# Patient Record
Sex: FEMALE | Race: WHITE | Employment: OTHER | URBAN - NONMETROPOLITAN AREA
[De-identification: names, ages, dates, MRNs, and addresses within clinical notes are randomized per-mention and may not be internally consistent; named-entity substitution may affect disease eponyms.]

---

## 2017-01-09 ENCOUNTER — OFFICE VISIT (OUTPATIENT)
Dept: ENDOCRINOLOGY | Age: 62
End: 2017-01-09

## 2017-01-09 VITALS
SYSTOLIC BLOOD PRESSURE: 137 MMHG | HEART RATE: 85 BPM | HEIGHT: 69 IN | DIASTOLIC BLOOD PRESSURE: 67 MMHG | WEIGHT: 240 LBS | BODY MASS INDEX: 35.55 KG/M2 | RESPIRATION RATE: 18 BRPM

## 2017-01-09 DIAGNOSIS — E89.0 POST-SURGICAL HYPOTHYROIDISM: ICD-10-CM

## 2017-01-09 DIAGNOSIS — E10.65 TYPE 1 DIABETES MELLITUS WITH HYPERGLYCEMIA (HCC): Primary | ICD-10-CM

## 2017-01-09 PROCEDURE — 99214 OFFICE O/P EST MOD 30 MIN: CPT | Performed by: CLINICAL NURSE SPECIALIST

## 2017-01-09 RX ORDER — CALCIUM CARBONATE/VITAMIN D3 600 MG-10
1200 TABLET ORAL DAILY
COMMUNITY

## 2017-01-09 ASSESSMENT — ENCOUNTER SYMPTOMS
WHEEZING: 0
SHORTNESS OF BREATH: 0
NAUSEA: 0
CHEST TIGHTNESS: 0
VOICE CHANGE: 0
ABDOMINAL PAIN: 0
CONSTIPATION: 0
COUGH: 0
DIARRHEA: 0
EYE REDNESS: 0

## 2017-02-16 RX ORDER — PERINDOPRIL ERBUMINE 2 MG/1
TABLET ORAL
Qty: 180 TABLET | Refills: 0 | Status: SHIPPED | OUTPATIENT
Start: 2017-02-16 | End: 2017-05-24 | Stop reason: SDUPTHER

## 2017-03-20 ENCOUNTER — OFFICE VISIT (OUTPATIENT)
Dept: ENDOCRINOLOGY | Age: 62
End: 2017-03-20

## 2017-03-20 VITALS
HEIGHT: 69 IN | HEART RATE: 83 BPM | RESPIRATION RATE: 16 BRPM | DIASTOLIC BLOOD PRESSURE: 62 MMHG | WEIGHT: 237 LBS | SYSTOLIC BLOOD PRESSURE: 122 MMHG | BODY MASS INDEX: 35.1 KG/M2

## 2017-03-20 DIAGNOSIS — E78.2 MIXED HYPERLIPIDEMIA: ICD-10-CM

## 2017-03-20 DIAGNOSIS — E10.65 TYPE 1 DIABETES MELLITUS WITH HYPERGLYCEMIA (HCC): Primary | ICD-10-CM

## 2017-03-20 DIAGNOSIS — E03.9 ACQUIRED HYPOTHYROIDISM: ICD-10-CM

## 2017-03-20 PROCEDURE — 99214 OFFICE O/P EST MOD 30 MIN: CPT | Performed by: INTERNAL MEDICINE

## 2017-03-20 RX ORDER — EZETIMIBE 10 MG/1
10 TABLET ORAL DAILY
Qty: 30 TABLET | Refills: 3 | Status: SHIPPED | OUTPATIENT
Start: 2017-03-20 | End: 2017-10-17

## 2017-03-20 ASSESSMENT — ENCOUNTER SYMPTOMS: BLURRED VISION: 0

## 2017-03-29 ENCOUNTER — TELEPHONE (OUTPATIENT)
Dept: OTHER | Age: 62
End: 2017-03-29

## 2017-05-03 ENCOUNTER — TELEPHONE (OUTPATIENT)
Dept: ENDOCRINOLOGY | Age: 62
End: 2017-05-03

## 2017-05-03 DIAGNOSIS — E03.9 ACQUIRED HYPOTHYROIDISM: ICD-10-CM

## 2017-05-03 RX ORDER — LEVOTHYROXINE SODIUM 137 MCG
TABLET ORAL
Qty: 90 TABLET | Refills: 0 | Status: SHIPPED | OUTPATIENT
Start: 2017-05-03 | End: 2017-08-01 | Stop reason: SDUPTHER

## 2017-05-25 ENCOUNTER — TELEPHONE (OUTPATIENT)
Dept: ENDOCRINOLOGY | Age: 62
End: 2017-05-25

## 2017-05-25 DIAGNOSIS — E10.65 TYPE 1 DIABETES MELLITUS WITH HYPERGLYCEMIA (HCC): ICD-10-CM

## 2017-05-25 DIAGNOSIS — E03.9 ACQUIRED HYPOTHYROIDISM: Primary | ICD-10-CM

## 2017-05-25 RX ORDER — PERINDOPRIL ERBUMINE 2 MG/1
TABLET ORAL
Qty: 180 TABLET | Refills: 0 | Status: SHIPPED | OUTPATIENT
Start: 2017-05-25 | End: 2017-06-05 | Stop reason: SDUPTHER

## 2017-06-05 RX ORDER — PERINDOPRIL ERBUMINE 2 MG/1
TABLET ORAL
Qty: 180 TABLET | Refills: 0 | Status: SHIPPED | OUTPATIENT
Start: 2017-06-05 | End: 2017-09-03 | Stop reason: SDUPTHER

## 2017-07-19 ENCOUNTER — OFFICE VISIT (OUTPATIENT)
Dept: ENDOCRINOLOGY | Age: 62
End: 2017-07-19
Payer: COMMERCIAL

## 2017-07-19 VITALS
HEART RATE: 72 BPM | RESPIRATION RATE: 16 BRPM | BODY MASS INDEX: 34.36 KG/M2 | SYSTOLIC BLOOD PRESSURE: 128 MMHG | WEIGHT: 232 LBS | DIASTOLIC BLOOD PRESSURE: 68 MMHG | HEIGHT: 69 IN

## 2017-07-19 DIAGNOSIS — E03.9 HYPOTHYROIDISM (ACQUIRED): ICD-10-CM

## 2017-07-19 DIAGNOSIS — Z91.199 NONCOMPLIANCE WITH THERAPEUTIC PLAN: ICD-10-CM

## 2017-07-19 DIAGNOSIS — E11.65 TYPE 2 DIABETES MELLITUS WITH HYPERGLYCEMIA, WITH LONG-TERM CURRENT USE OF INSULIN (HCC): Primary | ICD-10-CM

## 2017-07-19 DIAGNOSIS — Z79.4 TYPE 2 DIABETES MELLITUS WITH HYPERGLYCEMIA, WITH LONG-TERM CURRENT USE OF INSULIN (HCC): Primary | ICD-10-CM

## 2017-07-19 DIAGNOSIS — E55.9 HYPOVITAMINOSIS D: ICD-10-CM

## 2017-07-19 PROCEDURE — 99214 OFFICE O/P EST MOD 30 MIN: CPT | Performed by: CLINICAL NURSE SPECIALIST

## 2017-07-19 ASSESSMENT — ENCOUNTER SYMPTOMS
NAUSEA: 0
EYE REDNESS: 0
VOICE CHANGE: 0
COUGH: 0
ABDOMINAL PAIN: 0
WHEEZING: 0
CHEST TIGHTNESS: 0
DIARRHEA: 0
SHORTNESS OF BREATH: 0
CONSTIPATION: 0

## 2017-08-01 DIAGNOSIS — E03.9 ACQUIRED HYPOTHYROIDISM: ICD-10-CM

## 2017-08-01 RX ORDER — LEVOTHYROXINE SODIUM 137 MCG
TABLET ORAL
Qty: 90 TABLET | Refills: 1 | Status: SHIPPED | OUTPATIENT
Start: 2017-08-01 | End: 2017-12-29 | Stop reason: SDUPTHER

## 2017-08-08 ENCOUNTER — TELEPHONE (OUTPATIENT)
Dept: ENDOCRINOLOGY | Age: 62
End: 2017-08-08

## 2017-08-29 ENCOUNTER — HOSPITAL ENCOUNTER (OUTPATIENT)
Age: 62
Discharge: HOME | End: 2017-08-29
Payer: COMMERCIAL

## 2017-08-29 DIAGNOSIS — E55.9 HYPOVITAMINOSIS D: ICD-10-CM

## 2017-08-29 DIAGNOSIS — E03.9 HYPOTHYROIDISM (ACQUIRED): ICD-10-CM

## 2017-08-29 LAB
T4 FREE: 1.41 NG/DL (ref 0.93–1.76)
TSH SERPL DL<=0.05 MIU/L-ACNC: 1.09 UIU/ML (ref 0.4–4.2)
VITAMIN D 25-HYDROXY: 33 NG/ML (ref 30–100)

## 2017-08-29 PROCEDURE — 84443 ASSAY THYROID STIM HORMONE: CPT

## 2017-08-29 PROCEDURE — 36415 COLL VENOUS BLD VENIPUNCTURE: CPT

## 2017-08-29 PROCEDURE — 84439 ASSAY OF FREE THYROXINE: CPT

## 2017-08-29 PROCEDURE — 82306 VITAMIN D 25 HYDROXY: CPT

## 2017-09-05 ENCOUNTER — TELEPHONE (OUTPATIENT)
Dept: ENDOCRINOLOGY | Age: 62
End: 2017-09-05

## 2017-09-05 DIAGNOSIS — Z79.4 TYPE 2 DIABETES MELLITUS WITH HYPERGLYCEMIA, WITH LONG-TERM CURRENT USE OF INSULIN (HCC): ICD-10-CM

## 2017-09-05 DIAGNOSIS — E05.90 HYPERTHYROIDISM: Primary | ICD-10-CM

## 2017-09-05 DIAGNOSIS — E11.65 TYPE 2 DIABETES MELLITUS WITH HYPERGLYCEMIA, WITH LONG-TERM CURRENT USE OF INSULIN (HCC): ICD-10-CM

## 2017-09-06 RX ORDER — PERINDOPRIL ERBUMINE 2 MG/1
TABLET ORAL
Qty: 180 TABLET | Refills: 1 | Status: SHIPPED | OUTPATIENT
Start: 2017-09-06 | End: 2018-03-05 | Stop reason: SDUPTHER

## 2017-10-06 DIAGNOSIS — E10.65 TYPE 1 DIABETES MELLITUS WITH HYPERGLYCEMIA (HCC): ICD-10-CM

## 2017-10-09 NOTE — TELEPHONE ENCOUNTER
Patients mail order company contacted our office requesting a 90 day supply of Lantus. She was seen in the office last July 2017 and is scheduled to return 10-17-17. RX is pending, please advise.

## 2017-10-13 ENCOUNTER — HOSPITAL ENCOUNTER (OUTPATIENT)
Age: 62
Discharge: HOME | End: 2017-10-13
Payer: COMMERCIAL

## 2017-10-13 DIAGNOSIS — Z79.4 TYPE 2 DIABETES MELLITUS WITH HYPERGLYCEMIA, WITH LONG-TERM CURRENT USE OF INSULIN (HCC): ICD-10-CM

## 2017-10-13 DIAGNOSIS — E11.65 TYPE 2 DIABETES MELLITUS WITH HYPERGLYCEMIA, WITH LONG-TERM CURRENT USE OF INSULIN (HCC): ICD-10-CM

## 2017-10-13 DIAGNOSIS — E05.90 HYPERTHYROIDISM: ICD-10-CM

## 2017-10-13 LAB
AVERAGE GLUCOSE: 210 MG/DL (ref 70–126)
HBA1C MFR BLD: 9 % (ref 4.4–6.4)
T4 FREE: 1.35 NG/DL (ref 0.93–1.76)
TSH SERPL DL<=0.05 MIU/L-ACNC: 1.66 UIU/ML (ref 0.4–4.2)

## 2017-10-13 PROCEDURE — 84439 ASSAY OF FREE THYROXINE: CPT

## 2017-10-13 PROCEDURE — 83036 HEMOGLOBIN GLYCOSYLATED A1C: CPT

## 2017-10-13 PROCEDURE — 36415 COLL VENOUS BLD VENIPUNCTURE: CPT

## 2017-10-13 PROCEDURE — 84443 ASSAY THYROID STIM HORMONE: CPT

## 2017-10-17 ENCOUNTER — OFFICE VISIT (OUTPATIENT)
Dept: ENDOCRINOLOGY | Age: 62
End: 2017-10-17
Payer: COMMERCIAL

## 2017-10-17 VITALS
HEIGHT: 69 IN | DIASTOLIC BLOOD PRESSURE: 64 MMHG | HEART RATE: 64 BPM | WEIGHT: 236 LBS | RESPIRATION RATE: 16 BRPM | SYSTOLIC BLOOD PRESSURE: 131 MMHG | BODY MASS INDEX: 34.96 KG/M2

## 2017-10-17 DIAGNOSIS — E10.65 TYPE 1 DIABETES MELLITUS WITH HYPERGLYCEMIA (HCC): ICD-10-CM

## 2017-10-17 DIAGNOSIS — E03.9 HYPOTHYROIDISM (ACQUIRED): Primary | ICD-10-CM

## 2017-10-17 DIAGNOSIS — E78.2 MIXED HYPERLIPIDEMIA: ICD-10-CM

## 2017-10-17 PROCEDURE — 99214 OFFICE O/P EST MOD 30 MIN: CPT | Performed by: CLINICAL NURSE SPECIALIST

## 2017-10-17 ASSESSMENT — ENCOUNTER SYMPTOMS
DIARRHEA: 0
VOICE CHANGE: 0
ABDOMINAL PAIN: 0
WHEEZING: 0
COUGH: 0
SHORTNESS OF BREATH: 0
NAUSEA: 0
EYE REDNESS: 0
CHEST TIGHTNESS: 0
CONSTIPATION: 0

## 2017-10-17 NOTE — PROGRESS NOTES
[facility] PROFESSIONAL SERVS  ENDOCRINE DIABETES METABOLISM CHRISTIANNE  [address]  [address] [place] [address]  Dept: [phone]  Dept Fax: [phone]  Loc: [phone]    Theresa Figueredo is a 63 y.o. female who presents today for follow up evaluation for type 1 diabetes diagnosed . Last visit 2017. Patient is maintained on lantus BID, humalog fixed dose TID. She continues to check BS minimally and is not consistent with insulin administration and diet. Checking 1-3 times daily readings 60's to 300's throughout the day. She does recognize symptoms of lows. A1c 9% this month improved from previous 10% in 2017. She reports inconsistency in meal times, diet, activities. She did not re attend education as requested previously. relates insurance will not cover education -   Denies foot problems, no numbness, tingling, rash or lesion. Eye exam within past year history of glaucoma, mild retinopathy bilaterally, cataracts - following with ophthalmology every 6 months. Patient is allso hypothyroid secondary to thyroidectomy. Status post total thyroidectomy 13 by Dr. 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 Pathology benign.  Maintained on synthroid 137 mcg daily  Thyroid function in goal this month TSH 1.66, FT4 1.35.      Chief Complaint   Patient presents with    Diabetes     Type 2,  at 12pm FASTING (her meter)    Hypothyroidism    Other     81mg aspirin    Other     Last eye exam 2017     Other     No feet issues       HPI:     HPI    Past Medical History:   Diagnosis Date    Depression     Fibromyalgia     Glaucoma     Hyperthyroidism     Meningitis 10-    viral     Meningitis spinal 10/04/2013    Viral    Osteoarthritis     Type I (juvenile type) diabetes mellitus without mention of complication, not stated as uncontrolled       Past Surgical History:   Procedure Laterality Date     SECTION      COLONOSCOPY      EYE SURGERY Left 6-18-14    HEMORRHOID SURGERY      INSERTABLE CARDIAC MONITOR Left 07/13/2017    Dr. Ferris Sicard Left 09/04/2013    Meniscus repair    TONSILLECTOMY      TOTAL THYROIDECTOMY  12/27/2013     Family History   Problem Relation Age of Onset    Thyroid Disease Mother     High Cholesterol Mother     Diabetes Father      Social History   Substance Use Topics    Smoking status: Never Smoker    Smokeless tobacco: Never Used    Alcohol use No      Comment: rarely      Current Outpatient Prescriptions   Medication Sig Dispense Refill    insulin glargine (LANTUS SOLOSTAR) 100 UNIT/ML injection pen Inject 15 Units into the skin 2 times daily 10 Pen 1    perindopril (ACEON) 2 MG tablet TAKE 2 TABLETS DAILY 180 tablet 1    insulin lispro (HUMALOG KWIKPEN) 100 UNIT/ML pen Inject 8 units TID before meals plus sliding scale. (Max dose 48U/day) 15 Pen 1    SYNTHROID 137 MCG tablet Take one tablet daily with an extra half tablet one day per week 90 tablet 1    Omega 3 1200 MG CAPS Take 1,200 capsules by mouth daily      Blood Glucose Monitoring Suppl (ADVOCATE BLOOD GLUCOSE MONITOR) JUAN Check blood sugar 4 x daily (Dx:E10.65) 1 Device 0    glucose blood VI test strips (ADVOCATE TEST) strip Check blood sugar 4 x daily (Dx: E10.65) 150 each 5    Advocate Lancets MISC Check blood sugar 4 x daily (Dx: E10.65) 150 each 5    brimonidine-timolol (COMBIGAN) 0.2-0.5 % ophthalmic solution Place 1 drop into both eyes 3 times daily       metoprolol (LOPRESSOR) 25 MG tablet Take 25 mg by mouth 2 times daily      calcium carbonate (TUMS CALCIUM FOR LIFE BONE) 750 MG chewable tablet Take 1 tablet by mouth daily.  aspirin 81 MG tablet Take 81 mg by mouth daily.  PARoxetine (PAXIL) 10 MG tablet Take 20 mg by mouth every morning       therapeutic multivitamin-minerals (THERAGRAN-M) tablet Take 1 tablet by mouth daily.  Ascorbic Acid (VITAMIN C) 500 MG tablet Take 1,000 mg by mouth daily.  vitamin E 1000 UNITS capsule Take 1,000 Units by mouth daily.  Chromium Picolinate 1000 MCG TABS Take 2,000 mcg by mouth daily. No current facility-administered medications for this visit. Allergies   Allergen Reactions    Milk-Related Compounds Other (See Comments)     Severe constipation    Pcn [Penicillins] Rash       Subjective:      Review of Systems   Constitutional: Negative for appetite change, chills, fatigue and unexpected weight change. HENT: Positive for congestion. Negative for hearing loss, tinnitus and voice change. Eyes: Negative for redness and visual disturbance. Respiratory: Negative for cough, chest tightness, shortness of breath and wheezing. Cardiovascular: Negative for chest pain, palpitations and leg swelling. Gastrointestinal: Negative for abdominal pain, constipation, diarrhea and nausea. Endocrine: Negative. Genitourinary: Negative for difficulty urinating, dysuria, frequency and hematuria. Musculoskeletal: Negative for gait problem, myalgias and neck pain. Skin: Negative for rash. Neurological: Negative for dizziness, tremors, facial asymmetry, weakness, numbness and headaches. Hematological: Negative for adenopathy. Psychiatric/Behavioral: Negative for agitation, confusion, sleep disturbance and suicidal ideas. The patient is not nervous/anxious and is not hyperactive. Objective:     Physical Exam   Constitutional: She is oriented to person, place, and time. She appears well-developed and well-nourished. No distress. HENT:   Head: Normocephalic and atraumatic. Right Ear: External ear normal.   Left Ear: External ear normal.   Nose: Nose normal.   Eyes: Conjunctivae and EOM are normal. Pupils are equal, round, and reactive to light. Right eye exhibits no discharge. Left eye exhibits no discharge. No scleral icterus. Neck: Normal range of motion. Neck supple. No thyromegaly present.    Cardiovascular: Normal rate, regular rhythm, intolerance to statin and stopped - 3/2017 chol 149, trig 183, HDL 45, LDL 67, liver panel unremarkable - reports she stopped statin as discussed with previous cardiologist - currently with Dr. Teresita Pappas - she is taking OTC fish oil     Greater than 50% of visit discussing glycemic management, diagnoses as above, reviewing/ordering labs, changing/addressing medication, answering questions      Plan:      Check blood sugar 4 times daily before meals and bedtime  Blood sugar goal 100 to 140  Call in blood sugar readings and dose of insulin taken in 2 weeks; sooner for low/concerns      Lantus 15 units in AM and 15 units in PM  Humalog 8 units three times daily with meals plus sliding scale  Insulin Correction Sliding Scale      Glucose Level:    mg/dl = No extra Insulin   151-200 mg/dl = 1 Units   201-250 mg/dl = 2 Units   251-300 mg/dl = 3 units  301-350 mg/dl = 4 units   351-400 mg/dl = 5 units   Above 400 mg/dl = 6 units and call MD      5 MINUTES BEFORE BREAKFAST, LUNCH, DINNER  Meal time insulin starts to work in about 5 minutes and lasts about 4 to 5 hours in your system. If lows occur during that time, too much insulin was taken for amount of food eaten and/or you were very active      synthroid 137 mcg daily and take extra 1/2 tablet one day per week. Take in AM with water at least one half hour before food or other medication. Continue to exercise, be active as much as possible daily  Continue with portion control, healthy choices, lots of vegetables and whole grains, lean meat, fish, chicken, turkey    Return in about 3 months (around 1/17/2018).        Electronically signed by GONSALO Ellsworth on 10/17/2017 at 4:22 PM

## 2017-12-29 DIAGNOSIS — E03.9 ACQUIRED HYPOTHYROIDISM: ICD-10-CM

## 2018-01-02 RX ORDER — LEVOTHYROXINE SODIUM 137 MCG
TABLET ORAL
Qty: 90 TABLET | Refills: 1 | Status: SHIPPED | OUTPATIENT
Start: 2018-01-02 | End: 2018-06-21 | Stop reason: SDUPTHER

## 2018-02-15 RX ORDER — INSULIN LISPRO 100 [IU]/ML
INJECTION, SOLUTION INTRAVENOUS; SUBCUTANEOUS
Qty: 15 PEN | Refills: 1 | Status: SHIPPED | OUTPATIENT
Start: 2018-02-15 | End: 2018-09-20 | Stop reason: SDUPTHER

## 2018-02-22 ENCOUNTER — HOSPITAL ENCOUNTER (OUTPATIENT)
Age: 63
Discharge: HOME | End: 2018-02-22
Payer: COMMERCIAL

## 2018-02-22 DIAGNOSIS — E78.2 MIXED HYPERLIPIDEMIA: ICD-10-CM

## 2018-02-22 DIAGNOSIS — E03.9 HYPOTHYROIDISM (ACQUIRED): ICD-10-CM

## 2018-02-22 DIAGNOSIS — E10.65 TYPE 1 DIABETES MELLITUS WITH HYPERGLYCEMIA (HCC): ICD-10-CM

## 2018-02-22 LAB
ALBUMIN SERPL-MCNC: 4.4 G/DL (ref 3.5–5.1)
ALP BLD-CCNC: 102 U/L (ref 38–126)
ALT SERPL-CCNC: 24 U/L (ref 11–66)
ANION GAP SERPL CALCULATED.3IONS-SCNC: 13 MEQ/L (ref 8–16)
AST SERPL-CCNC: 24 U/L (ref 5–40)
AVERAGE GLUCOSE: 219 MG/DL (ref 70–126)
BILIRUB SERPL-MCNC: 0.4 MG/DL (ref 0.3–1.2)
BUN BLDV-MCNC: 15 MG/DL (ref 7–22)
CALCIUM SERPL-MCNC: 10.1 MG/DL (ref 8.5–10.5)
CHLORIDE BLD-SCNC: 97 MEQ/L (ref 98–111)
CHOLESTEROL, TOTAL: 254 MG/DL (ref 100–199)
CO2: 29 MEQ/L (ref 23–33)
CREAT SERPL-MCNC: 0.9 MG/DL (ref 0.4–1.2)
GFR SERPL CREATININE-BSD FRML MDRD: 64 ML/MIN/1.73M2
GLUCOSE BLD-MCNC: 240 MG/DL (ref 70–108)
HBA1C MFR BLD: 9.3 % (ref 4.4–6.4)
HDLC SERPL-MCNC: 71 MG/DL
LDL CHOLESTEROL CALCULATED: 148 MG/DL
POTASSIUM SERPL-SCNC: 5 MEQ/L (ref 3.5–5.2)
SODIUM BLD-SCNC: 139 MEQ/L (ref 135–145)
T4 FREE: 1.5 NG/DL (ref 0.93–1.76)
TOTAL PROTEIN: 7.7 G/DL (ref 6.1–8)
TRIGL SERPL-MCNC: 173 MG/DL (ref 0–199)
TSH SERPL DL<=0.05 MIU/L-ACNC: 2.05 UIU/ML (ref 0.4–4.2)

## 2018-02-22 PROCEDURE — 80053 COMPREHEN METABOLIC PANEL: CPT

## 2018-02-22 PROCEDURE — 84443 ASSAY THYROID STIM HORMONE: CPT

## 2018-02-22 PROCEDURE — 36415 COLL VENOUS BLD VENIPUNCTURE: CPT

## 2018-02-22 PROCEDURE — 80061 LIPID PANEL: CPT

## 2018-02-22 PROCEDURE — 83036 HEMOGLOBIN GLYCOSYLATED A1C: CPT

## 2018-02-22 PROCEDURE — 84439 ASSAY OF FREE THYROXINE: CPT

## 2018-02-23 ENCOUNTER — TELEPHONE (OUTPATIENT)
Dept: ENDOCRINOLOGY | Age: 63
End: 2018-02-23

## 2018-02-23 NOTE — TELEPHONE ENCOUNTER
----- Message from GONSALO Hoyos sent at 2/22/2018 10:23 AM EST -----  Elevated A1c at 9.3%, high cholesterol; keep upcoming appointment

## 2018-03-01 ENCOUNTER — OFFICE VISIT (OUTPATIENT)
Dept: ENDOCRINOLOGY | Age: 63
End: 2018-03-01
Payer: COMMERCIAL

## 2018-03-01 VITALS
HEART RATE: 81 BPM | DIASTOLIC BLOOD PRESSURE: 72 MMHG | SYSTOLIC BLOOD PRESSURE: 124 MMHG | BODY MASS INDEX: 34.88 KG/M2 | HEIGHT: 69 IN | RESPIRATION RATE: 16 BRPM | WEIGHT: 235.5 LBS

## 2018-03-01 DIAGNOSIS — E03.9 ACQUIRED HYPOTHYROIDISM: Primary | ICD-10-CM

## 2018-03-01 DIAGNOSIS — E10.9 TYPE 1 DIABETES MELLITUS WITHOUT COMPLICATION (HCC): ICD-10-CM

## 2018-03-01 DIAGNOSIS — I10 ESSENTIAL HYPERTENSION: ICD-10-CM

## 2018-03-01 DIAGNOSIS — E78.2 MIXED HYPERLIPIDEMIA: ICD-10-CM

## 2018-03-01 PROCEDURE — 99214 OFFICE O/P EST MOD 30 MIN: CPT | Performed by: INTERNAL MEDICINE

## 2018-03-01 RX ORDER — MIDODRINE HYDROCHLORIDE 5 MG/1
5 TABLET ORAL 3 TIMES DAILY
COMMUNITY
End: 2020-07-13 | Stop reason: ALTCHOICE

## 2018-03-01 ASSESSMENT — ENCOUNTER SYMPTOMS
CONSTIPATION: 0
NAUSEA: 0
SHORTNESS OF BREATH: 0
ABDOMINAL DISTENTION: 0
DIARRHEA: 0
CHEST TIGHTNESS: 0
WHEEZING: 0

## 2018-03-01 NOTE — PROGRESS NOTES
Value Date     02/22/2018    K 5.0 02/22/2018    CL 97 02/22/2018    CO2 29 02/22/2018    BUN 15 02/22/2018    CREATININE 0.9 02/22/2018    GLUCOSE 240 02/22/2018    GLUCOSE 230 10/26/2015    CALCIUM 10.1 02/22/2018          2)    Patient is allso hypothyroid secondary to thyroidectomy. Status post total thyroidectomy 12/27/13 by Dr. Mansoor Guadarrama for toxic nodular goiter previously treated with tapazole.  Pathology benign.  Maintained on synthroid 137 mcg daily    Lab Results   Component Value Date    TSH 2.050 02/22/2018         Medications:    Current Outpatient Prescriptions:     midodrine (PROAMATINE) 5 MG tablet, Take 5 mg by mouth 2 times daily, Disp: , Rfl:     glucagon 1 MG injection, Inject 1 mg into the skin See Admin Instructions Follow package directions for low blood sugar., Disp: 1 kit, Rfl: 3    acetone, urine, test strip, Please test urine for ketones if you have nausea, vomiting and abdominal pain., Disp: 10 strip, Rfl: 1    HUMALOG KWIKPEN 100 UNIT/ML pen, INJECT 8 UNITS THREE TIMES A DAY BEFORE MEALS PLUS SLIDING SCALE. (MAX DOSE 48 UNITS/DAY), Disp: 15 pen, Rfl: 1    SYNTHROID 137 MCG tablet, TAKE 1 TABLET DAILY WITH AN EXTRA HALF TABLET ONE DAY PER WEEK, Disp: 90 tablet, Rfl: 1    insulin glargine (LANTUS SOLOSTAR) 100 UNIT/ML injection pen, Inject 15 Units into the skin 2 times daily, Disp: 10 Pen, Rfl: 1    perindopril (ACEON) 2 MG tablet, TAKE 2 TABLETS DAILY, Disp: 180 tablet, Rfl: 1    Omega 3 1200 MG CAPS, Take 1,200 capsules by mouth daily, Disp: , Rfl:     Blood Glucose Monitoring Suppl (ADVOCATE BLOOD GLUCOSE MONITOR) JUAN, Check blood sugar 4 x daily (Dx:E10.65), Disp: 1 Device, Rfl: 0    glucose blood VI test strips (ADVOCATE TEST) strip, Check blood sugar 4 x daily (Dx: E10.65), Disp: 150 each, Rfl: 5    Advocate Lancets MISC, Check blood sugar 4 x daily (Dx: E10.65), Disp: 150 each, Rfl: 5    brimonidine-timolol (COMBIGAN) 0.2-0.5 % ophthalmic solution, Place 1 drop into both eyes 3 times daily , Disp: , Rfl:     metoprolol (LOPRESSOR) 25 MG tablet, Take 25 mg by mouth 2 times daily, Disp: , Rfl:     calcium carbonate (TUMS CALCIUM FOR LIFE BONE) 750 MG chewable tablet, Take 1 tablet by mouth daily. , Disp: , Rfl:     aspirin 81 MG tablet, Take 81 mg by mouth daily. , Disp: , Rfl:     PARoxetine (PAXIL) 10 MG tablet, Take 20 mg by mouth every morning , Disp: , Rfl:     therapeutic multivitamin-minerals (THERAGRAN-M) tablet, Take 1 tablet by mouth daily. , Disp: , Rfl:     Ascorbic Acid (VITAMIN C) 500 MG tablet, Take 1,000 mg by mouth daily. , Disp: , Rfl:     vitamin E 1000 UNITS capsule, Take 1,000 Units by mouth daily. , Disp: , Rfl:     Chromium Picolinate 1000 MCG TABS, Take 2,000 mcg by mouth daily. , Disp: , Rfl:     Allergies: The patient is allergic to milk-related compounds and pcn [penicillins]. Past Medical History:  Rashawn Hall  has a past medical history of Depression; Fibromyalgia; Glaucoma; Hyperthyroidism; Meningitis; Meningitis spinal; Osteoarthritis; and Type I (juvenile type) diabetes mellitus without mention of complication, not stated as uncontrolled. Past Surgical History:  The patient  has a past surgical history that includes  section; Tonsillectomy; Hemorrhoid surgery; knee surgery (Left, 2013); Colonoscopy; Total Thyroidectomy (2013); Eye surgery (Left, 14); and Insertable Cardiac Monitor (Left, 2017). Family History: This patient's family history includes Diabetes in her father; High Cholesterol in her mother; Thyroid Disease in her mother. Social History:  Rashawn Hall  reports that she has never smoked. She has never used smokeless tobacco. She reports that she does not drink alcohol or use drugs. Review of Systems:   Review of Systems   Constitutional: Negative for activity change, appetite change, fatigue and fever. Respiratory: Negative for chest tightness, shortness of breath and wheezing.     Cardiovascular:

## 2018-03-01 NOTE — PATIENT INSTRUCTIONS
Lantus 15 units in AM and 12 units in PM  Humalog 8 units with breakfast and lunch and 11 units with dinner  plus sliding scale  Insulin Correction Sliding Scale      Glucose Level:    mg/dl = No extra Insulin   151-200 mg/dl = 1 Units   201-250 mg/dl = 2 Units   251-300 mg/dl = 3 units  301-350 mg/dl = 4 units   351-400 mg/dl = 5 units   Above 400 mg/dl = 6 units and call MD

## 2018-03-05 RX ORDER — PERINDOPRIL ERBUMINE 2 MG/1
TABLET ORAL
Qty: 180 TABLET | Refills: 1 | Status: SHIPPED | OUTPATIENT
Start: 2018-03-05 | End: 2018-09-06 | Stop reason: SDUPTHER

## 2018-03-07 ENCOUNTER — TELEPHONE (OUTPATIENT)
Dept: ENDOCRINOLOGY | Age: 63
End: 2018-03-07

## 2018-03-08 ENCOUNTER — TELEPHONE (OUTPATIENT)
Dept: ENDOCRINOLOGY | Age: 63
End: 2018-03-08

## 2018-03-15 ENCOUNTER — TELEPHONE (OUTPATIENT)
Dept: ENDOCRINOLOGY | Age: 63
End: 2018-03-15

## 2018-05-01 DIAGNOSIS — E10.65 TYPE 1 DIABETES MELLITUS WITH HYPERGLYCEMIA (HCC): ICD-10-CM

## 2018-05-02 RX ORDER — INSULIN GLARGINE 100 [IU]/ML
INJECTION, SOLUTION SUBCUTANEOUS
Qty: 30 ML | Refills: 1 | Status: SHIPPED | OUTPATIENT
Start: 2018-05-02 | End: 2019-01-21 | Stop reason: SDUPTHER

## 2018-06-14 ENCOUNTER — HOSPITAL ENCOUNTER (OUTPATIENT)
Age: 63
Discharge: HOME | End: 2018-06-14
Payer: COMMERCIAL

## 2018-06-14 LAB
ALT SERPL-CCNC: 26 U/L (ref 11–66)
ANION GAP SERPL CALCULATED.3IONS-SCNC: 14 MEQ/L (ref 8–16)
AST SERPL-CCNC: 26 U/L (ref 5–40)
AVERAGE GLUCOSE: 249 MG/DL (ref 70–126)
BUN BLDV-MCNC: 15 MG/DL (ref 7–22)
CALCIUM SERPL-MCNC: 9.7 MG/DL (ref 8.5–10.5)
CHLORIDE BLD-SCNC: 98 MEQ/L (ref 98–111)
CHOLESTEROL, TOTAL: 267 MG/DL (ref 100–199)
CO2: 26 MEQ/L (ref 23–33)
CREAT SERPL-MCNC: 0.8 MG/DL (ref 0.4–1.2)
CREATININE, URINE: 92.2 MG/DL
GFR SERPL CREATININE-BSD FRML MDRD: 73 ML/MIN/1.73M2
GLUCOSE BLD-MCNC: 287 MG/DL (ref 70–108)
HBA1C MFR BLD: 10.3 % (ref 4.4–6.4)
HDLC SERPL-MCNC: 63 MG/DL
LDL CHOLESTEROL CALCULATED: 155 MG/DL
MICROALBUMIN UR-MCNC: < 1.2 MG/DL
MICROALBUMIN/CREAT UR-RTO: 13 MG/G (ref 0–30)
POTASSIUM SERPL-SCNC: 5.2 MEQ/L (ref 3.5–5.2)
SODIUM BLD-SCNC: 138 MEQ/L (ref 135–145)
T4 FREE: 1.32 NG/DL (ref 0.93–1.76)
TRIGL SERPL-MCNC: 244 MG/DL (ref 0–199)
TSH SERPL DL<=0.05 MIU/L-ACNC: 1.65 UIU/ML (ref 0.4–4.2)

## 2018-06-14 PROCEDURE — 84460 ALANINE AMINO (ALT) (SGPT): CPT

## 2018-06-14 PROCEDURE — 80061 LIPID PANEL: CPT

## 2018-06-14 PROCEDURE — 83036 HEMOGLOBIN GLYCOSYLATED A1C: CPT

## 2018-06-14 PROCEDURE — 36415 COLL VENOUS BLD VENIPUNCTURE: CPT

## 2018-06-14 PROCEDURE — 84450 TRANSFERASE (AST) (SGOT): CPT

## 2018-06-14 PROCEDURE — 82043 UR ALBUMIN QUANTITATIVE: CPT

## 2018-06-14 PROCEDURE — 84439 ASSAY OF FREE THYROXINE: CPT

## 2018-06-14 PROCEDURE — 84443 ASSAY THYROID STIM HORMONE: CPT

## 2018-06-14 PROCEDURE — 80048 BASIC METABOLIC PNL TOTAL CA: CPT

## 2018-06-21 DIAGNOSIS — E03.9 ACQUIRED HYPOTHYROIDISM: ICD-10-CM

## 2018-06-25 RX ORDER — LEVOTHYROXINE SODIUM 137 MCG
TABLET ORAL
Qty: 96 TABLET | Refills: 0 | Status: SHIPPED | OUTPATIENT
Start: 2018-06-25 | End: 2018-09-23 | Stop reason: SDUPTHER

## 2018-09-06 ENCOUNTER — OFFICE VISIT (OUTPATIENT)
Dept: INTERNAL MEDICINE CLINIC | Age: 63
End: 2018-09-06
Payer: COMMERCIAL

## 2018-09-06 VITALS
SYSTOLIC BLOOD PRESSURE: 128 MMHG | WEIGHT: 230 LBS | DIASTOLIC BLOOD PRESSURE: 86 MMHG | BODY MASS INDEX: 34.07 KG/M2 | HEART RATE: 78 BPM | RESPIRATION RATE: 14 BRPM

## 2018-09-06 DIAGNOSIS — E78.2 MIXED HYPERLIPIDEMIA: ICD-10-CM

## 2018-09-06 DIAGNOSIS — I10 ESSENTIAL HYPERTENSION: ICD-10-CM

## 2018-09-06 DIAGNOSIS — E03.9 ACQUIRED HYPOTHYROIDISM: ICD-10-CM

## 2018-09-06 DIAGNOSIS — E04.2 NONTOXIC MULTINODULAR GOITER: ICD-10-CM

## 2018-09-06 DIAGNOSIS — E10.9 TYPE 1 DIABETES MELLITUS NOT AT GOAL (HCC): Primary | ICD-10-CM

## 2018-09-06 DIAGNOSIS — E66.01 MORBID OBESITY (HCC): ICD-10-CM

## 2018-09-06 DIAGNOSIS — H35.00 RETINOPATHY: ICD-10-CM

## 2018-09-06 LAB — HBA1C MFR BLD: 9.8 % (ref 4.3–5.7)

## 2018-09-06 PROCEDURE — 36416 COLLJ CAPILLARY BLOOD SPEC: CPT | Performed by: INTERNAL MEDICINE

## 2018-09-06 PROCEDURE — 83036 HEMOGLOBIN GLYCOSYLATED A1C: CPT | Performed by: INTERNAL MEDICINE

## 2018-09-06 PROCEDURE — 99204 OFFICE O/P NEW MOD 45 MIN: CPT | Performed by: INTERNAL MEDICINE

## 2018-09-06 RX ORDER — PERINDOPRIL ERBUMINE 2 MG/1
TABLET ORAL
Qty: 180 TABLET | Refills: 1 | Status: SHIPPED | OUTPATIENT
Start: 2018-09-06 | End: 2019-01-21 | Stop reason: SDUPTHER

## 2018-09-06 ASSESSMENT — PATIENT HEALTH QUESTIONNAIRE - PHQ9
1. LITTLE INTEREST OR PLEASURE IN DOING THINGS: 0
2. FEELING DOWN, DEPRESSED OR HOPELESS: 0
SUM OF ALL RESPONSES TO PHQ9 QUESTIONS 1 & 2: 0
SUM OF ALL RESPONSES TO PHQ QUESTIONS 1-9: 0
SUM OF ALL RESPONSES TO PHQ QUESTIONS 1-9: 0

## 2018-09-17 ENCOUNTER — TELEPHONE (OUTPATIENT)
Dept: INTERNAL MEDICINE CLINIC | Age: 63
End: 2018-09-17

## 2018-09-17 RX ORDER — FLASH GLUCOSE SENSOR
KIT MISCELLANEOUS
Qty: 3 EACH | Refills: 3 | Status: CANCELLED | OUTPATIENT
Start: 2018-09-17 | End: 2018-09-17

## 2018-09-17 RX ORDER — FLASH GLUCOSE SENSOR
KIT MISCELLANEOUS
Qty: 1 DEVICE | Refills: 0 | Status: CANCELLED | OUTPATIENT
Start: 2018-09-17 | End: 2018-09-17

## 2018-09-18 DIAGNOSIS — E10.9 TYPE 1 DIABETES MELLITUS WITHOUT COMPLICATION (HCC): Primary | ICD-10-CM

## 2018-09-22 RX ORDER — FLASH GLUCOSE SENSOR
KIT MISCELLANEOUS
Qty: 3 EACH | Refills: 5 | Status: SHIPPED | OUTPATIENT
Start: 2018-09-22 | End: 2019-04-18

## 2018-09-22 RX ORDER — FLASH GLUCOSE SENSOR
KIT MISCELLANEOUS
Qty: 1 DEVICE | Refills: 0 | Status: SHIPPED | OUTPATIENT
Start: 2018-09-22 | End: 2019-04-18

## 2018-09-23 DIAGNOSIS — E03.9 ACQUIRED HYPOTHYROIDISM: ICD-10-CM

## 2018-09-27 RX ORDER — LEVOTHYROXINE SODIUM 137 MCG
TABLET ORAL
Qty: 96 TABLET | Refills: 1 | Status: SHIPPED | OUTPATIENT
Start: 2018-09-27 | End: 2019-01-21 | Stop reason: SDUPTHER

## 2018-10-10 ENCOUNTER — TELEPHONE (OUTPATIENT)
Dept: INTERNAL MEDICINE CLINIC | Age: 63
End: 2018-10-10

## 2018-10-18 ENCOUNTER — OFFICE VISIT (OUTPATIENT)
Dept: INTERNAL MEDICINE CLINIC | Age: 63
End: 2018-10-18
Payer: COMMERCIAL

## 2018-10-18 VITALS
WEIGHT: 232 LBS | DIASTOLIC BLOOD PRESSURE: 72 MMHG | SYSTOLIC BLOOD PRESSURE: 110 MMHG | HEIGHT: 69 IN | HEART RATE: 68 BPM | BODY MASS INDEX: 34.36 KG/M2

## 2018-10-18 DIAGNOSIS — E03.9 HYPOTHYROIDISM, UNSPECIFIED TYPE: Primary | ICD-10-CM

## 2018-10-18 DIAGNOSIS — E04.2 NONTOXIC MULTINODULAR GOITER: ICD-10-CM

## 2018-10-18 DIAGNOSIS — E04.2 MULTINODULAR GOITER: ICD-10-CM

## 2018-10-18 DIAGNOSIS — I10 ESSENTIAL HYPERTENSION: ICD-10-CM

## 2018-10-18 DIAGNOSIS — E78.5 HYPERLIPIDEMIA, UNSPECIFIED HYPERLIPIDEMIA TYPE: ICD-10-CM

## 2018-10-18 PROBLEM — N28.9 ACUTE RENAL INSUFFICIENCY: Status: ACTIVE | Noted: 2018-10-18

## 2018-10-18 PROCEDURE — 99213 OFFICE O/P EST LOW 20 MIN: CPT | Performed by: INTERNAL MEDICINE

## 2018-10-18 NOTE — PROGRESS NOTES
MONITOR) JUAN Check blood sugar 4 x daily (Dx:E10.65) 1 Device 0    glucose blood VI test strips (ADVOCATE TEST) strip Check blood sugar 4 x daily (Dx: E10.65) 150 each 5    Advocate Lancets MISC Check blood sugar 4 x daily (Dx: E10.65) 150 each 5    brimonidine-timolol (COMBIGAN) 0.2-0.5 % ophthalmic solution Place 1 drop into both eyes 3 times daily       metoprolol (LOPRESSOR) 25 MG tablet Take 25 mg by mouth 2 times daily      calcium carbonate (TUMS CALCIUM FOR LIFE BONE) 750 MG chewable tablet Take 1 tablet by mouth daily.  aspirin 81 MG tablet Take 81 mg by mouth daily.  PARoxetine (PAXIL) 10 MG tablet Take 20 mg by mouth every morning       therapeutic multivitamin-minerals (THERAGRAN-M) tablet Take 1 tablet by mouth daily.  Ascorbic Acid (VITAMIN C) 500 MG tablet Take 1,000 mg by mouth daily.  vitamin E 1000 UNITS capsule Take 1,000 Units by mouth daily.  Chromium Picolinate 1000 MCG TABS Take 2,000 mcg by mouth daily. No current facility-administered medications for this visit. Review of Systems Hair loss, fatigue    -Blood pressure 110/72, pulse 68, height 5' 8.9\" (1.75 m), weight 232 lb (105.2 kg), last menstrual period 01/21/2013. Physical Examination: Not repeated     Diagnosis Orders   1. Hypothyroidism, unspecified type  TSH With Reflex Ft4   2. Hyperlipidemia, unspecified hyperlipidemia type     3. Essential hypertension     4. Nontoxic multinodular goiter     5. Multinodular goiter         Orders Placed This Encounter   Procedures    TSH With Reflex Ft4     Standing Status:   Future     Standing Expiration Date:   10/18/2019        Patient says her thyroid is low based on her symptoms. She says I should really treat her based on what some paper. I told her symptoms are nonspecific. TSH is the most sensitive indicator of hypothyroidism  In June hers was, normal.  I will repeat this  Extensive counseling was done regarding diabetes.  The goals are to

## 2018-10-23 ENCOUNTER — HOSPITAL ENCOUNTER (OUTPATIENT)
Age: 63
Discharge: HOME | End: 2018-10-23
Payer: COMMERCIAL

## 2018-10-23 DIAGNOSIS — E03.9 HYPOTHYROIDISM, UNSPECIFIED TYPE: ICD-10-CM

## 2018-10-23 LAB — TSH SERPL DL<=0.05 MIU/L-ACNC: 1.39 UIU/ML (ref 0.4–4.2)

## 2018-10-23 PROCEDURE — 36415 COLL VENOUS BLD VENIPUNCTURE: CPT

## 2018-10-23 PROCEDURE — 84443 ASSAY THYROID STIM HORMONE: CPT

## 2018-10-25 ENCOUNTER — HOSPITAL ENCOUNTER (OUTPATIENT)
Dept: ULTRASOUND IMAGING | Age: 63
Discharge: HOME | End: 2018-10-25
Payer: COMMERCIAL

## 2018-10-25 DIAGNOSIS — E04.2 MULTINODULAR GOITER: ICD-10-CM

## 2018-10-25 PROCEDURE — 76536 US EXAM OF HEAD AND NECK: CPT

## 2018-12-10 ENCOUNTER — TELEPHONE (OUTPATIENT)
Dept: INTERNAL MEDICINE CLINIC | Age: 63
End: 2018-12-10

## 2019-01-12 ENCOUNTER — HOSPITAL ENCOUNTER (OUTPATIENT)
Age: 64
Discharge: HOME | End: 2019-01-12
Payer: COMMERCIAL

## 2019-01-12 LAB
ALBUMIN SERPL-MCNC: 4.3 G/DL (ref 3.5–5.1)
ALP BLD-CCNC: 110 U/L (ref 38–126)
ALT SERPL-CCNC: 18 U/L (ref 11–66)
ANION GAP SERPL CALCULATED.3IONS-SCNC: 11 MEQ/L (ref 8–16)
AST SERPL-CCNC: 20 U/L (ref 5–40)
BILIRUB SERPL-MCNC: 0.5 MG/DL (ref 0.3–1.2)
BUN BLDV-MCNC: 18 MG/DL (ref 7–22)
CALCIUM SERPL-MCNC: 9.5 MG/DL (ref 8.5–10.5)
CHLORIDE BLD-SCNC: 96 MEQ/L (ref 98–111)
CHOLESTEROL, TOTAL: 236 MG/DL (ref 100–199)
CO2: 25 MEQ/L (ref 23–33)
CREAT SERPL-MCNC: 0.9 MG/DL (ref 0.4–1.2)
GFR SERPL CREATININE-BSD FRML MDRD: 63 ML/MIN/1.73M2
GLUCOSE BLD-MCNC: 319 MG/DL (ref 70–108)
HDLC SERPL-MCNC: 64 MG/DL
LDL CHOLESTEROL CALCULATED: 145 MG/DL
MAGNESIUM: 1.9 MG/DL (ref 1.6–2.4)
POTASSIUM SERPL-SCNC: 5 MEQ/L (ref 3.5–5.2)
SODIUM BLD-SCNC: 132 MEQ/L (ref 135–145)
TOTAL PROTEIN: 7.8 G/DL (ref 6.1–8)
TRIGL SERPL-MCNC: 133 MG/DL (ref 0–199)

## 2019-01-12 PROCEDURE — 80061 LIPID PANEL: CPT

## 2019-01-12 PROCEDURE — 80053 COMPREHEN METABOLIC PANEL: CPT

## 2019-01-12 PROCEDURE — 83735 ASSAY OF MAGNESIUM: CPT

## 2019-01-12 PROCEDURE — 36415 COLL VENOUS BLD VENIPUNCTURE: CPT

## 2019-01-21 ENCOUNTER — OFFICE VISIT (OUTPATIENT)
Dept: INTERNAL MEDICINE CLINIC | Age: 64
End: 2019-01-21
Payer: COMMERCIAL

## 2019-01-21 VITALS
HEART RATE: 72 BPM | DIASTOLIC BLOOD PRESSURE: 74 MMHG | WEIGHT: 232.6 LBS | SYSTOLIC BLOOD PRESSURE: 126 MMHG | BODY MASS INDEX: 34.45 KG/M2 | HEIGHT: 69 IN

## 2019-01-21 DIAGNOSIS — R59.0 LYMPHADENOPATHY, CERVICAL: ICD-10-CM

## 2019-01-21 DIAGNOSIS — E04.2 NONTOXIC MULTINODULAR GOITER: ICD-10-CM

## 2019-01-21 DIAGNOSIS — E03.9 ACQUIRED HYPOTHYROIDISM: ICD-10-CM

## 2019-01-21 DIAGNOSIS — E10.9 TYPE 1 DIABETES MELLITUS WITHOUT COMPLICATION (HCC): Primary | ICD-10-CM

## 2019-01-21 DIAGNOSIS — E10.65 TYPE 1 DIABETES MELLITUS WITH HYPERGLYCEMIA (HCC): ICD-10-CM

## 2019-01-21 PROCEDURE — 99214 OFFICE O/P EST MOD 30 MIN: CPT | Performed by: NURSE PRACTITIONER

## 2019-01-21 RX ORDER — PAROXETINE 10 MG/1
10 TABLET, FILM COATED ORAL EVERY MORNING
COMMUNITY

## 2019-01-21 RX ORDER — PERINDOPRIL ERBUMINE 2 MG/1
TABLET ORAL
Qty: 180 TABLET | Refills: 1 | Status: SHIPPED | OUTPATIENT
Start: 2019-01-21 | End: 2019-07-17 | Stop reason: ALTCHOICE

## 2019-01-21 RX ORDER — VALERIAN ROOT 500 MG
400 CAPSULE ORAL NIGHTLY
COMMUNITY
End: 2020-07-13 | Stop reason: SDUPTHER

## 2019-01-21 RX ORDER — LEVOTHYROXINE SODIUM 137 MCG
TABLET ORAL
Qty: 96 TABLET | Refills: 1 | Status: SHIPPED | OUTPATIENT
Start: 2019-01-21 | End: 2019-03-07 | Stop reason: SDUPTHER

## 2019-01-22 ENCOUNTER — TELEPHONE (OUTPATIENT)
Dept: INTERNAL MEDICINE CLINIC | Age: 64
End: 2019-01-22

## 2019-01-22 ENCOUNTER — HOSPITAL ENCOUNTER (OUTPATIENT)
Dept: ULTRASOUND IMAGING | Age: 64
Discharge: HOME | End: 2019-01-22
Payer: COMMERCIAL

## 2019-01-22 ENCOUNTER — HOSPITAL ENCOUNTER (OUTPATIENT)
Age: 64
Discharge: HOME | End: 2019-01-22
Payer: COMMERCIAL

## 2019-01-22 DIAGNOSIS — E10.9 TYPE 1 DIABETES MELLITUS NOT AT GOAL (HCC): ICD-10-CM

## 2019-01-22 DIAGNOSIS — E04.2 NONTOXIC MULTINODULAR GOITER: ICD-10-CM

## 2019-01-22 DIAGNOSIS — E03.9 ACQUIRED HYPOTHYROIDISM: ICD-10-CM

## 2019-01-22 DIAGNOSIS — R59.0 LYMPHADENOPATHY, CERVICAL: ICD-10-CM

## 2019-01-22 LAB
AVERAGE GLUCOSE: 219 MG/DL (ref 70–126)
HBA1C MFR BLD: 9.3 % (ref 4.4–6.4)
T4 FREE: 1.47 NG/DL (ref 0.93–1.76)
TSH SERPL DL<=0.05 MIU/L-ACNC: 0.75 UIU/ML (ref 0.4–4.2)

## 2019-01-22 PROCEDURE — 83036 HEMOGLOBIN GLYCOSYLATED A1C: CPT

## 2019-01-22 PROCEDURE — 84443 ASSAY THYROID STIM HORMONE: CPT

## 2019-01-22 PROCEDURE — 36415 COLL VENOUS BLD VENIPUNCTURE: CPT

## 2019-01-22 PROCEDURE — 76536 US EXAM OF HEAD AND NECK: CPT

## 2019-01-22 PROCEDURE — 84439 ASSAY OF FREE THYROXINE: CPT

## 2019-01-24 ENCOUNTER — TELEPHONE (OUTPATIENT)
Dept: INTERNAL MEDICINE CLINIC | Age: 64
End: 2019-01-24

## 2019-01-24 DIAGNOSIS — R59.9 ENLARGED LYMPH NODE: ICD-10-CM

## 2019-01-24 DIAGNOSIS — R22.1 MASS OF NECK: Primary | ICD-10-CM

## 2019-01-31 ENCOUNTER — TELEPHONE (OUTPATIENT)
Dept: INTERNAL MEDICINE CLINIC | Age: 64
End: 2019-01-31

## 2019-02-04 ENCOUNTER — TELEPHONE (OUTPATIENT)
Dept: INTERNAL MEDICINE CLINIC | Age: 64
End: 2019-02-04

## 2019-02-11 ENCOUNTER — HOSPITAL ENCOUNTER (OUTPATIENT)
Dept: ULTRASOUND IMAGING | Age: 64
Discharge: HOME | End: 2019-02-11
Payer: COMMERCIAL

## 2019-02-11 DIAGNOSIS — R59.9 ENLARGED LYMPH NODE: ICD-10-CM

## 2019-02-11 PROCEDURE — 88173 CYTOPATH EVAL FNA REPORT: CPT

## 2019-02-11 PROCEDURE — 88185 FLOWCYTOMETRY/TC ADD-ON: CPT

## 2019-02-11 PROCEDURE — 10005 FNA BX W/US GDN 1ST LES: CPT

## 2019-02-11 PROCEDURE — 88172 CYTP DX EVAL FNA 1ST EA SITE: CPT

## 2019-02-11 PROCEDURE — 88184 FLOWCYTOMETRY/ TC 1 MARKER: CPT

## 2019-02-14 LAB — LEUKEMIA/LYMPHOMA PHENOTYPING MISC: NORMAL

## 2019-02-18 ENCOUNTER — TELEPHONE (OUTPATIENT)
Dept: INTERNAL MEDICINE CLINIC | Age: 64
End: 2019-02-18

## 2019-03-07 ENCOUNTER — OFFICE VISIT (OUTPATIENT)
Dept: INTERNAL MEDICINE CLINIC | Age: 64
End: 2019-03-07
Payer: COMMERCIAL

## 2019-03-07 VITALS
RESPIRATION RATE: 16 BRPM | HEIGHT: 69 IN | WEIGHT: 235.5 LBS | HEART RATE: 76 BPM | SYSTOLIC BLOOD PRESSURE: 130 MMHG | BODY MASS INDEX: 34.88 KG/M2 | DIASTOLIC BLOOD PRESSURE: 63 MMHG

## 2019-03-07 DIAGNOSIS — I10 ESSENTIAL HYPERTENSION: ICD-10-CM

## 2019-03-07 DIAGNOSIS — E04.2 NONTOXIC MULTINODULAR GOITER: ICD-10-CM

## 2019-03-07 DIAGNOSIS — E78.5 HYPERLIPIDEMIA, UNSPECIFIED HYPERLIPIDEMIA TYPE: ICD-10-CM

## 2019-03-07 DIAGNOSIS — E10.8 DM (DIABETES MELLITUS), TYPE 1 WITH COMPLICATIONS (HCC): Primary | ICD-10-CM

## 2019-03-07 DIAGNOSIS — R59.9 ENLARGED LYMPH NODE: ICD-10-CM

## 2019-03-07 DIAGNOSIS — H35.00 RETINOPATHY: ICD-10-CM

## 2019-03-07 DIAGNOSIS — E03.9 ACQUIRED HYPOTHYROIDISM: ICD-10-CM

## 2019-03-07 PROCEDURE — 99214 OFFICE O/P EST MOD 30 MIN: CPT | Performed by: NURSE PRACTITIONER

## 2019-03-07 ASSESSMENT — PATIENT HEALTH QUESTIONNAIRE - PHQ9
SUM OF ALL RESPONSES TO PHQ QUESTIONS 1-9: 0
SUM OF ALL RESPONSES TO PHQ9 QUESTIONS 1 & 2: 0
2. FEELING DOWN, DEPRESSED OR HOPELESS: 0
1. LITTLE INTEREST OR PLEASURE IN DOING THINGS: 0
SUM OF ALL RESPONSES TO PHQ QUESTIONS 1-9: 0

## 2019-03-08 RX ORDER — LEVOTHYROXINE SODIUM 137 MCG
TABLET ORAL
Qty: 96 TABLET | Refills: 1 | Status: SHIPPED | OUTPATIENT
Start: 2019-03-08 | End: 2019-12-16 | Stop reason: SDUPTHER

## 2019-04-18 ENCOUNTER — OFFICE VISIT (OUTPATIENT)
Dept: INTERNAL MEDICINE CLINIC | Age: 64
End: 2019-04-18
Payer: COMMERCIAL

## 2019-04-18 VITALS
DIASTOLIC BLOOD PRESSURE: 76 MMHG | HEIGHT: 69 IN | BODY MASS INDEX: 34.75 KG/M2 | HEART RATE: 66 BPM | WEIGHT: 234.6 LBS | SYSTOLIC BLOOD PRESSURE: 138 MMHG

## 2019-04-18 DIAGNOSIS — I10 ESSENTIAL HYPERTENSION: ICD-10-CM

## 2019-04-18 DIAGNOSIS — Z86.39 HISTORY OF HYPERTHYROIDISM: ICD-10-CM

## 2019-04-18 DIAGNOSIS — E10.8 DM (DIABETES MELLITUS), TYPE 1 WITH COMPLICATIONS (HCC): Primary | ICD-10-CM

## 2019-04-18 DIAGNOSIS — E03.9 ACQUIRED HYPOTHYROIDISM: ICD-10-CM

## 2019-04-18 DIAGNOSIS — E89.0 S/P TOTAL THYROIDECTOMY: ICD-10-CM

## 2019-04-18 LAB — HBA1C MFR BLD: 9.7 % (ref 4.3–5.7)

## 2019-04-18 PROCEDURE — 99214 OFFICE O/P EST MOD 30 MIN: CPT | Performed by: NURSE PRACTITIONER

## 2019-04-18 PROCEDURE — 83036 HEMOGLOBIN GLYCOSYLATED A1C: CPT | Performed by: NURSE PRACTITIONER

## 2019-04-18 RX ORDER — VITAMIN E 268 MG
400 CAPSULE ORAL DAILY
COMMUNITY

## 2019-04-18 RX ORDER — EZETIMIBE 10 MG/1
10 TABLET ORAL DAILY
COMMUNITY

## 2019-04-18 NOTE — PATIENT INSTRUCTIONS
More consistent metering. Take sugars fasting in the morning, and before lunch and dinner every day. If not checking sugars, don't take short acting insulin  Reduce Lantus to 12 units pm. Continue 18 units in am for now. Call if any further low blood sugars. Meter download in one week. Patient Education        Diabetes Blood Sugar Emergencies: Your Action Plan  How can you prevent a blood sugar emergency? An important part of living with diabetes is keeping your blood sugar in your target range. You'll need to know what to do if it's too high or too low. Managing your blood sugar levels helps you avoid emergencies. This care sheet will teach you about the signs of high and low blood sugar. It will help you make an action plan with your doctor for when these signs occur. Low blood sugar is more likely to happen if you take certain medicines for diabetes. It can also happen if you skip a meal, drink alcohol, or exercise more than usual.  You may get high blood sugar if you eat differently than you normally do. One example is eating more carbohydrate than usual. Having a cold, the flu, or other sudden illness can also cause high blood sugar levels. Levels can also rise if you miss a dose of medicine. Any change in how you take your medicine may affect your blood sugar level. So it's important to work with your doctor before you make any changes. Check your blood sugar  Work with your doctor to fill in the blank spaces below that apply to you. Track your levels, know your target range, and write down ways you can get your blood sugar back in your target range. A log book can help you track your levels. Take the book to all of your medical appointments. · Check your blood sugar _____ times a day, at these times:________________________________________________.   (For example: Before meals, at bedtime, before exercise, during exercise, other.)  · Your blood sugar target range before a meal is ___________________. Your blood sugar target range after a meal is _______________________. · Do this--___________________________________________________--to get your blood sugar back within your safe range if your blood sugar results are _________________________________________. (For example: Less than 70 or above 250 mg/dL.)  Call your doctor when your blood sugar results are ___________________________________. (For example: Less than 70 or above 250 mg/dL.)  What are the symptoms of low and high blood sugar? Common symptoms of low blood sugar are sweating and feeling shaky, weak, hungry, or confused. Symptoms can start quickly. Common symptoms of high blood sugar are feeling very thirsty or very hungry. You may also pass urine more often than usual. You may have blurry vision and may lose weight without trying. But some people may have high or low blood sugar without having any symptoms. That's a good reason to check your blood sugar on a regular schedule. What should you do if you have symptoms? Work with your doctor to fill in the blank spaces below that apply to you. Low blood sugar  If you have symptoms of low blood sugar, check your blood sugar. If it's below _____ ( for example, below 70), eat or drink a quick-sugar food that has about 15 grams of carbohydrate. Your goal is to get your level back to your safe range. Check your blood sugar again 15 minutes later. If it's still not in your target range, take another 15 grams of carbohydrate and check your blood sugar again in 15 minutes. Repeat this until you reach your target. Then go back to your regular testing schedule. When you have low blood sugar, it's best to stop or reduce any physical activity until your blood sugar is back in your target range and is stable. If you must stay active, eat or drink 30 grams of carbohydrate. Then check your blood sugar again in 15 minutes.  If it's not in your target range, take another 30 grams of carbohydrates. Check your blood sugar again in 15 minutes. Keep doing this until you reach your target. You can then go back to your regular testing schedule. If your symptoms or blood sugar levels are getting worse or have not improved after 15 minutes, seek medical care right away. Here are some examples of quick-sugar foods with 15 grams of carbohydrate:  · 3 or 4 glucose tablets  · 1 tube of glucose gel  · Hard candy (such as 3 Jolly Ranchers or 5 to 7 Life Savers)  · ½ cup to ¾ cup (4 to 6 ounces) of fruit juice or regular (not diet) soda  High blood sugar  If you have symptoms of high blood sugar, check your blood sugar. Your goal is to get your level back to your target range. If it's above ______ ( for example, above 250), follow these steps:  · If you missed a dose of your diabetes medicine, take it now. Take only the amount of medicine that you have been prescribed. Do not take more or less medicine. · Give yourself insulin if your doctor has prescribed it for high blood sugar. · Test for ketones, if the doctor told you to do so. If the results of the ketone test show a moderate-to-large amount of ketones, call the doctor for advice. · Wait 30 minutes after you take the extra insulin or the missed medicine. Check your blood sugar again. If your symptoms or blood sugar levels are getting worse or have not improved after taking these steps, seek medical care right away. Follow-up care is a key part of your treatment and safety. Be sure to make and go to all appointments, and call your doctor if you are having problems. It's also a good idea to know your test results and keep a list of the medicines you take. Where can you learn more? Go to https://Syntasiapeapeweb.[employer]myCasa. org and sign in to your [employer] account. Enter T839 in the Sc[employer] box to learn more about \"Diabetes Blood Sugar Emergencies: Your Action Plan. \"     If you do not have an account, please click on the \"Sign Up squash; ½ of a small baked potato; ½ cup of cooked beans; or ½ cup cooked corn or green peas. · Learn how much carbs to eat each day and at each meal. A dietitian or CDE can teach you how to keep track of the amount of carbs you eat. This is called carbohydrate counting. · If you are not sure how to count carbohydrate grams, use the Plate Method to plan meals. It is a good, quick way to make sure that you have a balanced meal. It also helps you spread carbs throughout the day. ? Divide your plate by types of foods. Put non-starchy vegetables on half the plate, meat or other protein food on one-quarter of the plate, and a grain or starchy vegetable in the final quarter of the plate. To this you can add a small piece of fruit and 1 cup of milk or yogurt, depending on how many carbs you are supposed to eat at a meal.  · Try to eat about the same amount of carbs at each meal. Do not \"save up\" your daily allowance of carbs to eat at one meal.  · Proteins have very little or no carbs per serving. Examples of proteins are beef, chicken, turkey, fish, eggs, tofu, cheese, cottage cheese, and peanut butter. A serving size of meat is 3 ounces, which is about the size of a deck of cards. Examples of meat substitute serving sizes (equal to 1 ounce of meat) are 1/4 cup of cottage cheese, 1 egg, 1 tablespoon of peanut butter, and ½ cup of tofu. How can you eat out and still eat healthy? · Learn to estimate the serving sizes of foods that have carbohydrate. If you measure food at home, it will be easier to estimate the amount in a serving of restaurant food. · If the meal you order has too much carbohydrate (such as potatoes, corn, or baked beans), ask to have a low-carbohydrate food instead. Ask for a salad or green vegetables. · If you use insulin, check your blood sugar before and after eating out to help you plan how much to eat in the future.   · If you eat more carbohydrate at a meal than you had planned, take a walk or do other exercise. This will help lower your blood sugar. What else should you know? · Limit saturated fat, such as the fat from meat and dairy products. This is a healthy choice because people who have diabetes are at higher risk of heart disease. So choose lean cuts of meat and nonfat or low-fat dairy products. Use olive or canola oil instead of butter or shortening when cooking. · Don't skip meals. Your blood sugar may drop too low if you skip meals and take insulin or certain medicines for diabetes. · Check with your doctor before you drink alcohol. Alcohol can cause your blood sugar to drop too low. Alcohol can also cause a bad reaction if you take certain diabetes medicines. Follow-up care is a key part of your treatment and safety. Be sure to make and go to all appointments, and call your doctor if you are having problems. It's also a good idea to know your test results and keep a list of the medicines you take. Where can you learn more? Go to https://CerephexpeeMoov.[employer]. org and sign in to your [employer] [place] account. Enter P222 in the Veebox box to learn more about \"Learning About Diabetes Food Guidelines. \"     If you do not have an account, please click on the \"Sign Up Now\" link. Current as of: July 25, 2018  Content Version: 11.9  © 1195-8062 [employer]. Care instructions adapted under license by [facility] ([place] [facility]). If you have questions about a medical condition or this instruction, always ask your healthcare professional. [place] [address] any warranty or liability for your use of this information.

## 2019-04-18 NOTE — PROGRESS NOTES
[facility] PROFESSIONAL SERVS  PHYSICIANS [facility]. [facility] [address], [address]  Dept: [phone]  Dept Fax: [phone]      Juan C Hernández  60 y.o.  [id number]  4/18/19    Chief Complaint   Patient presents with    Diabetes     6 week follow up    Hypothyroidism    Hypertension       Current concerns - Juan C Hernández presents for follow up of DM, hypothyroidism, HTN. I last saw the patient 3/07/19. This patient is followed regularly by Dr. Lilia Gabriel. DM type 1 -  Last A1C 9.7 today. Lantus 18 am, 16 pm. Humalog 8 units TID with meals plus scale. She is not routinely checking glucose, and missing a lot of her insulin doses. When her sugars get really high she will start taking insulin. States she is busy with her mother in law and  all of the time right now. Appetite is not the best, states at times does not eat breakfast until 10 am, but uses a protein shake instead. Skips meals at times, misses insulin at times. She had two 0400 lows the last two weeks. Will reduce night time Lantus to 10 units for now. Advised pt to be sure to check sugars three times daily and do not take insulin if not checking sugars. Hypothyroidism, toxic MNG, s/p total thyroidectomy by Dr. Vale Dai 12/27/13 due to hyperthyroidism previously treated with Tapezole - States energy is always low. Last TSH 0.754 on 1/22/19. Denies any dysphagia, dysphonia, fullness in her throat, eric eyes, brittle hair/nails. Recheck TSH, FT3, FT4.     /76. On Midodrine 5 mg twice daily, likely postural hypotension from autonomic dysfunction due to poorly controlled DM1. HLD - On Zetia, followed by Lowell Kelly.      Past Medical History:   Diagnosis Date    Depression     Fibromyalgia     Glaucoma     Hyperthyroidism     Meningitis 10-    viral     Meningitis spinal 10/04/2013    Viral    Osteoarthritis     Type I (juvenile type) diabetes mellitus without mention of complication, not stated as uncontrolled 2000      Prior to Admission medications    Medication Sig Start Date End Date Taking?  Authorizing Provider   ezetimibe (ZETIA) 10 MG tablet Take 10 mg by mouth daily   Yes Historical Provider, MD   vitamin E 400 UNIT capsule Take 400 Units by mouth daily   Yes Historical Provider, MD   Multiple Vitamins-Minerals (HAIR SKIN AND NAILS FORMULA PO) Take 1 tablet by mouth daily   Yes Historical Provider, MD   SYNTHROID 137 MCG tablet TAKE ONE TABLET DAILY WITH AN EXTRA ONE-HALF (1/2) TABLET ONE DAY PER WEEK 3/8/19  Yes GONSALO Forrest CNP   rivaroxaban (XARELTO) 15 MG TABS tablet Take 15 mg by mouth daily   Yes Historical Provider, MD   PARoxetine (PAXIL) 10 MG tablet Take 10 mg by mouth every morning   Yes Historical Provider, MD   Valerian 500 MG CAPS Take 2,000 mg by mouth nightly    Yes Historical Provider, MD   Bimatoprost (LUMIGAN OP) Place 1 drop into both eyes daily   Yes Historical Provider, MD   insulin glargine (LANTUS SOLOSTAR) 100 UNIT/ML injection pen INJECT [address] A DAY  Patient taking differently: INJECT 18 units in AM and 16 units in PM 1/21/19  Yes GONSALO Forrest CNP   insulin lispro (HUMALOG KWIKPEN) 100 UNIT/ML pen INJECT 8 UNITS THREE TIMES A DAY BEFORE MEALS PLUS SLIDING SCALE. (MAX DOSE 48 UNITS/DAY) 1/21/19  Yes GONSALO Forrest CNP   perindopril (ACEON) 2 MG tablet TAKE 2 TABLETS DAILY 1/21/19  Yes GONSALO Doherty CNP   midodrine (PROAMATINE) 5 MG tablet Take 5 mg by mouth 2 times daily   Yes Historical Provider, MD   Omega 3 1200 MG CAPS Take 1,200 capsules by mouth daily   Yes Historical Provider, MD   brimonidine-timolol (COMBIGAN) 0.2-0.5 % ophthalmic solution Place 1 drop into both eyes 3 times daily    Yes Historical Provider, MD   metoprolol tartrate (LOPRESSOR) 50 MG tablet Take 25 mg by mouth 2 times daily   Yes Historical Provider, MD   calcium carbonate (TUMS CALCIUM FOR LIFE BONE) 750 MG chewable tablet Take 1 tablet by mouth daily. Yes Historical Provider, MD   aspirin 81 MG tablet Take 81 mg by mouth daily. Yes Historical Provider, MD   therapeutic multivitamin-minerals (THERAGRAN-M) tablet Take 1 tablet by mouth daily. Yes Historical Provider, MD   Ascorbic Acid (VITAMIN C) 500 MG tablet Take 1,000 mg by mouth daily. Yes Historical Provider, MD   Chromium Picolinate 1000 MCG TABS Take 2,000 mcg by mouth daily. Yes Historical Provider, MD   glucagon 1 MG injection Inject 1 mg into the skin See Admin Instructions Follow package directions for low blood sugar. 3/1/18   Daniel Auguste MD   acetone, urine, test strip Please test urine for ketones if you have nausea, vomiting and abdominal pain. 3/1/18   Daniel Auguste MD        Allergies   Allergen Reactions    Milk-Related Compounds Other (See Comments)     Severe constipation    Pcn [Penicillins] Rash       Review of Systems - General ROS: negative for - chills, fatigue, fever or malaise  Psychological ROS: negative for - anxiety or depression  Hematological and Lymphatic ROS: No history of blood clots or bleeding disorder. Respiratory ROS: no cough, shortness of breath, or wheezing  Cardiovascular ROS: no chest pain or dyspnea on exertion  Gastrointestinal ROS: no abdominal pain, change in bowel habits, or black or bloody stools  Genito-Urinary ROS: no dysuria, trouble voiding, or hematuria  Musculoskeletal ROS: negative for - joint pain, joint swelling, muscle pain or muscular weakness  Neurological ROS: negative for - confusion, dizziness, headaches, impaired coordination/balance, numbness/tingling or weakness  Dermatological ROS: negative for - rash or wounds    Blood pressure 138/76, pulse 66, height 5' 8.5\" (1.74 m), weight 234 lb 9.6 oz (106.4 kg), last menstrual period 01/21/2013. Physical Examination:   Constitutional - Alert, cooperative, well groomed, and in no distress.  Vital signs stable   Vitals:    04/18/19 1548   BP: 138/76   Pulse: 66 Mental status - Alert and oriented to person, place, and time. Good insight, appropriate responses, mood appropriate. Head - Atraumatic, normocephalic. Eyes - Pupils equal and round  Ears - External ears are normal, hearing is grossly normal to speech  Mouth - Oropharynx clear, mucous membranes pink and moist, dentition intact. Neck - supple, no significant adenopathy, no JVD. Chest - No distress. No increased work of breathing. Clear to auscultation in all fields, no wheezes, rales or rhonchi, symmetric air entry.    Heart - normal rate, regular rhythm, normal S1, S2, no murmurs, rubs  or gallops  Abdomen -soft, nontender, nondistended  Neurological - alert, oriented, grossly intact  Extremities - peripheral pulses normal, no pedal edema, no clubbing or cyanosis  Skin - warm and dry, no rashes, lesions, or wounds evident on exposed skin    Diagnostic Data:  I have reviewed recent diagnostic testing including labs    Lab Results   Component Value Date     05/07/2019    K 4.9 05/07/2019     05/07/2019    CO2 26 05/07/2019    BUN 9 05/07/2019    CREATININE 0.9 05/07/2019    GLUCOSE 114 05/07/2019    GLUCOSE 230 10/26/2015    CALCIUM 9.5 05/07/2019      Lab Results   Component Value Date    LABA1C 9.7 (H) 04/18/2019     Lab Results   Component Value Date     12/27/2016     Lab Results   Component Value Date    CHOL 189 05/07/2019    CHOL 236 (H) 01/12/2019    CHOL 267 (H) 06/14/2018     Lab Results   Component Value Date    TRIG 157 05/07/2019    TRIG 133 01/12/2019    TRIG 244 (H) 06/14/2018     Lab Results   Component Value Date    HDL 63 05/07/2019    HDL 64 01/12/2019    HDL 63 06/14/2018     Lab Results   Component Value Date    LDLCALC 95 05/07/2019    LDLCALC 145 01/12/2019    LDLCALC 155 06/14/2018     Lab Results   Component Value Date    VLDL 38 12/27/2016    VLDL 43 09/12/2016    VLDL 22 10/26/2015     Lab Results   Component Value Date    CHOLHDLRATIO 3.8 09/12/2016 CHOLHDLRATIO 2.8 05/02/2014     No results found for: WBC, HGB, HCT, MCV, PLT    2/11/19 US FNA    Narrative   PROCEDURE: US FINE NEEDLE ASPIRATION       CLINICAL INFORMATION: Enlarged lymph node.       COMPARISON: Ultrasound of the neck dated January 22, 2019.       CONSENT: The risks, benefits and alternatives of the procedure were discussed with the patient. Verbal and signed informed consent was obtained.        TIMEOUT: A timeout was performed to confirm the correct patient, procedure and site prior to performing this procedure.        LOCALIZATION: With the patient in the supine position, the target lymph node was identified by ultrasound. A skin site was marked and the skin was prepped and draped in the usual sterile fashion. Local anesthesia was obtained with 2% local lidocaine.       LOCATION: A lymph node measuring 2.3 x 0.5 x 0.9 cm within the right side of the neck was targeted for biopsy.       NEEDLE(s): 25-gauge fine-needle       FINDINGS:       3 needle passes were performed of the target lymph node within the right side of the neck under ultrasound guidance. A cytopathologist was present on-site to determine the sample to be adequate.           Impression       Status post ultrasound-guided fine needle aspiration of a lymph node within the right neck. 1/22/19 US head/neck    Narrative   PROCEDURE: US HEAD NECK SOFT TISSUE THYROID       CLINICAL INFORMATION: Nontoxic multinodular goiter, Lymphadenopathy, cervical.       COMPARISON: Ultrasound of the thyroid dated October 25, 2018.       TECHNIQUE: Ultrasound images of the thyroid gland were obtained with a linear transducer.       FINDINGS:       Prominent lymph nodes are again noted within the neck. A representative lymph node within the right side of the neck measures 2.3 x 0.6 x 0.5 cm and demonstrates a reniform shape and fatty hilum.       A lymph node within the medial left neck measures 1.9 x 0.8 x 0.7 cm and demonstrates a reniform shape. It appears slightly more prominent compared to the prior exam. Additional prominent lymph nodes are noted within the left side of the neck.           Impression       There is redemonstration of prominent lymph nodes within the neck.  These do not demonstrate abnormal morphology and appears similar to the prior exam. These are most likely reactive however, clinical correlation with laboratory values to rule out    underlying systemic disease would be beneficial.             2/11/19 Flow cytometry  Leuk/Lymph Phenotype, Source                 Other   Number Of Markers                               18                   marker   Leuk/Lymph Phenotype, % CD2                     98                   %   Leuk/Lymph Phenotype, % CD3                     95                   %   Leuk/Lymph Phenotype, % CD4                     68                   %   Leuk/Lymph Phenotype, % CD5                     96                   %   Leuk/Lymph Phenotype, % CD7                     90                   %   Leuk/Lymph Phenotype, % CD8                     12                   %   Leuk/Lymph Phenotype, % CD16                     1                   %   Leuk/Lymph Phenotype, % CD10                     1                   %   Leuk/Lymph Phenotype, % CD19                     1                   %   Leuk/Lymph Phenotype, % CD20                     2                   %   Leuk/Lymph Phenotype, % Kappa             See Note                   %   Leuk/Lymph Phenotype, % Lambda            See Note                   %   Leuk/Lymph Phenotype, % Cyto Kappa             < 1                   %   Leuk/Lymph Phenotype, % Cyto Lambda            < 1                   %   Leuk/Lymph Phenotype, % CD34                   < 1                   %   Leuk/Lymph Phenotype, % CD13                     1                   %   Leuk/Lymph Phenotype, % CD38                   < 1                   %   Leuk/Lymph Phenotype, % CD45              See Note                   % Leuk/Lymph Phenotype, Impression          See Note       RIGHT NECK LYMPH NODE, FLOW CYTOMETRIC IMPRESSION:   Paucicellular specimen with no flow cytometric evidence of   monoclonality, plasma cell dyscrasia, or non-Hodgkin lymphoma. See   comment. COMMENT:   Please correlate the results from this study morphologically and   clinically for proper interpretation, and keep in mind that flow   cytometric immunophenotyping is an ancillary study that is not   intended to be used to completely exclude the possibility of a   hematopoietic neoplasm. ANALYSIS:   Flow cytometric analysis of the right neck lymph node fluid   specimen indicates that almost all of the CD45 positive leukocytes   are lymphocytes. Although few in number, 95% of the lymphocytes   (percentages shown) are T-cells, 1% are NK cells and 2% are   B-cells. The T-cells are heterogeneous and have a mildly elevated   CD4:CD8 ratio of 6.4. The B-cells are too few in number to assess   clonality by surface light chain staining. Plasma cells (dim to   negative CD45, bright CD38, bright cytoplasmic light chains) are   not identified. Approximately 30% of the light scatter events do   not show specific staining for CD45, or any of the surface markers   shown above, and could represent a mixture of nonviable cells,   cellular debris, and possibly nonhematopoietic cells. The   viability of the specimen is 78%. These results have been reviewed and approved by Efren Fitch MD,   PhD. 02/14/2019 10:26     2/11/19  Clinical Information:  RIGHT NECK LYMPH NODE    SOURCE:  A) FINE NEEDLE ASPIRATE THYROID, RIGHT NECK    Source Description:                 Materials Prepared & Examined:  Volume. ........... Lord Bonilla 1 ml            Smear Slides. .............. 2  Consistency. .. Lord Bonilla Lord Bonilla Lord Bonilla Thin              Monolayers. ............... Lord Bonilla 1        RAPID ONSITE EVALUATION:   FNA 1-lymphoid sample.  SIO      FINAL RESULTS:  Benign lymphoid sample.     Comment: The specimen consist of scant polymorphous lymphocytes.  Flow  cytometric analysis shows no evidence of monoclonality, plasma cell  dyscrasia, or non-Hodgkin lymphoma. Assessment/Plan:   Diagnosis Orders   1. DM (diabetes mellitus), type 1 with complications (HCC)  POCT glycosylated hemoglobin (Hb A1C)   2. Essential hypertension     3. Acquired hypothyroidism     4. History of hyperthyroidism     5. S/P total thyroidectomy         Orders Placed This Encounter   Procedures    POCT glycosylated hemoglobin (Hb A1C)     1) DM 1, poorly controlled with A1C 9.7 today  More consistent metering. Take sugars fasting in the morning, and before lunch and dinner every day. If not checking sugars, don't take short acting insulin  Reduce Lantus to 12 units pm. Continue 18 units in am for now. Call if any further low blood sugars. Education given for dealing with diabetic diet and low blood sugars. Meter download in one week. 2) HTN, with current postural hypotension on Midodrine, BP stable. 3) Hypothryoidism s/p total thyroidectomy due to hyperthyrodism  Recheck TSH, FT3, FT4  See if radiology can comment on the thyroid bed on US from January. Will schedule follow up appointment in 6 weeks. Electronically signed by GONSALO Snider CNP 05/17/19 10:28 AM     [address]  [place], [address]     Phone number: [phone]  Fax number: [phone]

## 2019-04-24 ENCOUNTER — TELEPHONE (OUTPATIENT)
Dept: INTERNAL MEDICINE CLINIC | Age: 64
End: 2019-04-24

## 2019-04-26 NOTE — TELEPHONE ENCOUNTER
Please make sure pt is taking her Lantus 18 units am, 16 units pm, and her Humalog coverage with all meals. Wondering if she missed her Lantus on 4/23 that caused her to be high at 405 on 4/24. Is she having lows with taking her mealtime coverage? Or is she not eating? Joseue the more frequent glucose checks, fasting every am, before each meal and at bedtime. She did a great job with this on 4/23, appeasr her sugars were looking better but then maybe didn't really get any insulin in on 4/22? ? Meter download in two weeks.

## 2019-05-07 ENCOUNTER — HOSPITAL ENCOUNTER (OUTPATIENT)
Age: 64
Discharge: HOME | End: 2019-05-07
Payer: COMMERCIAL

## 2019-05-07 DIAGNOSIS — E10.8 DM (DIABETES MELLITUS), TYPE 1 WITH COMPLICATIONS (HCC): ICD-10-CM

## 2019-05-07 LAB
ALBUMIN SERPL-MCNC: 4.3 G/DL (ref 3.5–5.1)
ALP BLD-CCNC: 116 U/L (ref 38–126)
ALT SERPL-CCNC: 29 U/L (ref 11–66)
ANION GAP SERPL CALCULATED.3IONS-SCNC: 12 MEQ/L (ref 8–16)
AST SERPL-CCNC: 28 U/L (ref 5–40)
BILIRUB SERPL-MCNC: 0.4 MG/DL (ref 0.3–1.2)
BILIRUBIN DIRECT: < 0.2 MG/DL (ref 0–0.3)
BUN BLDV-MCNC: 9 MG/DL (ref 7–22)
CALCIUM SERPL-MCNC: 9.5 MG/DL (ref 8.5–10.5)
CHLORIDE BLD-SCNC: 103 MEQ/L (ref 98–111)
CHOLESTEROL, TOTAL: 189 MG/DL (ref 100–199)
CO2: 26 MEQ/L (ref 23–33)
CREAT SERPL-MCNC: 0.9 MG/DL (ref 0.4–1.2)
GFR SERPL CREATININE-BSD FRML MDRD: 63 ML/MIN/1.73M2
GLUCOSE BLD-MCNC: 114 MG/DL (ref 70–108)
HDLC SERPL-MCNC: 63 MG/DL
LDL CHOLESTEROL CALCULATED: 95 MG/DL
POTASSIUM SERPL-SCNC: 4.9 MEQ/L (ref 3.5–5.2)
SODIUM BLD-SCNC: 141 MEQ/L (ref 135–145)
TOTAL PROTEIN: 8 G/DL (ref 6.1–8)
TRIGL SERPL-MCNC: 157 MG/DL (ref 0–199)

## 2019-05-07 PROCEDURE — 80053 COMPREHEN METABOLIC PANEL: CPT

## 2019-05-07 PROCEDURE — 36415 COLL VENOUS BLD VENIPUNCTURE: CPT

## 2019-05-07 PROCEDURE — 82248 BILIRUBIN DIRECT: CPT

## 2019-05-07 PROCEDURE — 80061 LIPID PANEL: CPT

## 2019-05-17 ENCOUNTER — TELEPHONE (OUTPATIENT)
Dept: INTERNAL MEDICINE CLINIC | Age: 64
End: 2019-05-17

## 2019-05-17 DIAGNOSIS — E03.9 ACQUIRED HYPOTHYROIDISM: Primary | ICD-10-CM

## 2019-05-25 ENCOUNTER — HOSPITAL ENCOUNTER (OUTPATIENT)
Age: 64
Discharge: HOME | End: 2019-05-25
Payer: COMMERCIAL

## 2019-05-25 DIAGNOSIS — E03.9 ACQUIRED HYPOTHYROIDISM: ICD-10-CM

## 2019-05-25 LAB
T3 FREE: 2.49 PG/ML (ref 2.02–4.43)
T4 FREE: 1.41 NG/DL (ref 0.93–1.76)
TSH SERPL DL<=0.05 MIU/L-ACNC: 2.27 UIU/ML (ref 0.4–4.2)

## 2019-05-25 PROCEDURE — 36415 COLL VENOUS BLD VENIPUNCTURE: CPT

## 2019-05-25 PROCEDURE — 84439 ASSAY OF FREE THYROXINE: CPT

## 2019-05-25 PROCEDURE — 84481 FREE ASSAY (FT-3): CPT

## 2019-05-25 PROCEDURE — 84443 ASSAY THYROID STIM HORMONE: CPT

## 2019-05-28 ENCOUNTER — TELEPHONE (OUTPATIENT)
Dept: INTERNAL MEDICINE CLINIC | Age: 64
End: 2019-05-28

## 2019-05-28 NOTE — TELEPHONE ENCOUNTER
Patient notified of Meredith Murphy recommendations and voiced understanding.  See telephone encounter from 5/17/19 regarding thyroid ultrasound

## 2019-05-28 NOTE — TELEPHONE ENCOUNTER
----- Message from GONSALO Weinstein CNP sent at 5/28/2019  8:20 AM EDT -----  Let pt know TFT's look ok. Did we find out if radiology could comment on thyroid bed on US from 1/22/19? ?

## 2019-06-24 ENCOUNTER — HOSPITAL ENCOUNTER (OUTPATIENT)
Dept: CT IMAGING | Age: 64
Discharge: HOME | End: 2019-06-24
Payer: COMMERCIAL

## 2019-06-24 DIAGNOSIS — R10.9 STOMACH ACHE: ICD-10-CM

## 2019-06-24 PROCEDURE — 74176 CT ABD & PELVIS W/O CONTRAST: CPT

## 2019-07-17 ENCOUNTER — OFFICE VISIT (OUTPATIENT)
Dept: INTERNAL MEDICINE CLINIC | Age: 64
End: 2019-07-17
Payer: COMMERCIAL

## 2019-07-17 VITALS
DIASTOLIC BLOOD PRESSURE: 59 MMHG | WEIGHT: 226.5 LBS | SYSTOLIC BLOOD PRESSURE: 117 MMHG | BODY MASS INDEX: 33.93 KG/M2 | HEART RATE: 79 BPM | RESPIRATION RATE: 14 BRPM

## 2019-07-17 DIAGNOSIS — E89.0 S/P TOTAL THYROIDECTOMY: ICD-10-CM

## 2019-07-17 DIAGNOSIS — E04.2 NONTOXIC MULTINODULAR GOITER: ICD-10-CM

## 2019-07-17 DIAGNOSIS — E03.9 HYPOTHYROIDISM, UNSPECIFIED TYPE: ICD-10-CM

## 2019-07-17 DIAGNOSIS — E10.8 DM (DIABETES MELLITUS), TYPE 1 WITH COMPLICATIONS (HCC): Primary | ICD-10-CM

## 2019-07-17 DIAGNOSIS — R59.9 ENLARGED LYMPH NODE: ICD-10-CM

## 2019-07-17 DIAGNOSIS — E78.5 HYPERLIPIDEMIA, UNSPECIFIED HYPERLIPIDEMIA TYPE: ICD-10-CM

## 2019-07-17 LAB — HBA1C MFR BLD: 9.9 % (ref 4.3–5.7)

## 2019-07-17 PROCEDURE — 83036 HEMOGLOBIN GLYCOSYLATED A1C: CPT | Performed by: NURSE PRACTITIONER

## 2019-07-17 PROCEDURE — 99214 OFFICE O/P EST MOD 30 MIN: CPT | Performed by: NURSE PRACTITIONER

## 2019-07-17 NOTE — PATIENT INSTRUCTIONS
Continue Synthroid 137 mcg daily  Continue Zetia 10 mg daily     Increase attention to three times daily Humalog 8 units plus scale at meals, and twice daily Lantus 18 units am, 12 units pm.   3-4 times daily glucose checks. Send in sugars in two weeks. Extensive counseling was done regarding diabetes. The goals are to decrease or prevent the complications of diabetes and improve survival. The following goals were discussed  HgbA1c < 7 (providing no hypoglycemia)    BMI  18-25    BP < 130/80    STATIN(medium or high risk)    DIET <20% protein  < 30% fat, no trans fats, calorie restricted if BMI high    URINE MICROALBUMIN/CREATINIE  < 30    DILATED EYE EXAM EVERY 1-2 YEARS  - Continue with Dr. Johnny Lopez every 3 months. MONITOR FEET FOR SORES, NEUROPATHY, ETC    REGULAR DENTAL CARE        Patient Education        Learning About Meal Planning for Diabetes  Why plan your meals? Meal planning can be a key part of managing diabetes. Planning meals and snacks with the right balance of carbohydrate, protein, and fat can help you keep your blood sugar at the target level you set with your doctor. You don't have to eat special foods. You can eat what your family eats, including sweets once in a while. But you do have to pay attention to how often you eat and how much you eat of certain foods. You may want to work with a dietitian or a certified diabetes educator. He or she can give you tips and meal ideas and can answer your questions about meal planning. This health professional can also help you reach a healthy weight if that is one of your goals. What plan is right for you? Your dietitian or diabetes educator may suggest that you start with the plate format or carbohydrate counting. The plate format  The plate format is a simple way to help you manage how you eat. You plan meals by learning how much space each food should take on a plate.  Using the plate format helps you spread carbohydrate throughout the

## 2019-07-17 NOTE — PROGRESS NOTES
[facility]  INTERNAL MEDICINE  [address] [facility] [address]  Dept: [phone]  Dept Fax: [phone]  Loc: [phone]     Visit Date:  7/17/2019    Patient:  Garret Brizuela  YOB: 1955    HPI:     Chief Complaint   Patient presents with    Diabetes    Hypertension    Hypothyroidism       Missed 5/30/19 appt. Had a bronchitis, saw PCP and given atbx. Had UTI/kidney stone, went to ED and had passed the stone. Only had crackers etc for several days, had nausea. Glucose range . 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 Hypotension - Had tilt table. Suspect diabetic autonomic neuropathy. On Midodrine. /59 today. Diabetes - A1C 9.9 today. Forgets her Lantus 18 u am, 12 u pm, states anytime her glucose is over 300 she has missed her insulins. Also on Humalog 8 units TID before meals plus scale. Eats twice daily, getting a small breakfast in, low appetite generally. Hypothyroidism - On synthroid 137 mcg daily. Last TSH 2.270, nl FT3/FT4    Adrenal insuff - Per pt report. Not on steroids.            Medications    Current Outpatient Medications:     ezetimibe (ZETIA) 10 MG tablet, Take 10 mg by mouth daily, Disp: , Rfl:     vitamin E 400 UNIT capsule, Take 400 Units by mouth daily, Disp: , Rfl:     Multiple Vitamins-Minerals (HAIR SKIN AND NAILS FORMULA PO), Take 1 tablet by mouth daily, Disp: , Rfl:     SYNTHROID 137 MCG tablet, TAKE ONE TABLET DAILY WITH AN EXTRA ONE-HALF (1/2) TABLET ONE DAY PER WEEK, Disp: 96 tablet, Rfl: 1    rivaroxaban (XARELTO) 15 MG TABS tablet, Take 15 mg by mouth daily, Disp: , Rfl:     PARoxetine (PAXIL) 10 MG tablet, Take 10 mg by mouth every morning, Disp: , Rfl:     Valerian 500 MG CAPS, Take 2,000 mg by mouth nightly , Disp: , Rfl:     Bimatoprost surgery; knee surgery (Left, 09/04/2013); Colonoscopy; Total Thyroidectomy (12/27/2013); Eye surgery (Left, 6-18-14); and Insertable Cardiac Monitor (Left, 07/13/2017). Family History  This patient's family history includes Diabetes in her father; High Cholesterol in her mother; Thyroid Disease in her mother. Social History  Edgar Mckeon  reports that she has never smoked. She has never used smokeless tobacco. She reports that she does not drink alcohol or use drugs.     Health Maintenance:    Health Maintenance   Topic Date Due    Hepatitis C screen  1955    Pneumococcal 0-64 years Vaccine (1 of 1 - PPSV23) 02/16/1963    HIV screen  02/16/1972    DTaP/Tdap/Td vaccine (1 - Tdap) 02/16/1976    Cervical cancer screen  02/16/1978    Breast cancer screen  02/16/2007    Shingles Vaccine (1 of 2) 02/16/2007    Colon cancer screen colonoscopy  02/16/2007    Diabetic foot exam  03/01/2019    Diabetic microalbuminuria test  06/14/2019    Flu vaccine (1) 09/01/2019    A1C test (Diabetic or Prediabetic)  10/17/2019    Diabetic retinal exam  11/01/2019    Lipid screen  05/07/2020    Potassium monitoring  05/07/2020    Creatinine monitoring  05/07/2020    TSH testing  05/25/2020       Subjective:      Review of Systems    Objective:     BP (!) 117/59   Pulse 79   Resp 14   Wt 226 lb 8 oz (102.7 kg)   LMP 01/21/2013   BMI 33.93 kg/m²     Physical Exam    Labs Reviewed 7/17/2019:    Lab Results   Component Value Date    CHOL 189 05/07/2019    TRIG 157 05/07/2019    HDL 63 05/07/2019    LDLDIRECT 174 (H) 12/27/2016    ALT 29 05/07/2019    AST 28 05/07/2019     05/07/2019    K 4.9 05/07/2019     05/07/2019    CREATININE 0.9 05/07/2019    BUN 9 05/07/2019    CO2 26 05/07/2019    TSH 2.270 05/25/2019    PSA <0.01 11/19/2014    LABA1C 9.9 (H) 07/17/2019    LABMICR < 1.20 06/14/2018   Thyroid US 10/25/18  Narrative   PROCEDURE: US THYROID       CLINICAL INFORMATION: Total thyroidectomy 2013; history

## 2019-08-10 ENCOUNTER — HOSPITAL ENCOUNTER (OUTPATIENT)
Age: 64
Discharge: HOME | End: 2019-08-10
Payer: COMMERCIAL

## 2019-08-10 DIAGNOSIS — E10.8 DM (DIABETES MELLITUS), TYPE 1 WITH COMPLICATIONS (HCC): ICD-10-CM

## 2019-08-10 LAB
ANION GAP SERPL CALCULATED.3IONS-SCNC: 11 MEQ/L (ref 8–16)
BUN BLDV-MCNC: 12 MG/DL (ref 7–22)
CALCIUM SERPL-MCNC: 9.3 MG/DL (ref 8.5–10.5)
CHLORIDE BLD-SCNC: 105 MEQ/L (ref 98–111)
CO2: 26 MEQ/L (ref 23–33)
CREAT SERPL-MCNC: 0.8 MG/DL (ref 0.4–1.2)
CREATININE, URINE: 136.2 MG/DL
GFR SERPL CREATININE-BSD FRML MDRD: 73 ML/MIN/1.73M2
GLUCOSE BLD-MCNC: 127 MG/DL (ref 70–108)
MICROALBUMIN UR-MCNC: < 1.2 MG/DL
MICROALBUMIN/CREAT UR-RTO: 9 MG/G (ref 0–30)
POTASSIUM SERPL-SCNC: 4.5 MEQ/L (ref 3.5–5.2)
SODIUM BLD-SCNC: 142 MEQ/L (ref 135–145)

## 2019-08-10 PROCEDURE — 82043 UR ALBUMIN QUANTITATIVE: CPT

## 2019-08-10 PROCEDURE — 36415 COLL VENOUS BLD VENIPUNCTURE: CPT

## 2019-08-10 PROCEDURE — 80048 BASIC METABOLIC PNL TOTAL CA: CPT

## 2019-09-10 ENCOUNTER — TELEPHONE (OUTPATIENT)
Dept: INTERNAL MEDICINE CLINIC | Age: 64
End: 2019-09-10

## 2019-10-14 ENCOUNTER — OFFICE VISIT (OUTPATIENT)
Dept: INTERNAL MEDICINE CLINIC | Age: 64
End: 2019-10-14
Payer: COMMERCIAL

## 2019-10-14 ENCOUNTER — HOSPITAL ENCOUNTER (OUTPATIENT)
Age: 64
Discharge: HOME | End: 2019-10-14
Payer: COMMERCIAL

## 2019-10-14 VITALS
DIASTOLIC BLOOD PRESSURE: 67 MMHG | HEIGHT: 69 IN | WEIGHT: 228 LBS | HEART RATE: 88 BPM | BODY MASS INDEX: 33.77 KG/M2 | SYSTOLIC BLOOD PRESSURE: 129 MMHG

## 2019-10-14 DIAGNOSIS — R59.9 ENLARGED LYMPH NODE: ICD-10-CM

## 2019-10-14 DIAGNOSIS — E03.9 HYPOTHYROIDISM, UNSPECIFIED TYPE: ICD-10-CM

## 2019-10-14 DIAGNOSIS — E10.65 TYPE 1 DIABETES MELLITUS WITH HYPERGLYCEMIA (HCC): ICD-10-CM

## 2019-10-14 DIAGNOSIS — E10.8 DM (DIABETES MELLITUS), TYPE 1 WITH COMPLICATIONS (HCC): Primary | ICD-10-CM

## 2019-10-14 DIAGNOSIS — E04.2 NONTOXIC MULTINODULAR GOITER: ICD-10-CM

## 2019-10-14 DIAGNOSIS — E10.8 DM (DIABETES MELLITUS), TYPE 1 WITH COMPLICATIONS (HCC): ICD-10-CM

## 2019-10-14 DIAGNOSIS — E78.5 HYPERLIPIDEMIA, UNSPECIFIED HYPERLIPIDEMIA TYPE: ICD-10-CM

## 2019-10-14 DIAGNOSIS — E89.0 S/P TOTAL THYROIDECTOMY: ICD-10-CM

## 2019-10-14 LAB
ANION GAP SERPL CALCULATED.3IONS-SCNC: 14 MEQ/L (ref 8–16)
BASOPHILS # BLD: 0.8 %
BASOPHILS ABSOLUTE: 0.1 THOU/MM3 (ref 0–0.1)
BUN BLDV-MCNC: 13 MG/DL (ref 7–22)
CALCIUM SERPL-MCNC: 9.4 MG/DL (ref 8.5–10.5)
CHLORIDE BLD-SCNC: 103 MEQ/L (ref 98–111)
CO2: 25 MEQ/L (ref 23–33)
CREAT SERPL-MCNC: 0.8 MG/DL (ref 0.4–1.2)
EOSINOPHIL # BLD: 1.7 %
EOSINOPHILS ABSOLUTE: 0.1 THOU/MM3 (ref 0–0.4)
ERYTHROCYTE [DISTWIDTH] IN BLOOD BY AUTOMATED COUNT: 12.6 % (ref 11.5–14.5)
ERYTHROCYTE [DISTWIDTH] IN BLOOD BY AUTOMATED COUNT: 41 FL (ref 35–45)
GFR SERPL CREATININE-BSD FRML MDRD: 73 ML/MIN/1.73M2
GLUCOSE BLD-MCNC: 284 MG/DL (ref 70–108)
HBA1C MFR BLD: 9.3 % (ref 4.3–5.7)
HCT VFR BLD CALC: 40.8 % (ref 37–47)
HEMOGLOBIN: 12.7 GM/DL (ref 12–16)
IMMATURE GRANS (ABS): 0.02 THOU/MM3 (ref 0–0.07)
IMMATURE GRANULOCYTES: 0.3 %
LYMPHOCYTES # BLD: 35.5 %
LYMPHOCYTES ABSOLUTE: 2.2 THOU/MM3 (ref 1–4.8)
MCH RBC QN AUTO: 27.8 PG (ref 26–33)
MCHC RBC AUTO-ENTMCNC: 31.1 GM/DL (ref 32.2–35.5)
MCV RBC AUTO: 89.3 FL (ref 81–99)
MONOCYTES # BLD: 8.1 %
MONOCYTES ABSOLUTE: 0.5 THOU/MM3 (ref 0.4–1.3)
NUCLEATED RED BLOOD CELLS: 0 /100 WBC
PLATELET # BLD: 246 THOU/MM3 (ref 130–400)
PMV BLD AUTO: 9 FL (ref 9.4–12.4)
POTASSIUM SERPL-SCNC: 4.8 MEQ/L (ref 3.5–5.2)
RBC # BLD: 4.57 MILL/MM3 (ref 4.2–5.4)
SEG NEUTROPHILS: 53.6 %
SEGMENTED NEUTROPHILS ABSOLUTE COUNT: 3.4 THOU/MM3 (ref 1.8–7.7)
SODIUM BLD-SCNC: 142 MEQ/L (ref 135–145)
T4 FREE: 1.47 NG/DL (ref 0.93–1.76)
TSH SERPL DL<=0.05 MIU/L-ACNC: 0.58 UIU/ML (ref 0.4–4.2)
WBC # BLD: 6.3 THOU/MM3 (ref 4.8–10.8)

## 2019-10-14 PROCEDURE — 99214 OFFICE O/P EST MOD 30 MIN: CPT | Performed by: NURSE PRACTITIONER

## 2019-10-14 PROCEDURE — 84443 ASSAY THYROID STIM HORMONE: CPT

## 2019-10-14 PROCEDURE — 36415 COLL VENOUS BLD VENIPUNCTURE: CPT

## 2019-10-14 PROCEDURE — 80048 BASIC METABOLIC PNL TOTAL CA: CPT

## 2019-10-14 PROCEDURE — 85025 COMPLETE CBC W/AUTO DIFF WBC: CPT

## 2019-10-14 PROCEDURE — 84439 ASSAY OF FREE THYROXINE: CPT

## 2019-10-14 ASSESSMENT — ENCOUNTER SYMPTOMS
VOMITING: 0
TROUBLE SWALLOWING: 0
SHORTNESS OF BREATH: 0
SORE THROAT: 0
COUGH: 0
CHOKING: 0
EYE ITCHING: 0
DIARRHEA: 0
CONSTIPATION: 0
NAUSEA: 0
ABDOMINAL PAIN: 0

## 2019-10-17 ENCOUNTER — TELEPHONE (OUTPATIENT)
Dept: INTERNAL MEDICINE CLINIC | Age: 64
End: 2019-10-17

## 2019-10-17 DIAGNOSIS — E03.9 HYPOTHYROIDISM, UNSPECIFIED TYPE: Primary | ICD-10-CM

## 2019-12-16 ENCOUNTER — OFFICE VISIT (OUTPATIENT)
Dept: INTERNAL MEDICINE CLINIC | Age: 64
End: 2019-12-16
Payer: COMMERCIAL

## 2019-12-16 VITALS
DIASTOLIC BLOOD PRESSURE: 62 MMHG | HEART RATE: 72 BPM | BODY MASS INDEX: 33.77 KG/M2 | WEIGHT: 228 LBS | HEIGHT: 69 IN | SYSTOLIC BLOOD PRESSURE: 134 MMHG

## 2019-12-16 DIAGNOSIS — E78.5 HYPERLIPIDEMIA, UNSPECIFIED HYPERLIPIDEMIA TYPE: ICD-10-CM

## 2019-12-16 DIAGNOSIS — H35.00 RETINOPATHY: ICD-10-CM

## 2019-12-16 DIAGNOSIS — Z86.39 HISTORY OF HYPERTHYROIDISM: ICD-10-CM

## 2019-12-16 DIAGNOSIS — E89.0 S/P TOTAL THYROIDECTOMY: ICD-10-CM

## 2019-12-16 DIAGNOSIS — I10 ESSENTIAL HYPERTENSION: ICD-10-CM

## 2019-12-16 DIAGNOSIS — E03.9 ACQUIRED HYPOTHYROIDISM: ICD-10-CM

## 2019-12-16 DIAGNOSIS — E10.8 DM (DIABETES MELLITUS), TYPE 1 WITH COMPLICATIONS (HCC): Primary | ICD-10-CM

## 2019-12-16 PROCEDURE — 99214 OFFICE O/P EST MOD 30 MIN: CPT | Performed by: NURSE PRACTITIONER

## 2019-12-16 RX ORDER — LEVOTHYROXINE SODIUM 137 MCG
TABLET ORAL
Qty: 96 TABLET | Refills: 1 | Status: SHIPPED | OUTPATIENT
Start: 2019-12-16 | End: 2020-06-09

## 2019-12-17 ASSESSMENT — ENCOUNTER SYMPTOMS
ABDOMINAL PAIN: 0
COUGH: 0
CHOKING: 0
NAUSEA: 0
TROUBLE SWALLOWING: 0
VOMITING: 0
EYE ITCHING: 0
SORE THROAT: 0
CONSTIPATION: 0
DIARRHEA: 0
SHORTNESS OF BREATH: 0

## 2020-01-23 ENCOUNTER — HOSPITAL ENCOUNTER (OUTPATIENT)
Age: 65
Discharge: HOME | End: 2020-01-23
Payer: COMMERCIAL

## 2020-01-23 LAB
T4 FREE: 1.32 NG/DL (ref 0.93–1.76)
TSH SERPL DL<=0.05 MIU/L-ACNC: 0.86 UIU/ML (ref 0.4–4.2)

## 2020-01-23 PROCEDURE — 36415 COLL VENOUS BLD VENIPUNCTURE: CPT

## 2020-01-23 PROCEDURE — 84443 ASSAY THYROID STIM HORMONE: CPT

## 2020-01-23 PROCEDURE — 84439 ASSAY OF FREE THYROXINE: CPT

## 2020-02-03 ENCOUNTER — OFFICE VISIT (OUTPATIENT)
Dept: INTERNAL MEDICINE CLINIC | Age: 65
End: 2020-02-03
Payer: COMMERCIAL

## 2020-02-03 VITALS
DIASTOLIC BLOOD PRESSURE: 74 MMHG | HEART RATE: 75 BPM | WEIGHT: 225.2 LBS | BODY MASS INDEX: 33.36 KG/M2 | SYSTOLIC BLOOD PRESSURE: 123 MMHG | HEIGHT: 69 IN

## 2020-02-03 LAB — HBA1C MFR BLD: 10 % (ref 4.3–5.7)

## 2020-02-03 PROCEDURE — 83036 HEMOGLOBIN GLYCOSYLATED A1C: CPT | Performed by: NURSE PRACTITIONER

## 2020-02-03 PROCEDURE — 99214 OFFICE O/P EST MOD 30 MIN: CPT | Performed by: NURSE PRACTITIONER

## 2020-02-03 ASSESSMENT — ENCOUNTER SYMPTOMS
DIARRHEA: 0
BLOOD IN STOOL: 0
VOMITING: 0
ABDOMINAL PAIN: 0
TROUBLE SWALLOWING: 0
PHOTOPHOBIA: 0
EYE ITCHING: 0
SORE THROAT: 0
COUGH: 0
RHINORRHEA: 0
CONSTIPATION: 0
SHORTNESS OF BREATH: 0
NAUSEA: 0

## 2020-02-03 ASSESSMENT — PATIENT HEALTH QUESTIONNAIRE - PHQ9
1. LITTLE INTEREST OR PLEASURE IN DOING THINGS: 0
SUM OF ALL RESPONSES TO PHQ9 QUESTIONS 1 & 2: 0
SUM OF ALL RESPONSES TO PHQ QUESTIONS 1-9: 0
2. FEELING DOWN, DEPRESSED OR HOPELESS: 0
SUM OF ALL RESPONSES TO PHQ QUESTIONS 1-9: 0

## 2020-02-03 NOTE — PROGRESS NOTES
Disp: , Rfl:     PARoxetine (PAXIL) 10 MG tablet, Take 10 mg by mouth every morning, Disp: , Rfl:     Valerian 500 MG CAPS, Take 2,000 mg by mouth nightly , Disp: , Rfl:     Bimatoprost (LUMIGAN OP), Place 1 drop into both eyes daily, Disp: , Rfl:     midodrine (PROAMATINE) 5 MG tablet, Take 5 mg by mouth 3 times daily , Disp: , Rfl:     glucagon 1 MG injection, Inject 1 mg into the skin See Admin Instructions Follow package directions for low blood sugar., Disp: 1 kit, Rfl: 3    Omega 3 1200 MG CAPS, Take 1,200 capsules by mouth daily, Disp: , Rfl:     brimonidine-timolol (COMBIGAN) 0.2-0.5 % ophthalmic solution, Place 1 drop into both eyes 3 times daily , Disp: , Rfl:     calcium carbonate (TUMS CALCIUM FOR LIFE BONE) 750 MG chewable tablet, Take 1 tablet by mouth daily. , Disp: , Rfl:     aspirin 81 MG tablet, Take 81 mg by mouth daily. , Disp: , Rfl:     therapeutic multivitamin-minerals (THERAGRAN-M) tablet, Take 1 tablet by mouth daily. , Disp: , Rfl:     Ascorbic Acid (VITAMIN C) 500 MG tablet, Take 1,000 mg by mouth daily. , Disp: , Rfl:     acetone, urine, test strip, Please test urine for ketones if you have nausea, vomiting and abdominal pain., Disp: 10 strip, Rfl: 1    The patient is allergic to milk-related compounds; statins; and pcn [penicillins]. Past Medical History  Oreillyoseas Walton  has a past medical history of Depression, Fibromyalgia, Glaucoma, Hyperthyroidism, Meningitis, Meningitis spinal, Osteoarthritis, and Type I (juvenile type) diabetes mellitus without mention of complication, not stated as uncontrolled. Past Surgical History  The patient  has a past surgical history that includes  section; Tonsillectomy; Hemorrhoid surgery; knee surgery (Left, 2013); Colonoscopy; Total Thyroidectomy (2013); Eye surgery (Left, 14); and Insertable Cardiac Monitor (Left, 2017).     Family History  This patient's family history includes Diabetes in her father; High flat.         Labs Reviewed 2/3/2020:    Lab Results   Component Value Date    WBC 6.3 10/14/2019    HGB 12.7 10/14/2019    HCT 40.8 10/14/2019     10/14/2019    CHOL 189 05/07/2019    TRIG 157 05/07/2019    HDL 63 05/07/2019    LDLDIRECT 174 (H) 12/27/2016    ALT 29 05/07/2019    AST 28 05/07/2019     10/14/2019    K 4.8 10/14/2019     10/14/2019    CREATININE 0.8 10/14/2019    BUN 13 10/14/2019    CO2 25 10/14/2019    TSH 0.859 01/23/2020    PSA <0.01 11/19/2014    LABA1C 10.0 (H) 02/03/2020    LABMICR < 1.20 08/10/2019       Assessment/Plan      1. DM (diabetes mellitus), type 1 with complications (HCC)  Y7C up to 10.0% today  A1C goal <7.0% provided no hypoglycemia  Will adjust insulin to get rid of lows and avoid overtreatment. Long discussion concerning 15/15 method, she understands and reports she will try to do this. Check glucose fasting in am, before meals and at bedtime to adjust insulins. Lantus 18 units am, 12 units pm.   Humalog 5 units plus group 1 scale with meals. GROUP 1    Blood Sugar Dose fast acting insulin    None   140-199 1 unit   200-249 2 units   250-299 3 units   300-349 4 units   350-399 5 units   400 and above 6 units     Meter download in 2 weeks for adjustment. Any problems with increased highs, or lows download sooner. If unable to achieve better control, would like pt to see endocrinology. Yearly dilated eye exams  Regular foot exams (Foot exam next visit)    - POCT glycosylated hemoglobin (Hb A1C)    2. Hypothyroidism, acquired, s/p total thyroidectomy for non toxic MNG  Continue current dose of Synthroid due to recent TSH 0.859 (1/23)    3. HTN  With hypotension, now on midodrine and controlled    4. HLD   Last lipids 5/7/19  LDL was 95, cardiology follows. Return in about 6 weeks (around 3/16/2020). Patient given educational materials - see patient instructions.   Discussed use, benefit, and side effects of prescribed

## 2020-02-19 ENCOUNTER — TELEPHONE (OUTPATIENT)
Dept: INTERNAL MEDICINE CLINIC | Age: 65
End: 2020-02-19

## 2020-02-19 NOTE — TELEPHONE ENCOUNTER
Reviewed glucose logs, having some lows, and highs, unable to adjust insulins based on this data. Would she like to see an endocrinologist to help manage her type 1?

## 2020-05-14 ENCOUNTER — VIRTUAL VISIT (OUTPATIENT)
Dept: INTERNAL MEDICINE CLINIC | Age: 65
End: 2020-05-14
Payer: COMMERCIAL

## 2020-05-14 PROCEDURE — 99214 OFFICE O/P EST MOD 30 MIN: CPT | Performed by: NURSE PRACTITIONER

## 2020-05-14 ASSESSMENT — ENCOUNTER SYMPTOMS
COUGH: 0
VOMITING: 0
SHORTNESS OF BREATH: 0
NAUSEA: 0
DIARRHEA: 0
ABDOMINAL PAIN: 0

## 2020-05-19 RX ORDER — GLUCOSAMINE HCL/CHONDROITIN SU 500-400 MG
CAPSULE ORAL
Qty: 100 STRIP | Refills: 5 | Status: SHIPPED | OUTPATIENT
Start: 2020-05-19 | End: 2021-08-10

## 2020-07-06 ENCOUNTER — HOSPITAL ENCOUNTER (OUTPATIENT)
Age: 65
Discharge: HOME | End: 2020-07-06
Payer: COMMERCIAL

## 2020-07-06 LAB
ALBUMIN SERPL-MCNC: 4.2 G/DL (ref 3.5–5.1)
ALP BLD-CCNC: 110 U/L (ref 38–126)
ALT SERPL-CCNC: 35 U/L (ref 11–66)
ANION GAP SERPL CALCULATED.3IONS-SCNC: 12 MEQ/L (ref 8–16)
AST SERPL-CCNC: 28 U/L (ref 5–40)
BILIRUB SERPL-MCNC: 0.3 MG/DL (ref 0.3–1.2)
BUN BLDV-MCNC: 14 MG/DL (ref 7–22)
CALCIUM SERPL-MCNC: 9.2 MG/DL (ref 8.5–10.5)
CHLORIDE BLD-SCNC: 101 MEQ/L (ref 98–111)
CHOLESTEROL, FASTING: 183 MG/DL (ref 100–199)
CO2: 27 MEQ/L (ref 23–33)
CREAT SERPL-MCNC: 0.7 MG/DL (ref 0.4–1.2)
CREATININE, URINE: 107.3 MG/DL
GFR SERPL CREATININE-BSD FRML MDRD: 84 ML/MIN/1.73M2
GLUCOSE FASTING: 230 MG/DL (ref 70–108)
HDLC SERPL-MCNC: 54 MG/DL
LDL CHOLESTEROL CALCULATED: 78 MG/DL
MICROALBUMIN UR-MCNC: < 1.2 MG/DL
MICROALBUMIN/CREAT UR-RTO: 11 MG/G (ref 0–30)
POTASSIUM SERPL-SCNC: 4.8 MEQ/L (ref 3.5–5.2)
SODIUM BLD-SCNC: 140 MEQ/L (ref 135–145)
TOTAL PROTEIN: 7.3 G/DL (ref 6.1–8)
TRIGLYCERIDE, FASTING: 256 MG/DL (ref 0–199)
TSH SERPL DL<=0.05 MIU/L-ACNC: 1.86 UIU/ML (ref 0.4–4.2)

## 2020-07-06 PROCEDURE — 80053 COMPREHEN METABOLIC PANEL: CPT

## 2020-07-06 PROCEDURE — 82043 UR ALBUMIN QUANTITATIVE: CPT

## 2020-07-06 PROCEDURE — 84443 ASSAY THYROID STIM HORMONE: CPT

## 2020-07-06 PROCEDURE — 80061 LIPID PANEL: CPT

## 2020-07-06 PROCEDURE — 36415 COLL VENOUS BLD VENIPUNCTURE: CPT

## 2020-07-08 ENCOUNTER — TELEPHONE (OUTPATIENT)
Dept: INTERNAL MEDICINE CLINIC | Age: 65
End: 2020-07-08

## 2020-07-13 ENCOUNTER — OFFICE VISIT (OUTPATIENT)
Dept: INTERNAL MEDICINE CLINIC | Age: 65
End: 2020-07-13
Payer: COMMERCIAL

## 2020-07-13 VITALS
BODY MASS INDEX: 33.85 KG/M2 | TEMPERATURE: 97.1 F | DIASTOLIC BLOOD PRESSURE: 64 MMHG | HEART RATE: 95 BPM | WEIGHT: 228.56 LBS | HEIGHT: 69 IN | SYSTOLIC BLOOD PRESSURE: 131 MMHG

## 2020-07-13 LAB — HBA1C MFR BLD: 9.1 % (ref 4.3–5.7)

## 2020-07-13 PROCEDURE — 83036 HEMOGLOBIN GLYCOSYLATED A1C: CPT | Performed by: NURSE PRACTITIONER

## 2020-07-13 PROCEDURE — 99214 OFFICE O/P EST MOD 30 MIN: CPT | Performed by: NURSE PRACTITIONER

## 2020-07-13 NOTE — PATIENT INSTRUCTIONS
Reduce Lantus 16 am, 6 pm.   Continue Humalog 5 units plus scale. Glucose results in two weeks. Call if continue lows, will adjust further.

## 2020-07-13 NOTE — PROGRESS NOTES
[facility]  [address] [facility] [address]  Dept: [phone]  Dept Fax: [phone]  Loc: [phone]     Visit Date:  7/13/2020    Patient:  Magdiel Morataya  YOB: 1955    HPI:     Chief Complaint   Patient presents with    Diabetes    Hypothyroidism       Here to follow up DM   DM 1 - A1C 9.1%. Down from 10.0% in January. Drinking muscle milk for breakfast, glucerna caused her glucose to spike. Has been having more lows. Has been working on not overtreating lows. Has glucagon at home. Eating better. Watching her carb intake. Fasting - . Noon . Dinner . Had a high of 441, and states she forgot her insulin. Had a 48 7/4 evening, states she was very active and didn't get a chance to eat. Lantus 18 am, 10 pm.   Humalog 5 units plus scale. MACR 11 7/6/2020. Feeling less foggy. She has been sleeping better with a tylenol PM and valerian, or half tylenol PM and sleepytime tea. Also no longer getting up at 0530. Not watching the grandkids anymore due to schedule change, parents still living with them. Hypothyroidism - TSH 1.860. On Synthroid 137 mcg. Hyperlipidemia - On Zetia 10 mg daily. Results for Aissatou Brunner (MRN [id number]) as of 7/13/2020 15:24   Ref.  Range 7/6/2020 09:04   Cholesterol, Fasting Latest Ref Range: 100 - 199 mg/dl 183   HDL Cholesterol Latest Units: mg/dL 54   LDL Calculated Latest Units: mg/dL 78   Triglyceride, Fasting Latest Ref Range: 0 - 199 mg/dl 256 (H)       Medications    Current Outpatient Medications:     Cholecalciferol (VITAMIN D3) 25 MCG (1000 UT) CAPS, Take by mouth daily, Disp: , Rfl:     SYNTHROID 137 MCG tablet, TAKE 1 TABLET DAILY WITH AN EXTRA ONE-HALF (1/2) TABLET ONE DAY PER WEEK, Disp: 96 tablet, Rfl: 3    blood glucose monitor strips, Test blood sugars three times a day, Disp: 100 strip, Rfl: 5    Nutritional Supplements (ESTROVEN PO), Take by mouth daily, Disp: , Rfl:     insulin glargine (LANTUS SOLOSTAR) 100 UNIT/ML injection pen, INJECT 18 units in AM and 10 units in PM, Disp: 30 mL, Rfl: 1    insulin lispro (HUMALOG KWIKPEN) 100 UNIT/ML pen, INJECT 8 UNITS THREE TIMES A DAY BEFORE MEALS, PLUS SLIDING SCALE, MAX DOSE 48 UNITS PER DAY. (Patient taking differently: INJECT 5 UNITS THREE TIMES A DAY BEFORE MEALS, PLUS SLIDING SCALE, MAX DOSE 48 UNITS PER DAY.), Disp: 45 mL, Rfl: 3    ezetimibe (ZETIA) 10 MG tablet, Take 10 mg by mouth daily, Disp: , Rfl:     vitamin E 400 UNIT capsule, Take 400 Units by mouth daily, Disp: , Rfl:     Multiple Vitamins-Minerals (HAIR SKIN AND NAILS FORMULA PO), Take 1 tablet by mouth daily, Disp: , Rfl:     PARoxetine (PAXIL) 10 MG tablet, Take 10 mg by mouth every morning, Disp: , Rfl:     Bimatoprost (LUMIGAN OP), Place 1 drop into both eyes daily, Disp: , Rfl:     glucagon 1 MG injection, Inject 1 mg into the skin See Admin Instructions Follow package directions for low blood sugar., Disp: 1 kit, Rfl: 3    Omega 3 1200 MG CAPS, Take 1,200 capsules by mouth daily, Disp: , Rfl:     brimonidine-timolol (COMBIGAN) 0.2-0.5 % ophthalmic solution, Place 1 drop into both eyes 3 times daily , Disp: , Rfl:     calcium carbonate (TUMS CALCIUM FOR LIFE BONE) 750 MG chewable tablet, Take 1 tablet by mouth daily. , Disp: , Rfl:     aspirin 81 MG tablet, Take 81 mg by mouth daily. , Disp: , Rfl:     therapeutic multivitamin-minerals (THERAGRAN-M) tablet, Take 1 tablet by mouth daily. , Disp: , Rfl:     Ascorbic Acid (VITAMIN C) 500 MG tablet, Take 1,000 mg by mouth daily. , Disp: , Rfl:     acetone, urine, test strip, Please test urine for ketones if you have nausea, vomiting and abdominal pain., Disp: 10 strip, Rfl: 1    The patient is allergic to milk-related compounds; statins; and pcn [penicillins].     Past Medical History  Juanita Aragon  has a past medical history of Depression, Fibromyalgia, Glaucoma, Hyperthyroidism, Meningitis, Meningitis spinal, Osteoarthritis, and Type I (juvenile type) diabetes mellitus without mention of complication, not stated as uncontrolled. Past Surgical History  The patient  has a past surgical history that includes  section; Tonsillectomy; Hemorrhoid surgery; knee surgery (Left, 2013); Colonoscopy; Total Thyroidectomy (2013); Eye surgery (Left, 14); and Insertable Cardiac Monitor (Left, 2017). Family History  This patient's family history includes Diabetes in her father; High Cholesterol in her mother; Thyroid Disease in her mother. Social History  Caleb Mistry  reports that she has never smoked. She has never used smokeless tobacco. She reports that she does not drink alcohol or use drugs. Health Maintenance:    Health Maintenance   Topic Date Due    Hepatitis C screen  1955    Pneumococcal 0-64 years Vaccine (1 of 1 - PPSV23) 1963    HIV screen  1972    DTaP/Tdap/Td vaccine (1 - Tdap) 1976    Cervical cancer screen  1978    Breast cancer screen  2007    Shingles Vaccine (1 of 2) 2007    Colon cancer screen colonoscopy  2007    Flu vaccine (1) 2020    A1C test (Diabetic or Prediabetic)  10/13/2020    Diabetic retinal exam  2020    Diabetic microalbuminuria test  2021    Lipid screen  2021    TSH testing  2021    Potassium monitoring  2021    Creatinine monitoring  2021    Diabetic foot exam  2021    Hepatitis A vaccine  Aged Out    Hib vaccine  Aged Out    Meningococcal (ACWY) vaccine  Aged Out       Subjective:      Review of Systems   Constitutional: Negative for chills, fatigue and fever. HENT: Negative for sore throat and trouble swallowing. Eyes: Negative for itching and visual disturbance. Respiratory: Negative for cough, choking and shortness of breath.     Cardiovascular: Negative for chest pain and leg swelling. Gastrointestinal: Negative for abdominal pain, constipation, diarrhea, nausea and vomiting. Endocrine: Negative for cold intolerance and heat intolerance. Genitourinary: Negative for difficulty urinating and dysuria. Musculoskeletal: Negative for arthralgias and myalgias. Skin: Negative for rash and wound. Neurological: Negative for dizziness, tremors, weakness, light-headedness, numbness and headaches. Psychiatric/Behavioral: Negative for agitation, dysphoric mood, sleep disturbance and suicidal ideas. The patient is not nervous/anxious. Objective:     /64 (Site: Right Upper Arm, Cuff Size: Large Adult)   Pulse 95   Temp 97.1 °F (36.2 °C) (Temporal)   Ht 5' 9\" (1.753 m)   Wt 228 lb 9 oz (103.7 kg)   LMP 01/21/2013   BMI 33.75 kg/m²     Physical Exam  Vitals signs reviewed. Constitutional:       Appearance: She is well-developed. HENT:      Head: Normocephalic and atraumatic. Eyes:      General: No scleral icterus. Right eye: No discharge. Left eye: No discharge. Pupils: Pupils are equal, round, and reactive to light. Neck:      Musculoskeletal: Normal range of motion and neck supple. Thyroid: No thyromegaly. Cardiovascular:      Rate and Rhythm: Normal rate and regular rhythm. Heart sounds: Normal heart sounds. No murmur. No friction rub. No gallop. Pulmonary:      Effort: Pulmonary effort is normal. No respiratory distress. Breath sounds: Normal breath sounds. No wheezing or rales. Abdominal:      General: Bowel sounds are normal. There is no distension. Palpations: Abdomen is soft. Tenderness: There is no abdominal tenderness. Musculoskeletal: Normal range of motion. General: No tenderness or deformity. Lymphadenopathy:      Cervical: No cervical adenopathy. Skin:     General: Skin is warm and dry. Coloration: Skin is not pale. Findings: No erythema.    Neurological:      Mental Status: She is alert and oriented to person, place, and time. Cranial Nerves: No cranial nerve deficit. Labs Reviewed 7/13/2020:    Lab Results   Component Value Date    WBC 6.3 10/14/2019    HGB 12.7 10/14/2019    HCT 40.8 10/14/2019     10/14/2019    CHOL 189 05/07/2019    TRIG 157 05/07/2019    HDL 54 07/06/2020    LDLDIRECT 174 (H) 12/27/2016    ALT 35 07/06/2020    AST 28 07/06/2020     07/06/2020    K 4.8 07/06/2020     07/06/2020    CREATININE 0.7 07/06/2020    BUN 14 07/06/2020    CO2 27 07/06/2020    TSH 1.860 07/06/2020    PSA <0.01 11/19/2014    GLUF 230 (H) 07/06/2020    LABA1C 9.1 (H) 07/13/2020    LABMICR < 1.20 07/06/2020     DIABETIC FOOT EXAM  Visual inspection:  Deformity/amputation: absent  Skin lesions/pre-ulcerative calluses: absent  Edema: right- negative, left- negative    Sensory exam:  Monofilament sensation: normal  (minimum of 5 random plantar locations tested, avoiding callused areas - > 1 area with absence of sensation is + for neuropathy)    Plus at least one of the following:  Pulses: normal,   Pinprick: N/A  Proprioception: N/A  Vibration (128 Hz): N/A    10/25/18 Thyroid US    PROCEDURE: US THYROID         CLINICAL INFORMATION: Total thyroidectomy 2013; history of multinodular goiter; technologist notes state had loss and fatigue.         COMPARISON: No prior study.         TECHNIQUE: Grayscale and color sonographic imaging of the thyroid bed performed in longitudinal and transverse planes.              FINDINGS:    POSTOPERATIVE CHANGES:    1. Total thyroidectomy.         RIGHT THYROID BED:    1. There is no residual thyroid tissue or mass within the right thyroid bed.         LEFT THYROID BED:    1. There is no residual thyroid tissue or mass within the left thyroid bed.         CERVICAL LYMPHADENOPATHY:    1. There is a 1.0 x 1.7 x 0.3 cm right cervical lymph node with a cortical rim measuring 0.2 cm. There is retained hilar fat and hilar vascularity.     2. There is a 1.1 x 1.8 x 0.5 cm left cervical lymph node with a cortical rim measuring 0.4 cm. There is retained hilar fat and hilar vascularity.                   Impression    1. Total thyroidectomy. 2. There is no residual thyroid tissue or mass within either thyroid bed. 3. There is a cervical lymph node bilaterally with retained hilar fat and hilar vascularity. These lymph nodes are nonspecific. A follow-up ultrasound examination of the neck in 3 months is recommended to confirm stability. 1/22/19  PROCEDURE: US HEAD NECK SOFT TISSUE THYROID         CLINICAL INFORMATION: Nontoxic multinodular goiter, Lymphadenopathy, cervical.         COMPARISON: Ultrasound of the thyroid dated October 25, 2018.         TECHNIQUE: Ultrasound images of the thyroid gland were obtained with a linear transducer.         FINDINGS:         Prominent lymph nodes are again noted within the neck. A representative lymph node within the right side of the neck measures 2.3 x 0.6 x 0.5 cm and demonstrates a reniform shape and fatty hilum.         A lymph node within the medial left neck measures 1.9 x 0.8 x 0.7 cm and demonstrates a reniform shape. It appears slightly more prominent compared to the prior exam. Additional prominent lymph nodes are noted within the left side of the neck.              Impression         There is redemonstration of prominent lymph nodes within the neck. These do not demonstrate abnormal morphology and appears similar to the prior exam. These are most likely reactive however, clinical correlation with laboratory values to rule out    underlying systemic disease would be beneficial.      2/11/19 FNA  SOURCE:   A) FINE NEEDLE ASPIRATE THYROID, RIGHT NECK     Source Description:                 Materials Prepared & Examined:   Volume. ........... Ricka Distad 1 ml            Smear Slides. .............. 2   Consistency. .. Ricka Distad Ricka Distad Ricka Distad Thin              Monolayers. ............... Ricka Distad  1         RAPID ONSITE EVALUATION:   FNA 1-lymphoid sample.  SIO       FINAL RESULTS:  Benign lymphoid sample. Assessment/Plan      1. Type 1 diabetes mellitus without complication (HCC)  P9I goal <7.0% provided no hypoglycemia. A1C today improving at 9.0%, however having lows. Has glucagon at home. Discussed adjustments to prevent lows, will then address highs. Continue attention to diet, exercise, increased water intake. Reduce Lantus 16 am, 6 pm.   Continue Humalog 5 units plus scale. Glucose results in two weeks. Call if continue lows, will adjust further.  - POCT glycosylated hemoglobin (Hb A1C)  -  DIABETES FOOT EXAM    2. Acquired hypothyroidism  TSH 1.860, on Synthroid 137 mcg daily, reports proper administration. 3. Nontoxic multinodular goiter    4. S/P total thyroidectomy  Due for US, last thyroid dedicated US without thyroid tissue growth 10/25/18. Did have lymph nodes on US 1/22/19, biopsied and benign    5. Hyperlipidemia, unspecified hyperlipidemia type  Stable, continue Zetia 10 mg daily. LDL 78, HDL 54.  but DM uncontrolled. 6. Essential hypertension  /64, stable. Return in about 6 weeks (around 8/24/2020) for Diabetes. Patient given educational materials - see patient instructions. Discussed use, benefit, and side effects of prescribed medications. All patient questions answered. Pt voiced understanding.        Electronically signed GONSALO Sanchez CNP on 7/13/20 at 3:21 PM EDT

## 2020-07-26 ASSESSMENT — ENCOUNTER SYMPTOMS
CHOKING: 0
DIARRHEA: 0
SHORTNESS OF BREATH: 0
NAUSEA: 0
SORE THROAT: 0
COUGH: 0
ABDOMINAL PAIN: 0
VOMITING: 0
TROUBLE SWALLOWING: 0
CONSTIPATION: 0
EYE ITCHING: 0

## 2020-07-27 RX ORDER — INSULIN GLARGINE 100 [IU]/ML
INJECTION, SOLUTION SUBCUTANEOUS
Qty: 30 ML | Refills: 3 | Status: SHIPPED | OUTPATIENT
Start: 2020-07-27 | End: 2021-07-26 | Stop reason: SDUPTHER

## 2020-07-27 NOTE — TELEPHONE ENCOUNTER
Last visit- 7/13/2020  Next visit- 8/24/2020    Requested Prescriptions     Pending Prescriptions Disp Refills    insulin glargine (LANTUS SOLOSTAR) 100 UNIT/ML injection pen [Pharmacy Med Name: LANTUS SOLOSTAR PEN 100U/ML] 30 mL 3     Sig: INJECT 18 UNITS IN THE MORNING AND 10 UNITS IN THE EVENING

## 2020-08-24 ENCOUNTER — OFFICE VISIT (OUTPATIENT)
Dept: INTERNAL MEDICINE CLINIC | Age: 65
End: 2020-08-24
Payer: COMMERCIAL

## 2020-08-24 VITALS
HEART RATE: 86 BPM | SYSTOLIC BLOOD PRESSURE: 130 MMHG | HEIGHT: 69 IN | WEIGHT: 231 LBS | TEMPERATURE: 97.1 F | DIASTOLIC BLOOD PRESSURE: 67 MMHG | BODY MASS INDEX: 34.21 KG/M2

## 2020-08-24 PROCEDURE — 99214 OFFICE O/P EST MOD 30 MIN: CPT | Performed by: NURSE PRACTITIONER

## 2020-08-24 RX ORDER — LEVOTHYROXINE SODIUM 137 MCG
TABLET ORAL
Qty: 96 TABLET | Refills: 3 | Status: SHIPPED | OUTPATIENT
Start: 2020-08-24 | End: 2020-08-26 | Stop reason: SDUPTHER

## 2020-08-24 NOTE — PROGRESS NOTES
[facility]  [address] [facility] [address]  Dept: [phone]  Dept Fax: [phone] : [phone]     Visit Date:  8/24/2020    Patient:  German Garrett  YOB: 1955    HPI:     Chief Complaint   Patient presents with    Diabetes       Here for diabetes follow up. DM 1 - A1C 9.1% down from 10.0. Feels her sugars have been much improved compared to previous. She can pinpoint where highs are occurring. States she had a head cold and her sugars got out of control. States she didn't change her diet or anything else to affect this. States it has been better this past week. She was taking Nyquil. Fasting glucose , 142 today prior to coming at lunch after muscle milk in the am.   Noon 111-498, 142-210 generally, evening meal 210, 258, 484 only 3 readings. Taking Humalog 5 units plus 1:50 over 150. No longer on Perinodopril d/t low BP. Last MACR 11. /67. Off midodrine and Perinodopril also. HLD - on omega 3/6/9      Medications    Current Outpatient Medications:     insulin glargine (LANTUS SOLOSTAR) 100 UNIT/ML injection pen, INJECT 18 UNITS IN THE MORNING AND 10 UNITS IN THE EVENING (Patient taking differently: INJECT 16 UNITS IN THE MORNING AND 12 UNITS IN THE EVENING), Disp: 30 mL, Rfl: 3    Cholecalciferol (VITAMIN D3) 25 MCG (1000 UT) CAPS, Take by mouth daily, Disp: , Rfl:     blood glucose monitor strips, Test blood sugars three times a day, Disp: 100 strip, Rfl: 5    Nutritional Supplements (ESTROVEN PO), Take by mouth daily, Disp: , Rfl:     insulin lispro (HUMALOG KWIKPEN) 100 UNIT/ML pen, INJECT 8 UNITS THREE TIMES A DAY BEFORE MEALS, PLUS SLIDING SCALE, MAX DOSE 48 UNITS PER DAY.  (Patient taking differently: INJECT 5 UNITS THREE TIMES A DAY BEFORE MEALS, PLUS SLIDING SCALE, MAX DOSE 48 UNITS PER DAY.), Disp: 45 mL, Rfl: 3    ezetimibe (ZETIA) 10 MG tablet, Take 10 mg by mouth daily, Disp: , Rfl:     vitamin E 400 UNIT capsule, Take 400 Units by mouth daily, Disp: , Rfl:     Multiple Vitamins-Minerals (HAIR SKIN AND NAILS FORMULA PO), Take 1 tablet by mouth daily, Disp: , Rfl:     PARoxetine (PAXIL) 10 MG tablet, Take 10 mg by mouth every morning, Disp: , Rfl:     Bimatoprost (LUMIGAN OP), Place 1 drop into both eyes daily, Disp: , Rfl:     glucagon 1 MG injection, Inject 1 mg into the skin See Admin Instructions Follow package directions for low blood sugar., Disp: 1 kit, Rfl: 3    acetone, urine, test strip, Please test urine for ketones if you have nausea, vomiting and abdominal pain., Disp: 10 strip, Rfl: 1    Omega 3 1200 MG CAPS, Take 1,200 capsules by mouth daily, Disp: , Rfl:     brimonidine-timolol (COMBIGAN) 0.2-0.5 % ophthalmic solution, Place 1 drop into both eyes 3 times daily , Disp: , Rfl:     calcium carbonate (TUMS CALCIUM FOR LIFE BONE) 750 MG chewable tablet, Take 1 tablet by mouth daily. , Disp: , Rfl:     aspirin 81 MG tablet, Take 81 mg by mouth daily. , Disp: , Rfl:     therapeutic multivitamin-minerals (THERAGRAN-M) tablet, Take 1 tablet by mouth daily. , Disp: , Rfl:     Ascorbic Acid (VITAMIN C) 500 MG tablet, Take 1,000 mg by mouth daily. , Disp: , Rfl:     HUMALOG KWIKPEN 100 UNIT/ML SOPN, Inject 7 Units into the skin 3 times daily Plus sliding scale. Daily maximum dose 45 units, Disp: 15 pen, Rfl: 3    levothyroxine (SYNTHROID) 137 MCG tablet, Take 1 tablet by mouth daily, Disp: 96 tablet, Rfl: 1    SYNTHROID 137 MCG tablet, TAKE 1 TABLET DAILY WITH AN EXTRA ONE-HALF (1/2) TABLET ONE DAY PER WEEK, Disp: 96 tablet, Rfl: 3    The patient is allergic to milk-related compounds; statins; and pcn [penicillins].     Past Medical History  Tatiana Schmitz  has a past medical history of Depression, Fibromyalgia, Glaucoma, Hyperthyroidism, Meningitis, Meningitis spinal, Osteoarthritis, and Type I (juvenile type) diabetes mellitus without mention of complication, not stated as uncontrolled. Past Surgical History  The patient  has a past surgical history that includes  section; Tonsillectomy; Hemorrhoid surgery; knee surgery (Left, 2013); Colonoscopy; Total Thyroidectomy (2013); Eye surgery (Left, 14); and Insertable Cardiac Monitor (Left, 2017). Family History  This patient's family history includes Diabetes in her father; High Cholesterol in her mother; Thyroid Disease in her mother. Social History  Benjamin Saenz  reports that she has never smoked. She has never used smokeless tobacco. She reports that she does not drink alcohol or use drugs. Health Maintenance:    Health Maintenance   Topic Date Due    Hepatitis C screen  1955    Pneumococcal 0-64 years Vaccine (1 of 1 - PPSV23) 1963    HIV screen  1972    DTaP/Tdap/Td vaccine (1 - Tdap) 1976    Cervical cancer screen  1978    Breast cancer screen  2007    Shingles Vaccine (1 of 2) 2007    Colon cancer screen colonoscopy  2007    Flu vaccine (1) 2020    A1C test (Diabetic or Prediabetic)  10/13/2020    Diabetic retinal exam  2020    Diabetic microalbuminuria test  2021    Lipid screen  2021    TSH testing  2021    Potassium monitoring  2021    Creatinine monitoring  2021    Diabetic foot exam  2021    Hepatitis A vaccine  Aged Out    Hib vaccine  Aged Out    Meningococcal (ACWY) vaccine  Aged Out       Subjective:      Review of Systems   Constitutional: Negative for chills, fatigue and fever. Respiratory: Negative for cough and shortness of breath. Cardiovascular: Negative for chest pain and leg swelling. Gastrointestinal: Negative for abdominal pain, constipation, diarrhea, nausea and vomiting. Endocrine: Negative for polydipsia and polyphagia. Genitourinary: Negative for difficulty urinating and dysuria.    Musculoskeletal: Negative for arthralgias and myalgias. Skin: Negative for rash and wound. Neurological: Negative for numbness. Objective:     /67 (Site: Right Upper Arm, Position: Sitting, Cuff Size: Large Adult)   Pulse 86   Temp 97.1 °F (36.2 °C) (Temporal)   Ht 5' 9\" (1.753 m)   Wt 231 lb (104.8 kg)   LMP 01/21/2013   BMI 34.11 kg/m²     Physical Exam  Vitals signs reviewed. Constitutional:       General: She is not in acute distress. Appearance: She is well-developed. She is not diaphoretic. HENT:      Head: Normocephalic and atraumatic. Neck:      Thyroid: No thyromegaly. Cardiovascular:      Rate and Rhythm: Normal rate and regular rhythm. Heart sounds: Normal heart sounds. No murmur. No friction rub. No gallop. Pulmonary:      Effort: Pulmonary effort is normal. No respiratory distress. Breath sounds: Normal breath sounds. No wheezing or rales. Abdominal:      General: There is no distension. Palpations: Abdomen is soft. Tenderness: There is no abdominal tenderness. Musculoskeletal:         General: No tenderness or deformity. Skin:     General: Skin is warm and dry. Coloration: Skin is not pale. Findings: No erythema. Neurological:      Mental Status: She is alert and oriented to person, place, and time. Cranial Nerves: No cranial nerve deficit. Labs Reviewed 8/24/2020:    Lab Results   Component Value Date    WBC 6.3 10/14/2019    HGB 12.7 10/14/2019    HCT 40.8 10/14/2019     10/14/2019    CHOL 189 05/07/2019    TRIG 157 05/07/2019    HDL 54 07/06/2020    LDLDIRECT 174 (H) 12/27/2016    ALT 35 07/06/2020    AST 28 07/06/2020     07/06/2020    K 4.8 07/06/2020     07/06/2020    CREATININE 0.7 07/06/2020    BUN 14 07/06/2020    CO2 27 07/06/2020    TSH 1.860 07/06/2020    PSA <0.01 11/19/2014    GLUF 230 (H) 07/06/2020    LABA1C 9.1 (H) 07/13/2020    LABMICR < 1.20 07/06/2020       Assessment/Plan      1.  Type 1 diabetes mellitus with hyperglycemia (HCC)  Continue current Lantus 16 units in am, 12 units pm.   Increase Humalog to 7 units plus scale. Call for any significant lows. Otherwise meter download in two weeks. Yearly dilated eye exams  Regular foot exams  Diabetic foot care is important in order to avoid foot wounds:    Never go barefoot even at home. Test your bathwater temperature before you step in. Nails should be trimmed to the shape of the toe. Wash and check your feet for new skin changes every day. Socks should fit snugly, not be tight and be changed every day. Shoes should fit snugly, neither tight nor loose. If deformities or wounds you may need special fitted shoes. 2. Acquired hypothyroidism  TSH 1.860 7/6/2020  Contiue current Synthroid  Denies thyroid s/s including dysphagia, dysphonia, brittle hair/nails, gritty dry eyes, fatigue, hot/cold intolerance.   - SYNTHROID 137 MCG tablet; TAKE 1 TABLET DAILY WITH AN EXTRA ONE-HALF (1/2) TABLET ONE DAY PER WEEK  Dispense: 96 tablet; Refill: 3    3. Hyperlipidemia, unspecified hyperlipidemia type  LDL 78, , LDL 54, . COntinue Zetia 10 mg daily. Return in about 2 months (around 10/24/2020) for Diabetes. Patient given educational materials - see patient instructions. Discussed use, benefit, and side effects of prescribed medications. All patient questions answered. Pt voiced understanding.       Electronically signed GONSALO Pedroza CNP on 8/24/20 at 1:27 PM EDT

## 2020-08-24 NOTE — PATIENT INSTRUCTIONS
Continue current Lantus 16 units in am, 12 units pm.   Increase Humalog to 7 units plus scale. Call for any significant lows. Otherwise meter download in two weeks.

## 2020-08-26 RX ORDER — LEVOTHYROXINE SODIUM 137 MCG
TABLET ORAL
Qty: 96 TABLET | Refills: 3 | Status: SHIPPED | OUTPATIENT
Start: 2020-08-26 | End: 2020-12-10 | Stop reason: SDUPTHER

## 2020-08-27 RX ORDER — INSULIN LISPRO 100 [IU]/ML
7 INJECTION, SOLUTION INTRAVENOUS; SUBCUTANEOUS 3 TIMES DAILY
Qty: 15 PEN | Refills: 3 | Status: SHIPPED | OUTPATIENT
Start: 2020-08-27 | End: 2021-03-24

## 2020-08-27 RX ORDER — LEVOTHYROXINE SODIUM 137 UG/1
137 TABLET ORAL DAILY
Qty: 96 TABLET | Refills: 1 | Status: SHIPPED | OUTPATIENT
Start: 2020-08-27 | End: 2020-12-09 | Stop reason: SDUPTHER

## 2020-08-31 ASSESSMENT — ENCOUNTER SYMPTOMS
VOMITING: 0
NAUSEA: 0
CONSTIPATION: 0
DIARRHEA: 0
COUGH: 0
ABDOMINAL PAIN: 0
SHORTNESS OF BREATH: 0

## 2020-10-26 ENCOUNTER — OFFICE VISIT (OUTPATIENT)
Dept: INTERNAL MEDICINE CLINIC | Age: 65
End: 2020-10-26
Payer: COMMERCIAL

## 2020-10-26 VITALS
HEIGHT: 69 IN | BODY MASS INDEX: 34.8 KG/M2 | SYSTOLIC BLOOD PRESSURE: 135 MMHG | WEIGHT: 235 LBS | HEART RATE: 80 BPM | TEMPERATURE: 98.3 F | DIASTOLIC BLOOD PRESSURE: 71 MMHG

## 2020-10-26 LAB — HBA1C MFR BLD: 8.7 % (ref 4.3–5.7)

## 2020-10-26 PROCEDURE — 3052F HG A1C>EQUAL 8.0%<EQUAL 9.0%: CPT | Performed by: NURSE PRACTITIONER

## 2020-10-26 PROCEDURE — 90472 IMMUNIZATION ADMIN EACH ADD: CPT | Performed by: NURSE PRACTITIONER

## 2020-10-26 PROCEDURE — 90732 PPSV23 VACC 2 YRS+ SUBQ/IM: CPT | Performed by: NURSE PRACTITIONER

## 2020-10-26 PROCEDURE — 99214 OFFICE O/P EST MOD 30 MIN: CPT | Performed by: NURSE PRACTITIONER

## 2020-10-26 PROCEDURE — 83036 HEMOGLOBIN GLYCOSYLATED A1C: CPT | Performed by: NURSE PRACTITIONER

## 2020-10-26 PROCEDURE — 90471 IMMUNIZATION ADMIN: CPT | Performed by: NURSE PRACTITIONER

## 2020-10-26 PROCEDURE — 90715 TDAP VACCINE 7 YRS/> IM: CPT | Performed by: NURSE PRACTITIONER

## 2020-10-26 RX ORDER — MIDODRINE HYDROCHLORIDE 5 MG/1
5 TABLET ORAL 3 TIMES DAILY
COMMUNITY

## 2020-10-26 ASSESSMENT — ENCOUNTER SYMPTOMS
VOMITING: 0
CONSTIPATION: 0
SHORTNESS OF BREATH: 0
NAUSEA: 0
COUGH: 0
ABDOMINAL PAIN: 0
DIARRHEA: 0

## 2020-10-26 NOTE — PROGRESS NOTES
Osteoarthritis, and Type I (juvenile type) diabetes mellitus without mention of complication, not stated as uncontrolled. Past Surgical History  The patient  has a past surgical history that includes  section; Tonsillectomy; Hemorrhoid surgery; knee surgery (Left, 2013); Colonoscopy; Total Thyroidectomy (2013); Eye surgery (Left, 14); and Insertable Cardiac Monitor (Left, 2017). Family History  This patient's family history includes Diabetes in her father; High Cholesterol in her mother; Thyroid Disease in her mother. Social History  Randall Dalton  reports that she has never smoked. She has never used smokeless tobacco. She reports that she does not drink alcohol or use drugs. Health Maintenance:    Health Maintenance   Topic Date Due    Breast cancer screen  2007    Colon cancer screen colonoscopy  2007    Cervical cancer screen  10/26/2021 (Originally 1978)    Flu vaccine (1) 10/26/2021 (Originally 2020)    Shingles Vaccine (1 of 2) 10/26/2021 (Originally 2007)    Diabetic retinal exam  2020    Diabetic microalbuminuria test  2021    Lipid screen  2021    TSH testing  2021    Potassium monitoring  2021    Creatinine monitoring  2021    Diabetic foot exam  2021    A1C test (Diabetic or Prediabetic)  10/26/2021    DTaP/Tdap/Td vaccine (2 - Td) 10/26/2030    Pneumococcal 0-64 years Vaccine  Completed    Hepatitis A vaccine  Aged Out    Hib vaccine  Aged Out    Meningococcal (ACWY) vaccine  Aged Out    Hepatitis C screen  Discontinued    HIV screen  Discontinued       Subjective:      Review of Systems   Constitutional: Negative for chills, fatigue and fever. Respiratory: Negative for cough and shortness of breath. Cardiovascular: Negative for chest pain and leg swelling. Gastrointestinal: Negative for abdominal pain, constipation, diarrhea, nausea and vomiting.    Endocrine: Negative for polydipsia and polyphagia. Genitourinary: Negative for difficulty urinating and dysuria. Musculoskeletal: Negative for arthralgias and myalgias. Skin: Negative for rash and wound. Neurological: Negative for numbness. Objective:     /71 (Site: Right Upper Arm, Position: Sitting, Cuff Size: Large Adult)   Pulse 80   Temp 98.3 °F (36.8 °C) (Temporal)   Ht 5' 9\" (1.753 m)   Wt 235 lb (106.6 kg)   LMP 01/21/2013   BMI 34.70 kg/m²     Physical Exam  Vitals signs reviewed. Constitutional:       General: She is not in acute distress. Appearance: She is well-developed. She is not diaphoretic. HENT:      Head: Normocephalic and atraumatic. Neck:      Thyroid: No thyromegaly. Cardiovascular:      Rate and Rhythm: Normal rate and regular rhythm. Heart sounds: Normal heart sounds. No murmur. No friction rub. No gallop. Pulmonary:      Effort: Pulmonary effort is normal. No respiratory distress. Breath sounds: Normal breath sounds. No wheezing or rales. Abdominal:      General: There is no distension. Palpations: Abdomen is soft. Tenderness: There is no abdominal tenderness. Musculoskeletal:         General: No tenderness or deformity. Skin:     General: Skin is warm and dry. Coloration: Skin is not pale. Findings: No erythema. Neurological:      Mental Status: She is alert and oriented to person, place, and time. Cranial Nerves: No cranial nerve deficit.          Labs Reviewed 10/26/2020:    Lab Results   Component Value Date    WBC 6.3 10/14/2019    HGB 12.7 10/14/2019    HCT 40.8 10/14/2019     10/14/2019    CHOL 189 05/07/2019    TRIG 157 05/07/2019    HDL 54 07/06/2020    LDLDIRECT 174 (H) 12/27/2016    ALT 35 07/06/2020    AST 28 07/06/2020     07/06/2020    K 4.8 07/06/2020     07/06/2020    CREATININE 0.7 07/06/2020    BUN 14 07/06/2020    CO2 27 07/06/2020    TSH 1.860 07/06/2020    PSA <0.01 11/19/2014    GLUF 230 (H) 07/06/2020    LABA1C 8.7 (H) 10/26/2020    LABMICR < 1.20 07/06/2020       Assessment/Plan      1. Type 1 diabetes mellitus with hyperglycemia ([facility] [address]  Recheck labs. More attention to diet, exercise, and water intake. Lantus 18 units in am, 14 units pm.   Continue Humalog dosing. More frequent glucose checks to include more scale insulin. Meter download in two weeks. If increased lows, or uncontrolled highs, call sooner.   - POCT glycosylated hemoglobin (Hb A1C)  - 40540 - Collection Capillary Blood Specimen  - Comprehensive Metabolic Panel, Fasting; Future  - Microalbumin / Creatinine Urine Ratio; Future    2. Mixed hyperlipidemia  Due for recheck labs. On Zetia 10 mg daily.      - LDL Cholesterol, Direct; Future    3. Urine test positive for microalbuminuria      4. Postoperative hypothyroidism  Due for recheck TFT's. On Synthroid 137 mcg daily, continue, stable thyroid S/S.   - TSH With Reflex Ft4; Future    5. Nontoxic multinodular goiter  - TSH With Reflex Ft4; Future    6. Status post total thyroidectomy  - TSH With Reflex Ft4; Future    7. Colon cancer screening  - Fecal Blood Immunochemical Test; Future    8. Need for pneumococcal vaccination  - [place] ADMIN PNEUMOCOCCAL VACCINE  - PNEUMOVAX 23 subcutaneous/IM (Pneumococcal polysaccharide vaccine 23-valent >= 1yo)    9. Need for Tdap vaccination  - Tdap (age 6y and older) IM ([address])      Return in about 3 months (around 1/26/2021). Patient given educational materials - see patient instructions. Discussed use, benefit, and side effects of prescribed medications. All patient questions answered.   Pt voiced understanding     Electronically signed GONSALO Escoto - CNP on 10/26/20 at 1:25 PM EDT

## 2020-10-26 NOTE — PROGRESS NOTES
After obtaining consent, and per orders of Shana Camp CNP, injection of TDAP given in Left deltoid and Pneumococcal 23 given in Right deltoid by [place]. Patient instructed to remain in clinic for 20 minutes afterwards, and to report any adverse reaction to me immediately.

## 2020-10-26 NOTE — PATIENT INSTRUCTIONS
Recheck labs. More attention to diet, exercise, and water intake. Lantus 18 units in am, 14 units pm.   Continue Humalog dosing. More frequent glucose checks to include more scale insulin. Meter download in two weeks. If increased lows, or uncontrolled highs, call sooner. Patient Education        Fecal Immunochemical Test (FIT): About This Test  What is it? A fecal immunochemical test, or FIT, checks for hidden blood in the stool. Your doctor gives you a kit that contains everything you need. At home, you follow simple steps to collect a small amount of stool. You return the kit to the doctor or to a lab. Why is this test done? This test is done to check for colorectal cancer. How is the test done? There are different types of home tests available. It's important to follow the instructions provided with any test.  Don't do the test during your menstrual period or if you are having bleeding from hemorrhoids. Here are some general instructions:  · Check the expiration date on the package. Don't use a test kit after its expiration date. · Follow the instructions exactly. Do all the steps in order, without skipping any of them. · After you finish your test, follow the instructions that you were given for returning the test.  For a FIT test, you put a small sample of stool in a tube or on a card that comes with the kit. You return this to your doctor or a lab. There is a FIT test that shows the results right away. If your test shows that blood was found in your stool sample, call your doctor as soon as you can. Follow-up care is a key part of your treatment and safety. Be sure to make and go to all appointments, and call your doctor if you are having problems. It's also a good idea to keep a list of the medicines you take. Ask your doctor when you can expect to have your test results. Where can you learn more? Go to [email or web address]://[email or web address].[employer]. org and sign in to your MyChart account. Enter R615 in the [facility] box to learn more about \"Fecal Immunochemical Test (FIT): About This Test.\"     If you do not have an account, please click on the \"Sign Up Now\" link. Current as of: April 29, 2020               Content Version: 12.6  © 6527-3603 [employer]. Care instructions adapted under license by [facility] ([facility]). If you have questions about a medical condition or this instruction, always ask your healthcare professional. [address] any warranty or liability for your use of this information.

## 2020-12-09 RX ORDER — LEVOTHYROXINE SODIUM 137 UG/1
137 TABLET ORAL DAILY
Qty: 15 TABLET | Refills: 0 | Status: SHIPPED | OUTPATIENT
Start: 2020-12-09 | End: 2021-02-28

## 2020-12-10 RX ORDER — LEVOTHYROXINE SODIUM 137 MCG
TABLET ORAL
Qty: 96 TABLET | Refills: 3 | Status: SHIPPED | OUTPATIENT
Start: 2020-12-10 | End: 2021-02-28 | Stop reason: SDUPTHER

## 2021-01-28 ENCOUNTER — HOSPITAL ENCOUNTER (OUTPATIENT)
Age: 66
Discharge: HOME | End: 2021-01-28
Payer: COMMERCIAL

## 2021-01-28 DIAGNOSIS — E78.2 MIXED HYPERLIPIDEMIA: ICD-10-CM

## 2021-01-28 DIAGNOSIS — E04.2 NONTOXIC MULTINODULAR GOITER: ICD-10-CM

## 2021-01-28 DIAGNOSIS — E89.0 POSTOPERATIVE HYPOTHYROIDISM: ICD-10-CM

## 2021-01-28 DIAGNOSIS — E10.65 TYPE 1 DIABETES MELLITUS WITH HYPERGLYCEMIA (HCC): ICD-10-CM

## 2021-01-28 DIAGNOSIS — E89.0 STATUS POST TOTAL THYROIDECTOMY: ICD-10-CM

## 2021-01-28 LAB
ALBUMIN SERPL-MCNC: 4.2 G/DL (ref 3.5–5.1)
ALP BLD-CCNC: 113 U/L (ref 38–126)
ALT SERPL-CCNC: 30 U/L (ref 11–66)
ANION GAP SERPL CALCULATED.3IONS-SCNC: 12 MEQ/L (ref 8–16)
AST SERPL-CCNC: 30 U/L (ref 5–40)
BILIRUB SERPL-MCNC: 0.3 MG/DL (ref 0.3–1.2)
BUN BLDV-MCNC: 12 MG/DL (ref 7–22)
CALCIUM SERPL-MCNC: 9.8 MG/DL (ref 8.5–10.5)
CHLORIDE BLD-SCNC: 102 MEQ/L (ref 98–111)
CO2: 26 MEQ/L (ref 23–33)
CREAT SERPL-MCNC: 0.7 MG/DL (ref 0.4–1.2)
CREATININE, URINE: 116 MG/DL
GFR SERPL CREATININE-BSD FRML MDRD: 84 ML/MIN/1.73M2
GLUCOSE FASTING: 154 MG/DL (ref 70–108)
LDL CHOLESTEROL DIRECT: 117.77 MG/DL
MICROALBUMIN UR-MCNC: < 1.2 MG/DL
MICROALBUMIN/CREAT UR-RTO: 10 MG/G (ref 0–30)
POTASSIUM SERPL-SCNC: 4.8 MEQ/L (ref 3.5–5.2)
SODIUM BLD-SCNC: 140 MEQ/L (ref 135–145)
TOTAL PROTEIN: 7.9 G/DL (ref 6.1–8)
TSH SERPL DL<=0.05 MIU/L-ACNC: 0.72 UIU/ML (ref 0.4–4.2)

## 2021-01-28 PROCEDURE — 83721 ASSAY OF BLOOD LIPOPROTEIN: CPT

## 2021-01-28 PROCEDURE — 80053 COMPREHEN METABOLIC PANEL: CPT

## 2021-01-28 PROCEDURE — 36415 COLL VENOUS BLD VENIPUNCTURE: CPT

## 2021-01-28 PROCEDURE — 84443 ASSAY THYROID STIM HORMONE: CPT

## 2021-01-28 PROCEDURE — 82043 UR ALBUMIN QUANTITATIVE: CPT

## 2021-02-01 ENCOUNTER — OFFICE VISIT (OUTPATIENT)
Dept: INTERNAL MEDICINE CLINIC | Age: 66
End: 2021-02-01
Payer: COMMERCIAL

## 2021-02-01 VITALS
DIASTOLIC BLOOD PRESSURE: 72 MMHG | TEMPERATURE: 97.9 F | HEART RATE: 80 BPM | SYSTOLIC BLOOD PRESSURE: 134 MMHG | HEIGHT: 69 IN | BODY MASS INDEX: 35.55 KG/M2 | WEIGHT: 240 LBS

## 2021-02-01 DIAGNOSIS — E89.0 POSTOPERATIVE HYPOTHYROIDISM: ICD-10-CM

## 2021-02-01 DIAGNOSIS — E10.65 TYPE 1 DIABETES MELLITUS WITH HYPERGLYCEMIA (HCC): Primary | ICD-10-CM

## 2021-02-01 DIAGNOSIS — E89.0 STATUS POST TOTAL THYROIDECTOMY: ICD-10-CM

## 2021-02-01 DIAGNOSIS — E16.2 HYPOGLYCEMIA: ICD-10-CM

## 2021-02-01 DIAGNOSIS — E04.2 NONTOXIC MULTINODULAR GOITER: ICD-10-CM

## 2021-02-01 LAB — HBA1C MFR BLD: 8.6 % (ref 4.3–5.7)

## 2021-02-01 PROCEDURE — 99214 OFFICE O/P EST MOD 30 MIN: CPT | Performed by: NURSE PRACTITIONER

## 2021-02-01 PROCEDURE — 83036 HEMOGLOBIN GLYCOSYLATED A1C: CPT | Performed by: NURSE PRACTITIONER

## 2021-02-01 PROCEDURE — 3052F HG A1C>EQUAL 8.0%<EQUAL 9.0%: CPT | Performed by: NURSE PRACTITIONER

## 2021-02-01 RX ORDER — IBUPROFEN 600 MG/1
1 TABLET ORAL PRN
Qty: 1 KIT | Refills: 1 | Status: SHIPPED | OUTPATIENT
Start: 2021-02-01 | End: 2021-11-29 | Stop reason: SDUPTHER

## 2021-02-01 ASSESSMENT — PATIENT HEALTH QUESTIONNAIRE - PHQ9
SUM OF ALL RESPONSES TO PHQ QUESTIONS 1-9: 2
2. FEELING DOWN, DEPRESSED OR HOPELESS: 1

## 2021-02-01 NOTE — PROGRESS NOTES
Adarsh Calderon  INTERNAL MEDICINE  [address] [facility] [address]  Dept: [phone]  Dept Fax: [phone]  Loc: [phone]     Visit Date:  2/1/2021    Patient:  Alexa John  YOB: 1955    HPI:     Chief Complaint   Patient presents with    Diabetes       DM - A1C 8.6%. Fasting . Premeal , bedtime 106-HI. Lantus 16 am, 12 units pm.   She did have an event, her  call squad as he couldn't wake her up and glucose was 39 and they gave her a shot, and EMS stayed until she reached 70. Humalog 7 units with meals plus scale. Often takes insulin mid or after the meal as she can't always eat everything. She has urine ketone strips and glucagon injection at home. MACR 10 1/28/2021. States increased stress with parents. Her Mother fell and broke her leg above her prosthetic knee. Required surgery. Sent her to [facility]. Mom got COVID. Got monoclonal antibodies. She signed herself out. [facility], Mom sent them away. Hypothyroidism - Synthroid 137 mcg. Last TSH 0.724 1/28/2021. Denies thyroid s/s including dysphagia, dysphonia, brittle hair/nails, gritty dry eyes, fatigue, hot/cold intolerance. Hyperlipidemia - .77 1/28/2021. On Zetia, doesn't tolerate statins. Medications    Current Outpatient Medications:     Glucagon, rDNA, (GLUCAGON EMERGENCY) 1 MG KIT, Inject 1 kit as directed as needed (hypoglycemia), Disp: 1 kit, Rfl: 1    SYNTHROID 137 MCG tablet, TAKE 1 TABLET DAILY WITH AN EXTRA ONE-HALF (1/2) TABLET ONE DAY PER WEEK, Disp: 96 tablet, Rfl: 3    levothyroxine (SYNTHROID) 137 MCG tablet, Take 1 tablet by mouth daily, Disp: 15 tablet, Rfl: 0    midodrine (PROAMATINE) 5 MG tablet, Take 5 mg by mouth 3 times daily, Disp: , Rfl:     HUMALOG KWIKPEN 100 UNIT/ML SOPN, Inject 7 Units into the skin 3 times daily Plus sliding scale.  Daily maximum dose 45 units, Disp: 15 pen, Rfl: 3   insulin glargine (LANTUS SOLOSTAR) 100 UNIT/ML injection pen, INJECT 18 UNITS IN THE MORNING AND 10 UNITS IN THE EVENING (Patient taking differently: INJECT 16 UNITS IN THE MORNING AND 12 UNITS IN THE EVENING), Disp: 30 mL, Rfl: 3    Cholecalciferol (VITAMIN D3) 25 MCG (1000 UT) CAPS, Take by mouth daily, Disp: , Rfl:     blood glucose monitor strips, Test blood sugars three times a day, Disp: 100 strip, Rfl: 5    Nutritional Supplements (ESTROVEN PO), Take by mouth daily, Disp: , Rfl:     insulin lispro (HUMALOG KWIKPEN) 100 UNIT/ML pen, INJECT 8 UNITS THREE TIMES A DAY BEFORE MEALS, PLUS SLIDING SCALE, MAX DOSE 48 UNITS PER DAY. (Patient taking differently: INJECT 7 UNITS THREE TIMES A DAY BEFORE MEALS, PLUS SLIDING SCALE, MAX DOSE 48 UNITS PER DAY.), Disp: 45 mL, Rfl: 3    ezetimibe (ZETIA) 10 MG tablet, Take 10 mg by mouth daily, Disp: , Rfl:     vitamin E 400 UNIT capsule, Take 400 Units by mouth daily, Disp: , Rfl:     Multiple Vitamins-Minerals (HAIR SKIN AND NAILS FORMULA PO), Take 1 tablet by mouth daily, Disp: , Rfl:     PARoxetine (PAXIL) 10 MG tablet, Take 10 mg by mouth every morning, Disp: , Rfl:     Bimatoprost (LUMIGAN OP), Place 1 drop into both eyes daily, Disp: , Rfl:     glucagon 1 MG injection, Inject 1 mg into the skin See Admin Instructions Follow package directions for low blood sugar., Disp: 1 kit, Rfl: 3    acetone, urine, test strip, Please test urine for ketones if you have nausea, vomiting and abdominal pain., Disp: 10 strip, Rfl: 1    Omega 3 1200 MG CAPS, Take 1,200 capsules by mouth daily, Disp: , Rfl:     brimonidine-timolol (COMBIGAN) 0.2-0.5 % ophthalmic solution, Place 1 drop into both eyes 3 times daily , Disp: , Rfl:     calcium carbonate (TUMS CALCIUM FOR LIFE BONE) 750 MG chewable tablet, Take 1 tablet by mouth daily. , Disp: , Rfl:     aspirin 81 MG tablet, Take 81 mg by mouth daily. , Disp: , Rfl:   therapeutic multivitamin-minerals (THERAGRAN-M) tablet, Take 1 tablet by mouth daily. , Disp: , Rfl:     Ascorbic Acid (VITAMIN C) 500 MG tablet, Take 1,000 mg by mouth daily. , Disp: , Rfl:     The patient is allergic to milk-related compounds; statins; and pcn [penicillins]. Past Medical History  Emily Asencio  has a past medical history of Depression, Fibromyalgia, Glaucoma, Hyperthyroidism, Meningitis, Meningitis spinal, Osteoarthritis, and Type I (juvenile type) diabetes mellitus without mention of complication, not stated as uncontrolled. Past Surgical History  The patient  has a past surgical history that includes  section; Tonsillectomy; Hemorrhoid surgery; knee surgery (Left, 2013); Colonoscopy; Total Thyroidectomy (2013); Eye surgery (Left, 14); and Insertable Cardiac Monitor (Left, 2017). Family History  This patient's family history includes Diabetes in her father; High Cholesterol in her mother; Thyroid Disease in her mother. Social History  Emily Asencio  reports that she has never smoked. She has never used smokeless tobacco. She reports that she does not drink alcohol or use drugs.     Health Maintenance:    Health Maintenance   Topic Date Due    Breast cancer screen  2007    Diabetic retinal exam  2020    Cervical cancer screen  10/26/2021 (Originally 1978)    Flu vaccine (1) 10/26/2021 (Originally 2020)    Diabetic foot exam  2021    Diabetic microalbuminuria test  2022    Lipid screen  2022    TSH testing  2022    Potassium monitoring  2022    Creatinine monitoring  2022    A1C test (Diabetic or Prediabetic)  2022    Colon Cancer Screen FIT/FOBT  2022    DTaP/Tdap/Td vaccine (2 - Td) 10/26/2030    Shingles Vaccine  Completed    Pneumococcal 0-64 years Vaccine  Completed    Hepatitis A vaccine  Aged Out    Hib vaccine  Aged Out    Meningococcal (ACWY) vaccine  Aged Out  Hepatitis C screen  Discontinued    HIV screen  Discontinued       Subjective:      Review of Systems   Constitutional: Negative for chills, fatigue and fever. Respiratory: Negative for cough and shortness of breath. Cardiovascular: Negative for chest pain and leg swelling. Gastrointestinal: Negative for abdominal pain, constipation, diarrhea, nausea and vomiting. Endocrine: Negative for polydipsia and polyphagia. Genitourinary: Negative for difficulty urinating and dysuria. Musculoskeletal: Negative for arthralgias and myalgias. Skin: Negative for rash and wound. Neurological: Negative for numbness. Objective:     /72 (Site: Right Upper Arm, Position: Sitting, Cuff Size: Large Adult)   Pulse 80   Temp 97.9 °F (36.6 °C) (Temporal)   Ht 5' 9\" (1.753 m)   Wt 240 lb (108.9 kg)   LMP 01/21/2013   BMI 35.44 kg/m²     Physical Exam  Vitals signs reviewed. Constitutional:       General: She is not in acute distress. Appearance: She is well-developed. She is not diaphoretic. HENT:      Head: Normocephalic and atraumatic. Neck:      Thyroid: No thyromegaly. Cardiovascular:      Rate and Rhythm: Normal rate and regular rhythm. Heart sounds: Normal heart sounds. No murmur. No friction rub. No gallop. Pulmonary:      Effort: Pulmonary effort is normal. No respiratory distress. Breath sounds: Normal breath sounds. No wheezing or rales. Abdominal:      General: There is no distension. Palpations: Abdomen is soft. Tenderness: There is no abdominal tenderness. Musculoskeletal:         General: No tenderness or deformity. Skin:     General: Skin is warm and dry. Coloration: Skin is not pale. Findings: No erythema. Neurological:      Mental Status: She is alert and oriented to person, place, and time. Cranial Nerves: No cranial nerve deficit.          Labs Reviewed 2/1/2021:    Lab Results   Component Value Date    WBC 6.9 02/10/2021 HGB 12.8 02/10/2021    HCT 39.9 02/10/2021     02/10/2021    CHOL 189 2019    TRIG 157 2019    HDL 54 2020    LDLDIRECT 117.77 2021    ALT 30 2021    AST 30 2021     2021    K 4.8 2021     2021    CREATININE 0.7 2021    BUN 12 2021    CO2 26 2021    TSH 0.724 2021    PSA <0.01 2014    GLUF 154 (H) 2021    LABA1C 8.6 (H) 2021    LABMICR < 1.20 2021       Assessment/Plan      1. Type 1 diabetes mellitus with hyperglycemia (HCC)  A1C 8.6% with recent hypoglycemic episode. Check glucose three times daily, fasting in am, before meals and at bedtime. Continue current insulins, attention to diet and meal coverage. Do not skip meals when taking insulins. Keep meal/glucose logs. Bring in logs in two weeks for adjustment. Do not take insulin post meal due to risk of lows. - POCT glycosylated hemoglobin (Hb A1C)  - 40523 - Collection Capillary Blood Specimen  - Basic Metabolic Panel; Future    2. Nontoxic multinodular goiter  - US HEAD NECK SOFT TISSUE THYROID; Future  - TSH With Reflex Ft4; Future    3. Status post total thyroidectomy  - US HEAD NECK SOFT TISSUE THYROID; Future  - TSH With Reflex Ft4; Future    4. Postoperative hypothyroidism  TSH 0.724   Continue current Synthroid. - US HEAD NECK SOFT TISSUE THYROID; Future  - TSH With Reflex Ft4; Future    5. Hypoglycemia  With recent significant event. States her other glucagon is likely . Can educate  on use, make nurse visit appt. Call if any further events. - Glucagon, rDNA, (GLUCAGON EMERGENCY) 1 MG KIT; Inject 1 kit as directed as needed (hypoglycemia)  Dispense: 1 kit; Refill: 1      Return in about 6 weeks (around 3/15/2021). Patient given educational materials - see patient instructions. Discussed use, benefit, and side effects of prescribed medications. All patient questions answered. Pt voiced understanding. Reviewed health maintenance.        Electronically signed GONSALO Villareal CNP on 2/1/21 at 12:00 PM EST

## 2021-02-01 NOTE — PATIENT INSTRUCTIONS
Check glucose three times daily, fasting in am, before meals and at bedtime. Continue current insulins, attention to diet and meal coverage. Keep meal/glucose logs. Recheck thyroid function tests in 3 months.

## 2021-02-03 ENCOUNTER — HOSPITAL ENCOUNTER (OUTPATIENT)
Dept: ULTRASOUND IMAGING | Age: 66
Discharge: HOME | End: 2021-02-03
Payer: COMMERCIAL

## 2021-02-03 ENCOUNTER — HOSPITAL ENCOUNTER (OUTPATIENT)
Age: 66
Discharge: HOME | End: 2021-02-03
Payer: COMMERCIAL

## 2021-02-03 DIAGNOSIS — E89.0 STATUS POST TOTAL THYROIDECTOMY: ICD-10-CM

## 2021-02-03 DIAGNOSIS — E04.2 NONTOXIC MULTINODULAR GOITER: ICD-10-CM

## 2021-02-03 DIAGNOSIS — Z12.11 COLON CANCER SCREENING: ICD-10-CM

## 2021-02-03 DIAGNOSIS — E89.0 POSTOPERATIVE HYPOTHYROIDISM: ICD-10-CM

## 2021-02-03 LAB
REASON FOR REJECTION: NORMAL
REJECTED TEST: NORMAL

## 2021-02-03 PROCEDURE — 76536 US EXAM OF HEAD AND NECK: CPT

## 2021-02-04 ENCOUNTER — TELEPHONE (OUTPATIENT)
Dept: INTERNAL MEDICINE CLINIC | Age: 66
End: 2021-02-04

## 2021-02-04 ENCOUNTER — HOSPITAL ENCOUNTER (OUTPATIENT)
Age: 66
Setting detail: SPECIMEN
Discharge: HOME | End: 2021-02-04
Payer: COMMERCIAL

## 2021-02-04 LAB — FECAL BLOOD IMMUNOCHEMICAL TEST: POSITIVE

## 2021-02-04 PROCEDURE — 82274 ASSAY TEST FOR BLOOD FECAL: CPT

## 2021-02-09 ENCOUNTER — TELEPHONE (OUTPATIENT)
Dept: INTERNAL MEDICINE CLINIC | Age: 66
End: 2021-02-09

## 2021-02-09 DIAGNOSIS — R19.5 POSITIVE FIT (FECAL IMMUNOCHEMICAL TEST): Primary | ICD-10-CM

## 2021-02-09 NOTE — TELEPHONE ENCOUNTER
----- Message from GONSALO Bucio CNP sent at 2/4/2021  4:27 PM EST -----  Could we get more information on this please?  I thought there was more documentation that went with this - however this is positive - needs to see GI and needs CBC drawn

## 2021-02-10 ENCOUNTER — HOSPITAL ENCOUNTER (OUTPATIENT)
Age: 66
Discharge: HOME | End: 2021-02-10
Payer: COMMERCIAL

## 2021-02-10 LAB
BASOPHILS # BLD: 0.6 %
BASOPHILS ABSOLUTE: 0 THOU/MM3 (ref 0–0.1)
EOSINOPHIL # BLD: 2.6 %
EOSINOPHILS ABSOLUTE: 0.2 THOU/MM3 (ref 0–0.4)
ERYTHROCYTE [DISTWIDTH] IN BLOOD BY AUTOMATED COUNT: 12.6 % (ref 11.5–14.5)
ERYTHROCYTE [DISTWIDTH] IN BLOOD BY AUTOMATED COUNT: 41.1 FL (ref 35–45)
HCT VFR BLD CALC: 39.9 % (ref 37–47)
HEMOGLOBIN: 12.8 GM/DL (ref 12–16)
IMMATURE GRANS (ABS): 0.01 THOU/MM3 (ref 0–0.07)
IMMATURE GRANULOCYTES: 0.1 %
LYMPHOCYTES # BLD: 31.2 %
LYMPHOCYTES ABSOLUTE: 2.2 THOU/MM3 (ref 1–4.8)
MCH RBC QN AUTO: 28.6 PG (ref 26–33)
MCHC RBC AUTO-ENTMCNC: 32.1 GM/DL (ref 32.2–35.5)
MCV RBC AUTO: 89.1 FL (ref 81–99)
MONOCYTES # BLD: 6.8 %
MONOCYTES ABSOLUTE: 0.5 THOU/MM3 (ref 0.4–1.3)
NUCLEATED RED BLOOD CELLS: 0 /100 WBC
PLATELET # BLD: 300 THOU/MM3 (ref 130–400)
PMV BLD AUTO: 9.3 FL (ref 9.4–12.4)
RBC # BLD: 4.48 MILL/MM3 (ref 4.2–5.4)
SEG NEUTROPHILS: 58.7 %
SEGMENTED NEUTROPHILS ABSOLUTE COUNT: 4.1 THOU/MM3 (ref 1.8–7.7)
WBC # BLD: 6.9 THOU/MM3 (ref 4.8–10.8)

## 2021-02-10 PROCEDURE — 36415 COLL VENOUS BLD VENIPUNCTURE: CPT

## 2021-02-10 PROCEDURE — 85025 COMPLETE CBC W/AUTO DIFF WBC: CPT

## 2021-02-13 ASSESSMENT — ENCOUNTER SYMPTOMS
ABDOMINAL PAIN: 0
VOMITING: 0
SHORTNESS OF BREATH: 0
COUGH: 0
NAUSEA: 0
DIARRHEA: 0
CONSTIPATION: 0

## 2021-02-24 DIAGNOSIS — E03.9 ACQUIRED HYPOTHYROIDISM: ICD-10-CM

## 2021-02-28 RX ORDER — LEVOTHYROXINE SODIUM 137 MCG
TABLET ORAL
Qty: 96 TABLET | Refills: 3 | Status: SHIPPED | OUTPATIENT
Start: 2021-02-28 | End: 2021-03-03

## 2021-03-03 DIAGNOSIS — E89.0 STATUS POST TOTAL THYROIDECTOMY: Primary | ICD-10-CM

## 2021-03-03 RX ORDER — LEVOTHYROXINE SODIUM 137 UG/1
137 TABLET ORAL DAILY
Qty: 90 TABLET | Refills: 3 | Status: SHIPPED | OUTPATIENT
Start: 2021-03-03 | End: 2021-11-23 | Stop reason: SDUPTHER

## 2021-03-10 ENCOUNTER — HOSPITAL ENCOUNTER (OUTPATIENT)
Age: 66
Discharge: HOME | End: 2021-03-10
Payer: COMMERCIAL

## 2021-03-10 DIAGNOSIS — E89.0 STATUS POST TOTAL THYROIDECTOMY: ICD-10-CM

## 2021-03-10 DIAGNOSIS — E10.65 TYPE 1 DIABETES MELLITUS WITH HYPERGLYCEMIA (HCC): ICD-10-CM

## 2021-03-10 DIAGNOSIS — E04.2 NONTOXIC MULTINODULAR GOITER: ICD-10-CM

## 2021-03-10 DIAGNOSIS — E89.0 POSTOPERATIVE HYPOTHYROIDISM: ICD-10-CM

## 2021-03-10 LAB
ANION GAP SERPL CALCULATED.3IONS-SCNC: 11 MEQ/L (ref 8–16)
BUN BLDV-MCNC: 14 MG/DL (ref 7–22)
CALCIUM SERPL-MCNC: 9.9 MG/DL (ref 8.5–10.5)
CHLORIDE BLD-SCNC: 100 MEQ/L (ref 98–111)
CO2: 27 MEQ/L (ref 23–33)
CREAT SERPL-MCNC: 0.9 MG/DL (ref 0.4–1.2)
GFR SERPL CREATININE-BSD FRML MDRD: 63 ML/MIN/1.73M2
GLUCOSE BLD-MCNC: 246 MG/DL (ref 70–108)
POTASSIUM SERPL-SCNC: 4.8 MEQ/L (ref 3.5–5.2)
SODIUM BLD-SCNC: 138 MEQ/L (ref 135–145)
TSH SERPL DL<=0.05 MIU/L-ACNC: 0.42 UIU/ML (ref 0.4–4.2)

## 2021-03-10 PROCEDURE — 80048 BASIC METABOLIC PNL TOTAL CA: CPT

## 2021-03-10 PROCEDURE — 36415 COLL VENOUS BLD VENIPUNCTURE: CPT

## 2021-03-10 PROCEDURE — 84443 ASSAY THYROID STIM HORMONE: CPT

## 2021-03-12 ENCOUNTER — TELEPHONE (OUTPATIENT)
Dept: INTERNAL MEDICINE CLINIC | Age: 66
End: 2021-03-12

## 2021-03-12 NOTE — TELEPHONE ENCOUNTER
LPN called patient- left  letting her know her labs were normal and to continue her current plan and asked her to drop off her glucose logs to the office.

## 2021-03-24 ENCOUNTER — OFFICE VISIT (OUTPATIENT)
Dept: INTERNAL MEDICINE CLINIC | Age: 66
End: 2021-03-24
Payer: COMMERCIAL

## 2021-03-24 VITALS
DIASTOLIC BLOOD PRESSURE: 74 MMHG | SYSTOLIC BLOOD PRESSURE: 126 MMHG | HEIGHT: 69 IN | TEMPERATURE: 96.4 F | BODY MASS INDEX: 34.66 KG/M2 | WEIGHT: 234 LBS | HEART RATE: 86 BPM

## 2021-03-24 DIAGNOSIS — R19.5 POSITIVE FIT (FECAL IMMUNOCHEMICAL TEST): ICD-10-CM

## 2021-03-24 DIAGNOSIS — E03.9 ACQUIRED HYPOTHYROIDISM: ICD-10-CM

## 2021-03-24 DIAGNOSIS — E89.0 STATUS POST TOTAL THYROIDECTOMY: ICD-10-CM

## 2021-03-24 DIAGNOSIS — E10.65 TYPE 1 DIABETES MELLITUS WITH HYPERGLYCEMIA (HCC): Primary | ICD-10-CM

## 2021-03-24 PROCEDURE — 3052F HG A1C>EQUAL 8.0%<EQUAL 9.0%: CPT | Performed by: NURSE PRACTITIONER

## 2021-03-24 PROCEDURE — 99214 OFFICE O/P EST MOD 30 MIN: CPT | Performed by: NURSE PRACTITIONER

## 2021-03-24 NOTE — PROGRESS NOTES
Adarsh Calderon [facility] INTERNAL MEDICINE  [address] [facility] [address]  Dept: [phone]  Dept Fax: [phone]  Loc: [phone]     Visit Date:  3/24/2021    Patient:  Mary Ariza  YOB: 1955    HPI:     Chief Complaint   Patient presents with    Diabetes     A1C 2/1/2021    Hypothyroidism     6wk follow up- TSH done       HPI    DM 1 - A1C 8.6% 2/1/2021. On basal/bolus insulin, Lantus 16 units am, 12 units pm. Humalog 7 units with meals. Had a low last night, treated with 5 glucose tabs. Fasting , a lot 150-200. Later in the day 105-402 (few readings)    Hypothyroidism - On Synthroid 137 mcg daily and 1.5 tabs Sunday. Reports fatigue, brittle hair/nails, gritty dry eyes,. Denies thyroid s/s including dysphagia, dysphonia, hot/cold intolerance. Results for Everlena Severs (MRN [id number]) as of 3/24/2021 10:15   Ref. Range 3/10/2021 10:26   TSH Latest Ref Range: 0.400 - 4.200 uIU/mL 0.422       HLD - On Zetia 10 mg daily. Does not tolerate statins. Positive FIT test - (thought was supposed to be Cologuard test) Did not see GI, states she has hemorrhoids and has had this before. Has had colonoscopy in 2013/2014 and stated the only thing they saw was hemorrhoids. Mom has been in the hospital for a month.      Medications    Current Outpatient Medications:     levothyroxine (SYNTHROID) 137 MCG tablet, Take 1 tablet by mouth daily (Patient taking differently: Take 137 mcg by mouth daily 1 tablet daily and 1.5 tablet on sunday), Disp: 90 tablet, Rfl: 3    Glucagon, rDNA, (GLUCAGON EMERGENCY) 1 MG KIT, Inject 1 kit as directed as needed (hypoglycemia), Disp: 1 kit, Rfl: 1    midodrine (PROAMATINE) 5 MG tablet, Take 5 mg by mouth 3 times daily, Disp: , Rfl:     insulin glargine (LANTUS SOLOSTAR) 100 UNIT/ML injection pen, INJECT 18 UNITS IN THE MORNING AND 10 UNITS IN THE EVENING (Patient taking differently: INJECT 16 UNITS IN THE MORNING AND 12 UNITS IN THE EVENING), Disp: 30 mL, Rfl: 3    Cholecalciferol (VITAMIN D3) 25 MCG (1000 UT) CAPS, Take by mouth daily, Disp: , Rfl:     Nutritional Supplements (ESTROVEN PO), Take by mouth daily, Disp: , Rfl:     insulin lispro (HUMALOG KWIKPEN) 100 UNIT/ML pen, INJECT 8 UNITS THREE TIMES A DAY BEFORE MEALS, PLUS SLIDING SCALE, MAX DOSE 48 UNITS PER DAY. (Patient taking differently: INJECT 7 UNITS THREE TIMES A DAY BEFORE MEALS, PLUS SLIDING SCALE, MAX DOSE 48 UNITS PER DAY.), Disp: 45 mL, Rfl: 3    ezetimibe (ZETIA) 10 MG tablet, Take 10 mg by mouth daily, Disp: , Rfl:     vitamin E 400 UNIT capsule, Take 400 Units by mouth daily, Disp: , Rfl:     Multiple Vitamins-Minerals (HAIR SKIN AND NAILS FORMULA PO), Take 1 tablet by mouth daily, Disp: , Rfl:     PARoxetine (PAXIL) 10 MG tablet, Take 10 mg by mouth every morning, Disp: , Rfl:     Bimatoprost (LUMIGAN OP), Place 1 drop into both eyes daily, Disp: , Rfl:     Omega 3 1200 MG CAPS, Take 1,200 capsules by mouth daily, Disp: , Rfl:     brimonidine-timolol (COMBIGAN) 0.2-0.5 % ophthalmic solution, Place 1 drop into both eyes 3 times daily , Disp: , Rfl:     calcium carbonate (TUMS CALCIUM FOR LIFE BONE) 750 MG chewable tablet, Take 1 tablet by mouth daily. , Disp: , Rfl:     aspirin 81 MG tablet, Take 81 mg by mouth daily. , Disp: , Rfl:     therapeutic multivitamin-minerals (THERAGRAN-M) tablet, Take 1 tablet by mouth daily. , Disp: , Rfl:     Ascorbic Acid (VITAMIN C) 500 MG tablet, Take 1,000 mg by mouth daily. , Disp: , Rfl:     blood glucose monitor strips, Test blood sugars three times a day, Disp: 100 strip, Rfl: 5    The patient is allergic to milk-related compounds; statins; and pcn [penicillins].     Past Medical History  Jean Szymanski  has a past medical history of Depression, Fibromyalgia, Glaucoma, Hyperthyroidism, Meningitis, Meningitis spinal, Osteoarthritis, and Type I (juvenile type) diabetes mellitus without mention of complication, not stated as uncontrolled. Past Surgical History  The patient  has a past surgical history that includes  section; Tonsillectomy; Hemorrhoid surgery; knee surgery (Left, 2013); Colonoscopy; Total Thyroidectomy (2013); Eye surgery (Left, 14); and Insertable Cardiac Monitor (Left, 2017). Family History  This patient's family history includes Diabetes in her father; High Cholesterol in her mother; Other in her brother and father; Thyroid Disease in her mother. Social History  Rylie Breaux  reports that she has never smoked. She has never used smokeless tobacco. She reports that she does not drink alcohol or use drugs. Health Maintenance:    Health Maintenance   Topic Date Due    Breast cancer screen  Never done    Diabetic retinal exam  2020    COVID-19 Vaccine (2 - Moderna 2-dose series) 2021    Cervical cancer screen  10/26/2021 (Originally 1978)    Flu vaccine (Season Ended) 10/26/2021 (Originally 2021)    Diabetic foot exam  2021    Diabetic microalbuminuria test  2022    Lipid screen  2022    A1C test (Diabetic or Prediabetic)  2022    Colon Cancer Screen FIT/FOBT  2022    TSH testing  03/10/2022    Potassium monitoring  03/10/2022    Creatinine monitoring  03/10/2022    DTaP/Tdap/Td vaccine (2 - Td) 10/26/2030    Shingles Vaccine  Completed    Pneumococcal 0-64 years Vaccine  Completed    Hepatitis A vaccine  Aged Out    Hib vaccine  Aged Out    Meningococcal (ACWY) vaccine  Aged Out    Hepatitis C screen  Discontinued    HIV screen  Discontinued       Subjective:      Review of Systems   Constitutional: Negative for chills, fatigue and fever. Respiratory: Negative for cough and shortness of breath. Cardiovascular: Negative for chest pain and leg swelling. Gastrointestinal: Negative for abdominal pain, constipation, diarrhea, nausea and vomiting.    Endocrine: Negative for polydipsia Assessment/Plan      1. Type 1 diabetes mellitus with hyperglycemia (HCC)  Reduce Lantus to 14 units. Continue Lispro 7 units with meals. Glucose logs in two weeks. Call if any further lows. 2. Acquired hypothyroidism  Check thyroid function in 4 weeks. - TSH; Future  - T4, Free; Future    3. Status post total thyroidectomy  - TSH; Future  - T4, Free; Future    4. Positive FIT (fecal immunochemical test)  Cologuard, patient declines GI evaluation. Denies any visible blood in the stools. She states that she has had positive test in the past due to hemorrhoids. - Cologuard (For External Results Only); Future      Return in about 6 weeks (around 5/5/2021) for Diabetes type 1. Patient given educational materials - see patient instructions. Discussed use, benefit, and side effects of prescribed medications. All patient questions answered. Pt voiced understanding.       Electronically signed GONSALO Andrews CNP on 3/24/21 at 10:12 AM EDT

## 2021-03-24 NOTE — PATIENT INSTRUCTIONS
Reduce Lantus to 14 units. Continue Lispro 7 units with meals. Glucose logs in two weeks. Call if any further lows. Recheck thyroid function in 4 weeks. Cologuard to evaluate due to positive FIT test. Or GI evaluation for this when you are able.

## 2021-04-05 ASSESSMENT — ENCOUNTER SYMPTOMS
COUGH: 0
ABDOMINAL PAIN: 0
DIARRHEA: 0
CONSTIPATION: 0
NAUSEA: 0
SHORTNESS OF BREATH: 0
VOMITING: 0

## 2021-04-21 ENCOUNTER — HOSPITAL ENCOUNTER (OUTPATIENT)
Age: 66
Discharge: HOME | End: 2021-04-21
Payer: COMMERCIAL

## 2021-04-21 ENCOUNTER — HOSPITAL ENCOUNTER (OUTPATIENT)
Dept: GENERAL RADIOLOGY | Age: 66
Discharge: HOME | End: 2021-04-21
Payer: COMMERCIAL

## 2021-04-21 DIAGNOSIS — E89.0 STATUS POST TOTAL THYROIDECTOMY: ICD-10-CM

## 2021-04-21 DIAGNOSIS — E03.9 ACQUIRED HYPOTHYROIDISM: ICD-10-CM

## 2021-04-21 DIAGNOSIS — M77.42 METATARSALGIA, LEFT FOOT: ICD-10-CM

## 2021-04-21 LAB
T4 FREE: 1.47 NG/DL (ref 0.93–1.76)
TSH SERPL DL<=0.05 MIU/L-ACNC: 1.07 UIU/ML (ref 0.4–4.2)

## 2021-04-21 PROCEDURE — 84439 ASSAY OF FREE THYROXINE: CPT

## 2021-04-21 PROCEDURE — 84443 ASSAY THYROID STIM HORMONE: CPT

## 2021-04-21 PROCEDURE — 73630 X-RAY EXAM OF FOOT: CPT

## 2021-04-21 PROCEDURE — 36415 COLL VENOUS BLD VENIPUNCTURE: CPT

## 2021-04-26 ENCOUNTER — TELEPHONE (OUTPATIENT)
Dept: INTERNAL MEDICINE CLINIC | Age: 66
End: 2021-04-26

## 2021-04-26 NOTE — TELEPHONE ENCOUNTER
----- Message from GONSALO Caraballo - CNP sent at 4/20/2021 10:07 AM EDT -----  Negative cologuard. Recheck in 3 years.

## 2021-04-26 NOTE — TELEPHONE ENCOUNTER
----- Message from GONSALO Cotton - CNP sent at 4/21/2021  1:13 PM EDT -----  Stable thyroid function, continue current Synthroid

## 2021-05-05 ENCOUNTER — OFFICE VISIT (OUTPATIENT)
Dept: INTERNAL MEDICINE CLINIC | Age: 66
End: 2021-05-05
Payer: COMMERCIAL

## 2021-05-05 VITALS
HEART RATE: 84 BPM | SYSTOLIC BLOOD PRESSURE: 122 MMHG | DIASTOLIC BLOOD PRESSURE: 84 MMHG | WEIGHT: 234 LBS | TEMPERATURE: 98.1 F | BODY MASS INDEX: 34.66 KG/M2 | HEIGHT: 69 IN

## 2021-05-05 DIAGNOSIS — E89.0 STATUS POST TOTAL THYROIDECTOMY: ICD-10-CM

## 2021-05-05 DIAGNOSIS — E78.00 PURE HYPERCHOLESTEROLEMIA: ICD-10-CM

## 2021-05-05 DIAGNOSIS — I10 ESSENTIAL HYPERTENSION: ICD-10-CM

## 2021-05-05 DIAGNOSIS — E10.65 TYPE 1 DIABETES MELLITUS WITH HYPERGLYCEMIA (HCC): Primary | ICD-10-CM

## 2021-05-05 LAB — HBA1C MFR BLD: 8.7 % (ref 4.3–5.7)

## 2021-05-05 PROCEDURE — 99214 OFFICE O/P EST MOD 30 MIN: CPT | Performed by: INTERNAL MEDICINE

## 2021-05-05 PROCEDURE — 3052F HG A1C>EQUAL 8.0%<EQUAL 9.0%: CPT | Performed by: INTERNAL MEDICINE

## 2021-05-05 PROCEDURE — 83036 HEMOGLOBIN GLYCOSYLATED A1C: CPT | Performed by: INTERNAL MEDICINE

## 2021-05-05 RX ORDER — LANCETS 30 GAUGE
EACH MISCELLANEOUS
Qty: 300 EACH | Refills: 3 | Status: SHIPPED | OUTPATIENT
Start: 2021-05-05 | End: 2021-06-07 | Stop reason: SDUPTHER

## 2021-05-05 RX ORDER — GLUCOSAMINE HCL/CHONDROITIN SU 500-400 MG
CAPSULE ORAL
Qty: 300 STRIP | Refills: 3 | Status: SHIPPED | OUTPATIENT
Start: 2021-05-05 | End: 2021-06-07 | Stop reason: SDUPTHER

## 2021-05-05 SDOH — ECONOMIC STABILITY: TRANSPORTATION INSECURITY
IN THE PAST 12 MONTHS, HAS THE LACK OF TRANSPORTATION KEPT YOU FROM MEDICAL APPOINTMENTS OR FROM GETTING MEDICATIONS?: NO

## 2021-05-05 SDOH — ECONOMIC STABILITY: FOOD INSECURITY: WITHIN THE PAST 12 MONTHS, THE FOOD YOU BOUGHT JUST DIDN'T LAST AND YOU DIDN'T HAVE MONEY TO GET MORE.: NEVER TRUE

## 2021-05-05 NOTE — PROGRESS NOTES
[address] [facility] Internal Medicine   [facility] [address], [address]  Dept: [phone]  Dept Fax: [phone]    YOB: 1955    Type I diabetes mellitus/dyslipidemia    61 y.o. female   here for follow-up of above issues. And does see Suzanne Padilla for medical issues here in town were following up mainly of the diabetes and the dyslipidemia. She has seen Jan Reddy CNP in the past she is no longer with the practice she elected to follow-up with me . I am seeing her for the first time  This office visit today. Last office note noted along with the laboratory results. She has  thyroidectomy and now has hypothyroidism for which she is taking supplements. Diagnosed with DM back on 2000, strong family hx of DM. She is an obese WF not in distress, she does have retinopathy , followed by ophthalmology Q 6 months. No CAD, seen dr. Emily Love. Taking midodrine 5 mg TID for that, due to ortho static hypotension. A1c was 10 in the past, last 8.6,  And today it was checked, results Pending. Got the covid vaccine. She has an old gluconometer. She does have chronic hip pain bilaterally.      Current Outpatient Medications   Medication Sig Dispense Refill    levothyroxine (SYNTHROID) 137 MCG tablet Take 1 tablet by mouth daily (Patient taking differently: Take 137 mcg by mouth daily 1 tablet daily and 1.5 tablet on sunday) 90 tablet 3    Glucagon, rDNA, (GLUCAGON EMERGENCY) 1 MG KIT Inject 1 kit as directed as needed (hypoglycemia) 1 kit 1    midodrine (PROAMATINE) 5 MG tablet Take 5 mg by mouth 3 times daily      insulin glargine (LANTUS SOLOSTAR) 100 UNIT/ML injection pen INJECT 18 UNITS IN THE MORNING AND 10 UNITS IN THE EVENING (Patient taking differently: INJECT 18 UNITS IN THE MORNING AND 14 UNITS IN THE EVENING) 30 mL 3    Cholecalciferol (VITAMIN D3) 25 MCG (1000 UT) CAPS Take by mouth daily      blood glucose monitor strips Test blood sugars three times a day 100 strip 5    Nutritional Supplements (ESTROVEN PO) Take by mouth daily      insulin lispro (HUMALOG KWIKPEN) 100 UNIT/ML pen INJECT 8 UNITS THREE TIMES A DAY BEFORE MEALS, PLUS SLIDING SCALE, MAX DOSE 48 UNITS PER DAY. (Patient taking differently: INJECT 7 UNITS THREE TIMES A DAY BEFORE MEALS, PLUS SLIDING SCALE, MAX DOSE 48 UNITS PER DAY. ) 45 mL 3    ezetimibe (ZETIA) 10 MG tablet Take 10 mg by mouth daily      vitamin E 400 UNIT capsule Take 400 Units by mouth daily      Multiple Vitamins-Minerals (HAIR SKIN AND NAILS FORMULA PO) Take 1 tablet by mouth daily      PARoxetine (PAXIL) 10 MG tablet Take 10 mg by mouth every morning      Bimatoprost (LUMIGAN OP) Place 1 drop into both eyes daily      Omega 3 1200 MG CAPS Take 1,200 capsules by mouth daily      brimonidine-timolol (COMBIGAN) 0.2-0.5 % ophthalmic solution Place 1 drop into both eyes 3 times daily       calcium carbonate (TUMS CALCIUM FOR LIFE BONE) 750 MG chewable tablet Take 1 tablet by mouth daily.  aspirin 81 MG tablet Take 81 mg by mouth daily.  therapeutic multivitamin-minerals (THERAGRAN-M) tablet Take 1 tablet by mouth daily.  Ascorbic Acid (VITAMIN C) 500 MG tablet Take 1,000 mg by mouth daily. No current facility-administered medications for this visit.         Past Medical History:   Diagnosis Date    Depression     Fibromyalgia     Glaucoma     Hyperthyroidism     Meningitis 10-    viral     Meningitis spinal 10/04/2013    Viral    Osteoarthritis     Type I (juvenile type) diabetes mellitus without mention of complication, not stated as uncontrolled 2000       Social History     Socioeconomic History    Marital status:      Spouse name: Not on file    Number of children: Not on file    Years of education: Not on file    Highest education level: Not on file   Occupational History    Occupation: [employer]   Social Needs    Financial resource strain: Not very hard   Noblivity insecurity     Worry: Never true     Inability: Never true    Transportation needs     Medical: No     Non-medical: No   Tobacco Use    Smoking status: Never Smoker    Smokeless tobacco: Never Used   Substance and Sexual Activity    Alcohol use: No     Alcohol/week: 0.0 standard drinks     Comment: rarely    Drug use: No    Sexual activity: Not on file   Lifestyle    Physical activity     Days per week: Not on file     Minutes per session: Not on file    Stress: Not on file   Relationships    Social connections     Talks on phone: Not on file     Gets together: Not on file     Attends [employer] service: Not on file     Active member of club or organization: Not on file     Attends meetings of clubs or organizations: Not on file     Relationship status: Not on file    Intimate partner violence     Fear of current or ex partner: Not on file     Emotionally abused: Not on file     Physically abused: Not on file     Forced sexual activity: Not on file   Other Topics Concern    Not on file   Social History Narrative    Not on file       Family History   Problem Relation Age of Onset    Thyroid Disease Mother     High Cholesterol Mother     Diabetes Father     Other Father         arachnoid cyst    Other Brother         arachnoid cyst       Allergies   Allergen Reactions    Milk-Related Compounds Other (See Comments)     Severe constipation    Statins      myalgia    Pcn [Penicillins] Rash       Review of Systems     See HPI     /84 (Site: Left Upper Arm)   Pulse 84   Temp 98.1 °F (36.7 °C)   Ht 5' 9\" (1.753 m)   Wt 234 lb (106.1 kg)   LMP 01/21/2013   BMI 34.56 kg/m²      Physical Examination: General appearance - patient alert, well appearing, and in no distress, she is obese  Mental status - alert and oriented    Neck - supple, no significant adenopathy  Chest - clear to auscultation, no wheezes, rales or rhonchi, symmetric air entry  Heart - normal rate, regular rhythm, normal S1, S2, no murmurs, rubs, clicks or gallops  Abdomen - soft, nontender, nondistended, no masses or organomegaly  no abdominal bruits. Obese Protuberant: No  Neurological - alert, oriented, normal speech, no focal findings or movement disorder noted, motor and sensory grossly normal bilaterally  Musculoskeletal - no joint tenderness, deformity or swelling  Extremities - peripheral pulses normal, no pedal edema, no clubbing or cyanosis, Tess's sign negative bilaterally  Prosthesis: No  Skin - normal coloration and turgor, no rashes, no suspicious skin lesions noted      I have reviewed recent diagnostic testing including labs, EKG. Please see EKG for interpretation. Diagnosis Orders   1. Type 1 diabetes mellitus with hyperglycemia (HCC)  POCT glycosylated hemoglobin (Hb A1C)    08175 - Collection Capillary Blood Specimen   2. Essential hypertension     3. Pure hypercholesterolemia     4.  Status post total thyroidectomy         Orders Placed This Encounter   Procedures    POCT glycosylated hemoglobin (Hb A1C)    23596 - Collection Capillary Blood Specimen       Outpatient Encounter Medications as of 5/5/2021   Medication Sig Dispense Refill    levothyroxine (SYNTHROID) 137 MCG tablet Take 1 tablet by mouth daily (Patient taking differently: Take 137 mcg by mouth daily 1 tablet daily and 1.5 tablet on sunday) 90 tablet 3    Glucagon, rDNA, (GLUCAGON EMERGENCY) 1 MG KIT Inject 1 kit as directed as needed (hypoglycemia) 1 kit 1    midodrine (PROAMATINE) 5 MG tablet Take 5 mg by mouth 3 times daily      insulin glargine (LANTUS SOLOSTAR) 100 UNIT/ML injection pen INJECT 18 UNITS IN THE MORNING AND 10 UNITS IN THE EVENING (Patient taking differently: INJECT 18 UNITS IN THE MORNING AND 14 UNITS IN THE EVENING) 30 mL 3    Cholecalciferol (VITAMIN D3) 25 MCG (1000 UT) CAPS Take by mouth daily      blood glucose monitor strips Test blood sugars three times a day 100 strip 5    Nutritional Supplements (ESTROVEN PO) Take

## 2021-06-07 NOTE — TELEPHONE ENCOUNTER
Pt called back and states insurance covers all CGM through [employer] program. Pt will need Rx for CGM of choice (looks like pt may have had a quan in 2018). Print Rx to be faxed to [id number].

## 2021-06-07 NOTE — TELEPHONE ENCOUNTER
Rx for Glucose Meter, Test strips and [facility]ets [facility]. I will call this into local pharmacy, [facility]. Pt to call insurance to see what CGM is covered on insurance so Stefan can get her a device.

## 2021-06-13 RX ORDER — FLASH GLUCOSE SENSOR
1 KIT MISCELLANEOUS
Qty: 2 EACH | Refills: 5 | Status: SHIPPED | OUTPATIENT
Start: 2021-06-13 | End: 2021-08-10

## 2021-06-13 RX ORDER — LANCETS 30 GAUGE
EACH MISCELLANEOUS
Qty: 300 EACH | Refills: 3 | OUTPATIENT
Start: 2021-06-13 | End: 2021-08-11 | Stop reason: SDUPTHER

## 2021-06-13 RX ORDER — GLUCOSAMINE HCL/CHONDROITIN SU 500-400 MG
CAPSULE ORAL
Qty: 300 STRIP | Refills: 3 | OUTPATIENT
Start: 2021-06-13 | End: 2021-08-11 | Stop reason: SDUPTHER

## 2021-06-13 RX ORDER — FLASH GLUCOSE SCANNING READER
1 EACH MISCELLANEOUS DAILY
Qty: 1 DEVICE | Refills: 0 | Status: SHIPPED | OUTPATIENT
Start: 2021-06-13 | End: 2021-08-10

## 2021-07-06 ENCOUNTER — TELEPHONE (OUTPATIENT)
Dept: INTERNAL MEDICINE CLINIC | Age: 66
End: 2021-07-06

## 2021-07-26 DIAGNOSIS — E10.65 TYPE 1 DIABETES MELLITUS WITH HYPERGLYCEMIA (HCC): ICD-10-CM

## 2021-07-26 RX ORDER — INSULIN GLARGINE 100 [IU]/ML
INJECTION, SOLUTION SUBCUTANEOUS
Qty: 15 PEN | Refills: 3 | Status: SHIPPED | OUTPATIENT
Start: 2021-07-26 | End: 2021-09-17 | Stop reason: DRUGHIGH

## 2021-08-05 ENCOUNTER — HOSPITAL ENCOUNTER (OUTPATIENT)
Age: 66
Discharge: HOME | End: 2021-08-05
Payer: COMMERCIAL

## 2021-08-05 DIAGNOSIS — I10 ESSENTIAL HYPERTENSION: ICD-10-CM

## 2021-08-05 DIAGNOSIS — E10.65 TYPE 1 DIABETES MELLITUS WITH HYPERGLYCEMIA (HCC): ICD-10-CM

## 2021-08-05 DIAGNOSIS — E78.00 PURE HYPERCHOLESTEROLEMIA: ICD-10-CM

## 2021-08-05 LAB
ALBUMIN SERPL-MCNC: 4.7 G/DL (ref 3.5–5.1)
ALBUMIN SERPL-MCNC: 4.7 G/DL (ref 3.5–5.1)
ALP BLD-CCNC: 113 U/L (ref 38–126)
ALP BLD-CCNC: 114 U/L (ref 38–126)
ALT SERPL-CCNC: 29 U/L (ref 11–66)
ALT SERPL-CCNC: 30 U/L (ref 11–66)
ANION GAP SERPL CALCULATED.3IONS-SCNC: 12 MEQ/L (ref 8–16)
AST SERPL-CCNC: 29 U/L (ref 5–40)
AST SERPL-CCNC: 29 U/L (ref 5–40)
AVERAGE GLUCOSE: 207 MG/DL (ref 70–126)
BILIRUB SERPL-MCNC: 0.5 MG/DL (ref 0.3–1.2)
BILIRUB SERPL-MCNC: 0.5 MG/DL (ref 0.3–1.2)
BILIRUBIN DIRECT: < 0.2 MG/DL (ref 0–0.3)
BUN BLDV-MCNC: 14 MG/DL (ref 7–22)
CALCIUM SERPL-MCNC: 10.1 MG/DL (ref 8.5–10.5)
CHLORIDE BLD-SCNC: 100 MEQ/L (ref 98–111)
CHOLESTEROL, TOTAL: 217 MG/DL (ref 100–199)
CO2: 26 MEQ/L (ref 23–33)
CREAT SERPL-MCNC: 0.9 MG/DL (ref 0.4–1.2)
ERYTHROCYTE [DISTWIDTH] IN BLOOD BY AUTOMATED COUNT: 13.1 % (ref 11.5–14.5)
ERYTHROCYTE [DISTWIDTH] IN BLOOD BY AUTOMATED COUNT: 44.3 FL (ref 35–45)
GFR SERPL CREATININE-BSD FRML MDRD: 63 ML/MIN/1.73M2
GLUCOSE BLD-MCNC: 214 MG/DL (ref 70–108)
HBA1C MFR BLD: 8.9 % (ref 4.4–6.4)
HCT VFR BLD CALC: 41.6 % (ref 37–47)
HDLC SERPL-MCNC: 58 MG/DL
HEMOGLOBIN: 12.8 GM/DL (ref 12–16)
LDL CHOLESTEROL CALCULATED: 123 MG/DL
MAGNESIUM: 1.8 MG/DL (ref 1.6–2.4)
MCH RBC QN AUTO: 28.3 PG (ref 26–33)
MCHC RBC AUTO-ENTMCNC: 30.8 GM/DL (ref 32.2–35.5)
MCV RBC AUTO: 91.8 FL (ref 81–99)
PLATELET # BLD: 303 THOU/MM3 (ref 130–400)
PMV BLD AUTO: 9.3 FL (ref 9.4–12.4)
POTASSIUM SERPL-SCNC: 5 MEQ/L (ref 3.5–5.2)
RBC # BLD: 4.53 MILL/MM3 (ref 4.2–5.4)
SODIUM BLD-SCNC: 138 MEQ/L (ref 135–145)
TOTAL PROTEIN: 7.5 G/DL (ref 6.1–8)
TOTAL PROTEIN: 7.7 G/DL (ref 6.1–8)
TRIGL SERPL-MCNC: 180 MG/DL (ref 0–199)
WBC # BLD: 5.5 THOU/MM3 (ref 4.8–10.8)

## 2021-08-05 PROCEDURE — 83036 HEMOGLOBIN GLYCOSYLATED A1C: CPT

## 2021-08-05 PROCEDURE — 83735 ASSAY OF MAGNESIUM: CPT

## 2021-08-05 PROCEDURE — 85027 COMPLETE CBC AUTOMATED: CPT

## 2021-08-05 PROCEDURE — 80061 LIPID PANEL: CPT

## 2021-08-05 PROCEDURE — 36415 COLL VENOUS BLD VENIPUNCTURE: CPT

## 2021-08-05 PROCEDURE — 80053 COMPREHEN METABOLIC PANEL: CPT

## 2021-08-09 NOTE — TELEPHONE ENCOUNTER
Per Dr. Carmencita Caldera, called patient to assess patient adherence to diabetes regimen.      -Lantus Solostar - filled #10 pens/90 DS 7/26/2021, 4/23/2021, 1/23/2021  -Humalog Kwikpen - filled #15 pens/90 DS 8/27/2020  -Zetia 10mg - filled 2/3/2021, 5/3/2021, 8/2/2021, 90 DS      Insulin: 5 injections daily  -Lantus 18 units AM/14 units HS   -Adherence: reports good adherence to Lantus; denies missed doses  -Humalog 1:7 carb ratio+ Group 1 SSI   -Injects after eating (in case of unfinished meals)   -Average: 5-10 units    -Adherence: forgets lunch Humalog most days of the week - uses alarm at lunchtime, but still forgets  Blood glucose checks: ordered to check blood sugar 4x daily (before meals for sliding scale & at bedtime)  -Attempting CGM coverage   -Karthik Rae experienced inaccuracy with Dexcom previously; however, given that she takes vitamin C 1000mg daily chronically, there may also be inaccuracy with the [place].     -Currently ineligible because prescription is for 3x daily BG checks; however, Karthik Rae would be willing to re-trial a CGM      B: Muscle protein drinks  L: Muscle drink or half sandwich or bowl of soup  D: Regular meal (sometimes eats only half) - 1-2 hamburger kimberli, salad  Snack: bedtime - piece of fruit, popcorn, avocado, beef jerky    Current Outpatient Medications   Medication Sig Dispense Refill    Lancets MISC Use to test blood sugars 4 times daily and PRN .  DX:E10.9 400 each 3    blood glucose monitor strips Test 4 times a day and as needed DX:E10.9 400 strip 3    insulin glargine (LANTUS SOLOSTAR) 100 UNIT/ML injection pen INJECT 18 UNITS IN THE MORNING AND 14 UNITS IN THE EVENING 15 pen 3    levothyroxine (SYNTHROID) 137 MCG tablet Take 1 tablet by mouth daily (Patient taking differently: Take 137 mcg by mouth daily 1 tablet daily and 1.5 tablet on sunday) 90 tablet 3    Glucagon, rDNA, (GLUCAGON EMERGENCY) 1 MG KIT Inject 1 kit as directed as needed (hypoglycemia) 1 kit 1    midodrine (PROAMATINE) 5 MG tablet Take 5 mg by mouth 3 times daily      Cholecalciferol (VITAMIN D3) 25 MCG (1000 UT) CAPS Take by mouth daily      Nutritional Supplements (ESTROVEN PO) Take by mouth daily      insulin lispro (HUMALOG KWIKPEN) 100 UNIT/ML pen INJECT 8 UNITS THREE TIMES A DAY BEFORE MEALS, PLUS SLIDING SCALE, MAX DOSE 48 UNITS PER DAY. (Patient taking differently: INJECT 7 UNITS THREE TIMES A DAY BEFORE MEALS, PLUS SLIDING SCALE, MAX DOSE 48 UNITS PER DAY. ) 45 mL 3    ezetimibe (ZETIA) 10 MG tablet Take 10 mg by mouth daily      vitamin E 400 UNIT capsule Take 400 Units by mouth daily      Multiple Vitamins-Minerals (HAIR SKIN AND NAILS FORMULA PO) Take 1 tablet by mouth daily      PARoxetine (PAXIL) 10 MG tablet Take 10 mg by mouth every morning      Bimatoprost (LUMIGAN OP) Place 1 drop into both eyes daily      Omega 3 1200 MG CAPS Take 1,200 capsules by mouth daily      brimonidine-timolol (COMBIGAN) 0.2-0.5 % ophthalmic solution Place 1 drop into both eyes 3 times daily       calcium carbonate (TUMS CALCIUM FOR LIFE BONE) 750 MG chewable tablet Take 1 tablet by mouth daily.  aspirin 81 MG tablet Take 81 mg by mouth daily.  therapeutic multivitamin-minerals (THERAGRAN-M) tablet Take 1 tablet by mouth daily.  Ascorbic Acid (VITAMIN C) 500 MG tablet Take 1,000 mg by mouth daily. No current facility-administered medications for this visit.          Recommendations:  -Pursue approval of CGM  -Stress importance of adherence to lunchtime Humalog and regular BG testing  -Patient consistently injects Humalog after meals (not willing to switch to before meals); consider switch to ultra rapid acting insulin (Lyumjev) if covered by insurance      Guero Lugo, PharmD, [facility]  Internal Medicine Clinical Pharmacist  [phone]

## 2021-08-10 RX ORDER — GLUCOSAMINE HCL/CHONDROITIN SU 500-400 MG
CAPSULE ORAL
Qty: 400 STRIP | Refills: 3 | Status: CANCELLED | OUTPATIENT
Start: 2021-08-10

## 2021-08-10 RX ORDER — BLOOD-GLUCOSE TRANSMITTER
EACH MISCELLANEOUS
Qty: 1 EACH | Refills: 3 | Status: CANCELLED | OUTPATIENT
Start: 2021-08-10

## 2021-08-10 RX ORDER — BLOOD-GLUCOSE SENSOR
EACH MISCELLANEOUS
Qty: 3 EACH | Refills: 3 | Status: CANCELLED | OUTPATIENT
Start: 2021-08-10

## 2021-08-10 RX ORDER — BLOOD-GLUCOSE,RECEIVER,CONT
EACH MISCELLANEOUS
Qty: 1 DEVICE | Refills: 0 | Status: CANCELLED | OUTPATIENT
Start: 2021-08-10

## 2021-08-11 RX ORDER — GLUCOSAMINE HCL/CHONDROITIN SU 500-400 MG
CAPSULE ORAL
Qty: 300 STRIP | Refills: 3
Start: 2021-08-11 | End: 2021-08-11 | Stop reason: SDUPTHER

## 2021-08-11 RX ORDER — GLUCOSAMINE HCL/CHONDROITIN SU 500-400 MG
CAPSULE ORAL
Qty: 400 STRIP | Refills: 3
Start: 2021-08-11 | End: 2021-09-17 | Stop reason: ALTCHOICE

## 2021-08-11 RX ORDER — LANCETS 30 GAUGE
EACH MISCELLANEOUS
Qty: 400 EACH | Refills: 3
Start: 2021-08-11 | End: 2022-10-20 | Stop reason: SDUPTHER

## 2021-08-19 NOTE — TELEPHONE ENCOUNTER
Insufficient documentation for approval.  Will send additional documentation.       Juwan Rodriguez, PharmD, [facility]  Internal Medicine Clinical Pharmacist  [phone]

## 2021-08-26 ENCOUNTER — TELEPHONE (OUTPATIENT)
Dept: INTERNAL MEDICINE CLINIC | Age: 66
End: 2021-08-26

## 2021-09-17 ENCOUNTER — OFFICE VISIT (OUTPATIENT)
Dept: INTERNAL MEDICINE CLINIC | Age: 66
End: 2021-09-17
Payer: COMMERCIAL

## 2021-09-17 VITALS
TEMPERATURE: 97.6 F | BODY MASS INDEX: 33.86 KG/M2 | SYSTOLIC BLOOD PRESSURE: 138 MMHG | HEART RATE: 80 BPM | DIASTOLIC BLOOD PRESSURE: 82 MMHG | WEIGHT: 229.3 LBS

## 2021-09-17 DIAGNOSIS — E10.65 TYPE 1 DIABETES MELLITUS WITH HYPERGLYCEMIA (HCC): ICD-10-CM

## 2021-09-17 PROCEDURE — 3052F HG A1C>EQUAL 8.0%<EQUAL 9.0%: CPT | Performed by: INTERNAL MEDICINE

## 2021-09-17 PROCEDURE — 99213 OFFICE O/P EST LOW 20 MIN: CPT | Performed by: INTERNAL MEDICINE

## 2021-09-17 RX ORDER — DICLOFENAC SODIUM 75 MG/1
TABLET, DELAYED RELEASE ORAL
COMMUNITY
Start: 2021-08-16

## 2021-09-17 RX ORDER — GLUCOSAMINE HCL/CHONDROITIN SU 500-400 MG
CAPSULE ORAL
Qty: 400 STRIP | Refills: 2 | Status: SHIPPED | OUTPATIENT
Start: 2021-09-17 | End: 2021-11-29 | Stop reason: SDUPTHER

## 2021-09-17 RX ORDER — INSULIN GLARGINE 100 [IU]/ML
INJECTION, SOLUTION SUBCUTANEOUS
Qty: 15 PEN | Refills: 3 | Status: SHIPPED
Start: 2021-09-17 | End: 2021-11-22

## 2021-09-17 NOTE — PATIENT INSTRUCTIONS
Patient Education        Learning About Carbohydrate (Carb) Counting and Eating Out When You Have Diabetes  Why plan your meals? Meal planning can be a key part of managing diabetes. Planning meals and snacks with the right balance of carbohydrate, protein, and fat can help you keep your blood sugar at the target level you set with your doctor. You don't have to eat special foods. You can eat what your family eats, including sweets once in a while. But you do have to pay attention to how often you eat and how much you eat of certain foods. You may want to work with a dietitian or a certified diabetes educator. He or she can give you tips and meal ideas and can answer your questions about meal planning. This health professional can also help you reach a healthy weight if that is one of your goals. What should you know about eating carbs? Managing the amount of carbohydrate (carbs) you eat is an important part of healthy meals when you have diabetes. Carbohydrate is found in many foods. · Learn which foods have carbs. And learn the amounts of carbs in different foods. ? Bread, cereal, pasta, and rice have about 15 grams of carbs in a serving. A serving is 1 slice of bread (1 ounce), ½ cup of cooked cereal, or 1/3 cup of cooked pasta or rice. ? Fruits have 15 grams of carbs in a serving. A serving is 1 small fresh fruit, such as an apple or orange; ½ of a banana; ½ cup of cooked or canned fruit; ½ cup of fruit juice; 1 cup of melon or raspberries; or 2 tablespoons of dried fruit. ? Milk and no-sugar-added yogurt have 15 grams of carbs in a serving. A serving is 1 cup of milk or 2/3 cup of no-sugar-added yogurt. ? Starchy vegetables have 15 grams of carbs in a serving. A serving is ½ cup of mashed potatoes or sweet potato; 1 cup winter squash; ½ of a small baked potato; ½ cup of cooked beans; or ½ cup cooked corn or green peas.   · Learn how much carbs to eat each day and at each meal. A dietitian or CDE can is a healthy choice because people who have diabetes are at higher risk of heart disease. So choose lean cuts of meat and nonfat or low-fat dairy products. Use olive or canola oil instead of butter or shortening when cooking. · Don't skip meals. Your blood sugar may drop too low if you skip meals and take insulin or certain medicines for diabetes. · Check with your doctor before you drink alcohol. Alcohol can cause your blood sugar to drop too low. Alcohol can also cause a bad reaction if you take certain diabetes medicines. Follow-up care is a key part of your treatment and safety. Be sure to make and go to all appointments, and call your doctor if you are having problems. It's also a good idea to know your test results and keep a list of the medicines you take. Where can you learn more? Go to https://[employer]eb.[employer]. org and sign in to your [employer] account. Enter B171 in the [employer] box to learn more about \"Learning About Carbohydrate (Carb) Counting and Eating Out When You Have Diabetes. \"     If you do not have an account, please click on the \"Sign Up Now\" link. Current as of: August 31, 2020               Content Version: 12.9  © 1500-9144 Healthwise, Incorporated. Care instructions adapted under license by [facility] ([facility]). If you have questions about a medical condition or this instruction, always ask your healthcare professional. Dmitry Chapin any warranty or liability for your use of this information.

## 2021-09-17 NOTE — PROGRESS NOTES
Called to obtain Eye Exam records from [address] [facility]. Their fax machine is down but will fax us the record as soon as they are able. Last eye exam was 4/26/21. Sees Dr. Stephanie Olguin for Podiatry. Their office is to fax over most recent foot exam per Jhonathan Arechiga @ Dr. Stephanie Olguin office.

## 2021-09-17 NOTE — PROGRESS NOTES
[address] [facility] Internal Medicine   [facility] [address]  [address] [facility], [address] [facility]  Dept: [phone]  Dept Fax: [phone]    YOB: 1955    Type I diabetes mellitus/dyslipidemia    61 y.o. female   here for follow-up of above issues. And does see Ralph Arambula for medical issues here in town were following up mainly of the diabetes and the dyslipidemia. This office visit today. She has  thyroidectomy and now has hypothyroidism for which she is taking supplements. Diagnosed with DM back on 2000, strong family hx of DM. She is an obese WF not in distress, she does have retinopathy , followed by ophthalmology Q 6 months. No CAD, seen dr. Rose Jorge. Taking midodrine 5 mg TID for that, due to ortho static hypotension. A1c was 10 in the past, last 8.9  From 8/5/21  Got the covid vaccine. She does have chronic hip pain bilaterally. She is checking sugars 4 times per day, claims she was tested several times, by several Physicians,  Including dr. Fe Parkinson who diagnosed her with type 1 DM-2   Pt claims she gets low sugars at night . Apparently she has been having problems, with her insurance co   For CGM. Claims she gets diabetic supplies from   [facility]. Has seen dr. Vivian Gomez 6/1/121     Current Outpatient Medications   Medication Sig Dispense Refill    insulin glargine (LANTUS SOLOSTAR) 100 UNIT/ML injection pen INJECT 20 UNITS IN THE MORNING AND 14 UNITS IN THE EVENING 15 pen 3    diclofenac (VOLTAREN) 75 MG EC tablet       Lancets MISC Use to test blood sugars 4 times daily and PRN .  DX:E10.9 400 each 3    blood glucose monitor strips Test 4 times a day and as needed DX:E10.9 400 strip 3    levothyroxine (SYNTHROID) 137 MCG tablet Take 1 tablet by mouth daily (Patient taking differently: Take 137 mcg by mouth daily 1 tablet daily and 1.5 tablet on sunday) 90 tablet 3    Glucagon, rDNA, (GLUCAGON EMERGENCY) 1 MG KIT Inject 1 kit as directed as needed (hypoglycemia) 1 kit 1    midodrine (PROAMATINE) 5 MG tablet Take 5 mg by mouth 3 times daily      Cholecalciferol (VITAMIN D3) 25 MCG (1000 UT) CAPS Take by mouth daily      Nutritional Supplements (ESTROVEN PO) Take by mouth daily      insulin lispro (HUMALOG KWIKPEN) 100 UNIT/ML pen INJECT 8 UNITS THREE TIMES A DAY BEFORE MEALS, PLUS SLIDING SCALE, MAX DOSE 48 UNITS PER DAY. (Patient taking differently: INJECT 7 UNITS THREE TIMES A DAY BEFORE MEALS, PLUS SLIDING SCALE, MAX DOSE 48 UNITS PER DAY. ) 45 mL 3    ezetimibe (ZETIA) 10 MG tablet Take 10 mg by mouth daily      vitamin E 400 UNIT capsule Take 400 Units by mouth daily      Multiple Vitamins-Minerals (HAIR SKIN AND NAILS FORMULA PO) Take 1 tablet by mouth daily      PARoxetine (PAXIL) 10 MG tablet Take 10 mg by mouth every morning      Bimatoprost (LUMIGAN OP) Place 1 drop into both eyes daily      Omega 3 1200 MG CAPS Take 1,200 capsules by mouth daily      brimonidine-timolol (COMBIGAN) 0.2-0.5 % ophthalmic solution Place 1 drop into both eyes 3 times daily       calcium carbonate (TUMS CALCIUM FOR LIFE BONE) 750 MG chewable tablet Take 1 tablet by mouth daily.  aspirin 81 MG tablet Take 81 mg by mouth daily.  therapeutic multivitamin-minerals (THERAGRAN-M) tablet Take 1 tablet by mouth daily.  Ascorbic Acid (VITAMIN C) 500 MG tablet Take 1,000 mg by mouth daily. No current facility-administered medications for this visit.        Past Medical History:   Diagnosis Date    Depression     Fibromyalgia     Glaucoma     Hyperthyroidism     Meningitis 10-    viral     Meningitis spinal 10/04/2013    Viral    Osteoarthritis     Type I (juvenile type) diabetes mellitus without mention of complication, not stated as uncontrolled 2000       Social History     Socioeconomic History    Marital status:      Spouse name: Not on file    Number of children: Not on file    Years of education: Not on file    Highest education level: Not on file Occupational History    Occupation: [employer]   Tobacco Use    Smoking status: Never Smoker    Smokeless tobacco: Never Used   Substance and Sexual Activity    Alcohol use: No     Alcohol/week: 0.0 standard drinks     Comment: rarely    Drug use: No    Sexual activity: Not on file   Other Topics Concern    Not on file   Social History Narrative    Not on file     Social Determinants of Health     Financial Resource Strain: Low Risk     Difficulty of Paying Living Expenses: Not very hard   Food Insecurity: No Food Insecurity    Worried About Running Out of Food in the Last Year: Never true    Katty of Food in the Last Year: Never true   Transportation Needs: No Transportation Needs    Lack of Transportation (Medical): No    Lack of Transportation (Non-Medical):  No   Physical Activity:     Days of Exercise per Week:     Minutes of Exercise per Session:    Stress:     Feeling of Stress :    Social Connections:     Frequency of Communication with Friends and Family:     Frequency of Social Gatherings with Friends and Family:     Attends Spiritism Services:     Active Member of Clubs or Organizations:     Attends Club or Organization Meetings:     Marital Status:    Intimate Partner Violence:     Fear of Current or Ex-Partner:     Emotionally Abused:     Physically Abused:     Sexually Abused:        Family History   Problem Relation Age of Onset    Thyroid Disease Mother     High Cholesterol Mother     Diabetes Father     Other Father         arachnoid cyst    Other Brother         arachnoid cyst       Allergies   Allergen Reactions    Milk-Related Compounds Other (See Comments)     Severe constipation    Statins      myalgia    Pcn [Penicillins] Rash       Review of Systems     See HPI     /82 (Site: Left Upper Arm, Position: Sitting)   Pulse 80   Temp 97.6 °F (36.4 °C)   Wt 229 lb 4.8 oz (104 kg)   LMP 01/21/2013   BMI 33.86 kg/m²      Physical Examination: General appearance - patient alert, well appearing, and in no distress, she is obese WF . Mental status - alert and oriented    Neck - supple, no significant adenopathy  Chest - clear to auscultation, no wheezes, rales or rhonchi, symmetric air entry  Heart - normal rate, regular rhythm, normal S1, S2, no murmurs, rubs, clicks or gallops  Abdomen - soft, nontender, nondistended, no masses or organomegaly  no abdominal bruits. Obese Protuberant: No  Neurological - alert, oriented, normal speech, no focal findings or movement disorder noted, motor and sensory grossly normal bilaterally  Musculoskeletal - no joint tenderness, deformity or swelling  Extremities - peripheral pulses normal, no pedal edema, no clubbing or cyanosis, Tess's sign negative bilaterally  Prosthesis: No  Skin - normal coloration and turgor, no rashes, no suspicious skin lesions noted      I have reviewed recent diagnostic testing including labs,     No orders of the defined types were placed in this encounter. Outpatient Encounter Medications as of 9/17/2021   Medication Sig Dispense Refill    insulin glargine (LANTUS SOLOSTAR) 100 UNIT/ML injection pen INJECT 20 UNITS IN THE MORNING AND 14 UNITS IN THE EVENING 15 pen 3    diclofenac (VOLTAREN) 75 MG EC tablet       Lancets MISC Use to test blood sugars 4 times daily and PRN .  DX:E10.9 400 each 3    blood glucose monitor strips Test 4 times a day and as needed DX:E10.9 400 strip 3    [DISCONTINUED] insulin glargine (LANTUS SOLOSTAR) 100 UNIT/ML injection pen INJECT 18 UNITS IN THE MORNING AND 14 UNITS IN THE EVENING 15 pen 3    levothyroxine (SYNTHROID) 137 MCG tablet Take 1 tablet by mouth daily (Patient taking differently: Take 137 mcg by mouth daily 1 tablet daily and 1.5 tablet on sunday) 90 tablet 3    Glucagon, rDNA, (GLUCAGON EMERGENCY) 1 MG KIT Inject 1 kit as directed as needed (hypoglycemia) 1 kit 1    midodrine (PROAMATINE) 5 MG tablet Take 5 mg by mouth 3 times daily  Cholecalciferol (VITAMIN D3) 25 MCG (1000 UT) CAPS Take by mouth daily      Nutritional Supplements (ESTROVEN PO) Take by mouth daily      insulin lispro (HUMALOG KWIKPEN) 100 UNIT/ML pen INJECT 8 UNITS THREE TIMES A DAY BEFORE MEALS, PLUS SLIDING SCALE, MAX DOSE 48 UNITS PER DAY. (Patient taking differently: INJECT 7 UNITS THREE TIMES A DAY BEFORE MEALS, PLUS SLIDING SCALE, MAX DOSE 48 UNITS PER DAY. ) 45 mL 3    ezetimibe (ZETIA) 10 MG tablet Take 10 mg by mouth daily      vitamin E 400 UNIT capsule Take 400 Units by mouth daily      Multiple Vitamins-Minerals (HAIR SKIN AND NAILS FORMULA PO) Take 1 tablet by mouth daily      PARoxetine (PAXIL) 10 MG tablet Take 10 mg by mouth every morning      Bimatoprost (LUMIGAN OP) Place 1 drop into both eyes daily      Omega 3 1200 MG CAPS Take 1,200 capsules by mouth daily      brimonidine-timolol (COMBIGAN) 0.2-0.5 % ophthalmic solution Place 1 drop into both eyes 3 times daily       calcium carbonate (TUMS CALCIUM FOR LIFE BONE) 750 MG chewable tablet Take 1 tablet by mouth daily.  aspirin 81 MG tablet Take 81 mg by mouth daily.  therapeutic multivitamin-minerals (THERAGRAN-M) tablet Take 1 tablet by mouth daily.  Ascorbic Acid (VITAMIN C) 500 MG tablet Take 1,000 mg by mouth daily. No facility-administered encounter medications on file as of 9/17/2021. Lab Results   Component Value Date     08/05/2021    K 5.0 08/05/2021     08/05/2021    CO2 26 08/05/2021    BUN 14 08/05/2021    CREATININE 0.9 08/05/2021    GLUCOSE 214 08/05/2021    GLUCOSE 230 10/26/2015    CALCIUM 10.1 08/05/2021         Controlled Substances Monitoring: Will schedule follow up appointment in 4 months. Plan:    Type -1 DM - taking humalog and lantus today's A1c was high , and needs to take 5 insulin injections /day.   But also had low sugars, we will give her a new gluconometer, needs to check sugars 4 times / day   so she will be a good candidate for CGM, so we will check . We will give her a new meter, and send it to [facility]. And change sliding scale in the day time  To group -2 , and Group 1 night time, and increase the am lantus by 2 units , and encouraged her to be careful with her  Diet. She claims has dietary guidelines. Dyslipidemia - can't tolerate STATINS, on zetia ,  Recent labs noted. LDL is 123, and TC  217,        Hypothyroidism - s/p thyroidectomy , due to nodule, no cancer. On supplements, with normal TSH so no change in thryoid meds. Obesity dietary modification lifestyle changes have been recommended, she is seen a dietitian in the past on her insurance company does not pay for any further visits. Signed by: Eliel Schwartz M.D.     [address] [facility] Internal Medicine  [address]  [facility], [address]    Tel  [phone]   Fax [phone]

## 2021-09-20 DIAGNOSIS — E10.9 T1DM (TYPE 1 DIABETES MELLITUS) (HCC): ICD-10-CM

## 2021-09-20 NOTE — TELEPHONE ENCOUNTER
Lindwood Bumpers called requesting a refill on the following medications:  Requested Prescriptions     Pending Prescriptions Disp Refills    insulin lispro (HUMALOG) 100 UNIT/ML injection vial 5 pen 5     Sig: Inject 8-9 Units into the skin 3 times daily (before meals)     Pharmacy verified: [facility]   . [email or web address]      Date of last visit: 9/17/2021  Date of next visit (if applicable): 35/10/5717

## 2021-09-22 RX ORDER — INSULIN LISPRO 100 [IU]/ML
INJECTION, SOLUTION INTRAVENOUS; SUBCUTANEOUS
Qty: 15 PEN | Refills: 1 | Status: SHIPPED | OUTPATIENT
Start: 2021-09-22 | End: 2021-11-22

## 2021-11-22 ENCOUNTER — OFFICE VISIT (OUTPATIENT)
Dept: INTERNAL MEDICINE CLINIC | Age: 66
End: 2021-11-22
Payer: COMMERCIAL

## 2021-11-22 VITALS
DIASTOLIC BLOOD PRESSURE: 86 MMHG | HEART RATE: 76 BPM | SYSTOLIC BLOOD PRESSURE: 154 MMHG | TEMPERATURE: 97.1 F | BODY MASS INDEX: 33.58 KG/M2 | WEIGHT: 227.4 LBS

## 2021-11-22 DIAGNOSIS — E10.65 TYPE 1 DIABETES MELLITUS WITH HYPERGLYCEMIA (HCC): Primary | ICD-10-CM

## 2021-11-22 LAB — HBA1C MFR BLD: 8.5 % (ref 4.3–5.7)

## 2021-11-22 PROCEDURE — 83036 HEMOGLOBIN GLYCOSYLATED A1C: CPT | Performed by: INTERNAL MEDICINE

## 2021-11-22 PROCEDURE — 3052F HG A1C>EQUAL 8.0%<EQUAL 9.0%: CPT | Performed by: INTERNAL MEDICINE

## 2021-11-22 PROCEDURE — 99214 OFFICE O/P EST MOD 30 MIN: CPT | Performed by: INTERNAL MEDICINE

## 2021-11-22 RX ORDER — INSULIN GLARGINE 100 [IU]/ML
INJECTION, SOLUTION SUBCUTANEOUS
Qty: 15 PEN | Refills: 3 | Status: SHIPPED | OUTPATIENT
Start: 2021-11-22 | End: 2021-11-29 | Stop reason: SDUPTHER

## 2021-11-22 RX ORDER — INSULIN LISPRO 100 [IU]/ML
INJECTION, SOLUTION INTRAVENOUS; SUBCUTANEOUS
Qty: 15 PEN | Refills: 1 | Status: SHIPPED | OUTPATIENT
Start: 2021-11-22 | End: 2022-02-28 | Stop reason: SDUPTHER

## 2021-11-22 NOTE — PROGRESS NOTES
[address] [facility] Internal Medicine   [facility] [address], [address]  Dept: [phone]  Dept Fax: [phone]    YOB: 1955    Type I diabetes mellitus/dyslipidemia    61 y.o. female   here for follow-up of above issues. And does see Luis Arzate for medical issues here in town were following up mainly of the diabetes and the dyslipidemia. This office visit today. She has  thyroidectomy and now has hypothyroidism for which she is taking supplements. Diagnosed with DM back on 2000, strong family hx of DM. She is an obese WF not in distress, she does have retinopathy , followed by ophthalmology Q 6 months. No CAD, seen dr. Carlos Alberto Thibodeaux. Her  A1c was 10 iand 8.9 in the past  ,  today its 8.5  Got the covid vaccine. She does have chronic hip pain bilaterally. No recent hospitalization. Pt admits at times she   Forgets her insulin, claims she is under a lot of stress due to family issues, both parents and in laws stay with her. Last TSH and T4 are normal back on 4/21     Has seen dr. Jojo Goodrich 6/1/121     Current Outpatient Medications   Medication Sig Dispense Refill    insulin lispro, 1 Unit Dial, (HUMALOG KWIKPEN) 100 UNIT/ML SOPN 8 units a day plus sliding scale. Max daily dose 48 units. 15 pen 1    diclofenac (VOLTAREN) 75 MG EC tablet       insulin glargine (LANTUS SOLOSTAR) 100 UNIT/ML injection pen INJECT 20 UNITS IN THE MORNING AND 14 UNITS IN THE EVENING 15 pen 3    blood glucose monitor kit and supplies Dispense sufficient amount for indicated testing frequency plus additional to accommodate PRN testing needs. Dispense all needed supplies to include: monitor, strips, lancing device, lancets, control solutions, alcohol swabs. One Touch or Accucheck meter. E11.9 1 kit 0    blood glucose monitor strips Test 4 times a day & as needed for symptoms of irregular blood glucose.  Dispense sufficient amount for indicated testing frequency plus additional to accommodate PRN testing needs. OneTouch or Accucheck test strips. E11.9 400 strip 2    Lancets MISC Use to test blood sugars 4 times daily and PRN . DX:E10.9 400 each 3    levothyroxine (SYNTHROID) 137 MCG tablet Take 1 tablet by mouth daily (Patient taking differently: Take 137 mcg by mouth daily 1 tablet daily and 1.5 tablet on sunday) 90 tablet 3    Glucagon, rDNA, (GLUCAGON EMERGENCY) 1 MG KIT Inject 1 kit as directed as needed (hypoglycemia) 1 kit 1    midodrine (PROAMATINE) 5 MG tablet Take 5 mg by mouth 3 times daily      Cholecalciferol (VITAMIN D3) 25 MCG (1000 UT) CAPS Take by mouth daily      Nutritional Supplements (ESTROVEN PO) Take by mouth daily      ezetimibe (ZETIA) 10 MG tablet Take 10 mg by mouth daily      vitamin E 400 UNIT capsule Take 400 Units by mouth daily      Multiple Vitamins-Minerals (HAIR SKIN AND NAILS FORMULA PO) Take 1 tablet by mouth daily      PARoxetine (PAXIL) 10 MG tablet Take 10 mg by mouth every morning      Bimatoprost (LUMIGAN OP) Place 1 drop into both eyes daily      Omega 3 1200 MG CAPS Take 1,200 capsules by mouth daily      brimonidine-timolol (COMBIGAN) 0.2-0.5 % ophthalmic solution Place 1 drop into both eyes 3 times daily       calcium carbonate (TUMS CALCIUM FOR LIFE BONE) 750 MG chewable tablet Take 1 tablet by mouth daily.  aspirin 81 MG tablet Take 81 mg by mouth daily.  therapeutic multivitamin-minerals (THERAGRAN-M) tablet Take 1 tablet by mouth daily.  Ascorbic Acid (VITAMIN C) 500 MG tablet Take 1,000 mg by mouth daily. No current facility-administered medications for this visit.        Past Medical History:   Diagnosis Date    Depression     Fibromyalgia     Glaucoma     Hyperthyroidism     Meningitis 10-    viral     Meningitis spinal 10/04/2013    Viral    Osteoarthritis     Type I (juvenile type) diabetes mellitus without mention of complication, not stated as uncontrolled 2000       Social History     Socioeconomic History    Marital status:      Spouse name: Not on file    Number of children: Not on file    Years of education: Not on file    Highest education level: Not on file   Occupational History    Occupation: [employer]   Tobacco Use    Smoking status: Never Smoker    Smokeless tobacco: Never Used   Substance and Sexual Activity    Alcohol use: No     Alcohol/week: 0.0 standard drinks     Comment: rarely    Drug use: No    Sexual activity: Not on file   Other Topics Concern    Not on file   Social History Narrative    Not on file     Social Determinants of Health     Financial Resource Strain: Low Risk     Difficulty of Paying Living Expenses: Not very hard   Food Insecurity: No Food Insecurity    Worried About Running Out of Food in the Last Year: Never true    Katty of Food in the Last Year: Never true   Transportation Needs: No Transportation Needs    Lack of Transportation (Medical): No    Lack of Transportation (Non-Medical):  No   Physical Activity:     Days of Exercise per Week: Not on file    Minutes of Exercise per Session: Not on file   Stress:     Feeling of Stress : Not on file   Social Connections:     Frequency of Communication with Friends and Family: Not on file    Frequency of Social Gatherings with Friends and Family: Not on file    Attends Hoahaoism Services: Not on file    Active Member of [address] [place] [address] [employer] or Organizations: Not on file    Attends Club or Organization Meetings: Not on file    Marital Status: Not on file   Intimate Partner Violence:     Fear of Current or Ex-Partner: Not on file    Emotionally Abused: Not on file    Physically Abused: Not on file    Sexually Abused: Not on file   Housing Stability:     Unable to Pay for Housing in the Last Year: Not on file    Number of [place] in the Last Year: Not on file    Unstable Housing in the Last Year: Not on file       Family History   Problem Relation Age of Onset    Thyroid Disease Mother    Rodri Garduno High Cholesterol Mother     Diabetes Father     Other Father         arachnoid cyst    Other Brother         arachnoid cyst       Allergies   Allergen Reactions    Milk-Related Compounds Other (See Comments)     Severe constipation    Statins      myalgia    Pcn [Penicillins] Rash       Review of Systems     See HPI     BP (!) 154/86 (Site: Left Upper Arm)   Pulse 76   Temp 97.1 °F (36.2 °C)   Wt 227 lb 6.4 oz (103.1 kg)   LMP 01/21/2013   BMI 33.58 kg/m²      Physical Examination: General appearance - patient alert, well appearing, and in no distress, she is obese WF . Mental status - alert and oriented    Neck - supple, no significant adenopathy  Chest - clear to auscultation, no wheezes, rales or rhonchi, symmetric air entry  Heart - normal rate, regular rhythm, normal S1, S2, no murmurs, rubs, clicks or gallops  Abdomen - soft, nontender, nondistended, no masses or organomegaly  no abdominal bruits. Obese Protuberant: No  Neurological - alert, oriented, normal speech, no focal findings or movement disorder noted, motor and sensory grossly normal bilaterally  Musculoskeletal - no joint tenderness, deformity or swelling  Extremities - peripheral pulses normal, no pedal edema, no clubbing or cyanosis, Tess's sign negative bilaterally  Prosthesis: No  Skin - normal coloration and turgor, no rashes, no suspicious skin lesions noted      I have reviewed recent diagnostic testing including labs,     Orders Placed This Encounter   Procedures    POCT glycosylated hemoglobin (Hb A1C)    07230 - Collection Capillary Blood Specimen       Outpatient Encounter Medications as of 11/22/2021   Medication Sig Dispense Refill    insulin lispro, 1 Unit Dial, (HUMALOG KWIKPEN) 100 UNIT/ML SOPN 8 units a day plus sliding scale. Max daily dose 48 units.  15 pen 1    diclofenac (VOLTAREN) 75 MG EC tablet       insulin glargine (LANTUS SOLOSTAR) 100 UNIT/ML injection pen INJECT 20 UNITS IN THE MORNING AND 14 UNITS IN THE EVENING 15 pen 3    blood glucose monitor kit and supplies Dispense sufficient amount for indicated testing frequency plus additional to accommodate PRN testing needs. Dispense all needed supplies to include: monitor, strips, lancing device, lancets, control solutions, alcohol swabs. One Touch or Accucheck meter. E11.9 1 kit 0    blood glucose monitor strips Test 4 times a day & as needed for symptoms of irregular blood glucose. Dispense sufficient amount for indicated testing frequency plus additional to accommodate PRN testing needs. OneTouch or Accucheck test strips. E11.9 400 strip 2    Lancets MISC Use to test blood sugars 4 times daily and PRN . DX:E10.9 400 each 3    levothyroxine (SYNTHROID) 137 MCG tablet Take 1 tablet by mouth daily (Patient taking differently: Take 137 mcg by mouth daily 1 tablet daily and 1.5 tablet on sunday) 90 tablet 3    Glucagon, rDNA, (GLUCAGON EMERGENCY) 1 MG KIT Inject 1 kit as directed as needed (hypoglycemia) 1 kit 1    midodrine (PROAMATINE) 5 MG tablet Take 5 mg by mouth 3 times daily      Cholecalciferol (VITAMIN D3) 25 MCG (1000 UT) CAPS Take by mouth daily      Nutritional Supplements (ESTROVEN PO) Take by mouth daily      ezetimibe (ZETIA) 10 MG tablet Take 10 mg by mouth daily      vitamin E 400 UNIT capsule Take 400 Units by mouth daily      Multiple Vitamins-Minerals (HAIR SKIN AND NAILS FORMULA PO) Take 1 tablet by mouth daily      PARoxetine (PAXIL) 10 MG tablet Take 10 mg by mouth every morning      Bimatoprost (LUMIGAN OP) Place 1 drop into both eyes daily      Omega 3 1200 MG CAPS Take 1,200 capsules by mouth daily      brimonidine-timolol (COMBIGAN) 0.2-0.5 % ophthalmic solution Place 1 drop into both eyes 3 times daily       calcium carbonate (TUMS CALCIUM FOR LIFE BONE) 750 MG chewable tablet Take 1 tablet by mouth daily.  aspirin 81 MG tablet Take 81 mg by mouth daily.       therapeutic multivitamin-minerals (THERAGRAN-M) tablet Take 1 tablet by mouth daily.  Ascorbic Acid (VITAMIN C) 500 MG tablet Take 1,000 mg by mouth daily. No facility-administered encounter medications on file as of 11/22/2021. Lab Results   Component Value Date     08/05/2021    K 5.0 08/05/2021     08/05/2021    CO2 26 08/05/2021    BUN 14 08/05/2021    CREATININE 0.9 08/05/2021    GLUCOSE 214 08/05/2021    GLUCOSE 230 10/26/2015    CALCIUM 10.1 08/05/2021         Controlled Substances Monitoring: Will schedule follow up appointment in 4 months. Plan:    Type -1 DM - taking humalog and lantus today's A1c was high and needs to take 5 insulin injections /day. We need her to get a CGM, so we need her to bring in the logs. IN the mean time increase the lantus to novolog to 10 Units + SS 2 pre meals, and increase the Lantus to 22 U in am , 17U PM. New scripts have been sent out. Pt claims her insurance does not cover the diabetic clinic. I will d/w [facility] , who will contact her   In 3-4 weeks. We will send a new script to [facility] , for a new meter . , as we are not able to down load her current meter. Dyslipidemia - can't tolerate STATINS, on zetia ,  Recent labs noted. LDL is 123, and TC  217,        Hypothyroidism - s/p thyroidectomy , due to nodule, no cancer. On supplements, with normal TSH so no change in thryoid meds. Obesity dietary modification lifestyle changes have been recommended, she is seen a dietitian in the past claims her insurance company does not pay for any further visits. RTO 3 months. Signed by: Linnea Carlisle M.D.     [address] [facility] Internal Medicine  [address] [facility], [facility] [address], [address]    Tel  [phone]   Fax [phone]

## 2021-11-23 DIAGNOSIS — E89.0 STATUS POST TOTAL THYROIDECTOMY: ICD-10-CM

## 2021-11-23 RX ORDER — LEVOTHYROXINE SODIUM 137 UG/1
137 TABLET ORAL DAILY
Qty: 96 TABLET | Refills: 2 | Status: SHIPPED | OUTPATIENT
Start: 2021-11-23 | End: 2022-10-20 | Stop reason: SDUPTHER

## 2021-11-29 ENCOUNTER — TELEPHONE (OUTPATIENT)
Dept: INTERNAL MEDICINE CLINIC | Age: 66
End: 2021-11-29

## 2021-11-29 DIAGNOSIS — E10.65 TYPE 1 DIABETES MELLITUS WITH HYPERGLYCEMIA (HCC): ICD-10-CM

## 2021-11-29 DIAGNOSIS — E16.2 HYPOGLYCEMIA: ICD-10-CM

## 2021-11-29 RX ORDER — INSULIN GLARGINE 100 [IU]/ML
INJECTION, SOLUTION SUBCUTANEOUS
Qty: 15 PEN | Refills: 3 | Status: SHIPPED | OUTPATIENT
Start: 2021-11-29 | End: 2022-10-20 | Stop reason: SDUPTHER

## 2021-11-29 RX ORDER — GLUCOSAMINE HCL/CHONDROITIN SU 500-400 MG
CAPSULE ORAL
Qty: 400 STRIP | Refills: 2 | Status: SHIPPED | OUTPATIENT
Start: 2021-11-29

## 2021-11-29 RX ORDER — IBUPROFEN 600 MG/1
1 TABLET ORAL PRN
Qty: 1 KIT | Refills: 1 | Status: SHIPPED | OUTPATIENT
Start: 2021-11-29 | End: 2022-02-28

## 2021-11-29 RX ORDER — GLUCAGON 3 MG/1
POWDER NASAL
Qty: 1 EACH | Refills: 0 | Status: SHIPPED | OUTPATIENT
Start: 2021-11-29

## 2021-11-29 NOTE — TELEPHONE ENCOUNTER
Called Valery Almodovar to clarify blood sugars called in this Gloria Gilliland has been waking up with symptomatic lows (46s) 3-4x/week. Valery Almodovar had a hypoglycemic episode overnight on 11/27. Her  found her convulsing, called the squad, and administered glucagon. Squad found her BG to be 23 and administered IV dextrose. Valery Almodovar reports that she was not brought to the ED after this episode. This is her 3rd episode of hypoglycemia requiring emergency management. After the low, Valery Almodovar decreased her lantus from 22 units AM/17 units PM to 18 units AM/14 units PM. She still woke up in the 50s the two following nights. After discussion with Dr. Kashif Daniels, will adjust her insulin to 20 units AM, 10 units PM and follow-up visit in 2.5 weeks. Will send Rxs for Baqsimi and glucacon kit.  We will continue to pursue CGM approval.    For Pharmacy Admin Tracking Only   CPA in place:  No   Intervention Detail: Dose Adjustment: 1, reason: Therapy Optimization and Refill(s) Provided   Total # of Interventions Recommended: 2   Total # of Interventions Accepted: 2   Time Spent (min): Juaquin Gonzalez) Deja Crenshaw, PharmD, [facility]  Internal Medicine Clinical Pharmacist  [phone]

## 2021-12-14 ENCOUNTER — OFFICE VISIT (OUTPATIENT)
Dept: INTERNAL MEDICINE CLINIC | Age: 66
End: 2021-12-14
Payer: COMMERCIAL

## 2021-12-14 VITALS
SYSTOLIC BLOOD PRESSURE: 138 MMHG | HEART RATE: 98 BPM | WEIGHT: 226 LBS | TEMPERATURE: 97.6 F | DIASTOLIC BLOOD PRESSURE: 80 MMHG | BODY MASS INDEX: 33.37 KG/M2

## 2021-12-14 DIAGNOSIS — E10.8 DM (DIABETES MELLITUS), TYPE 1 WITH COMPLICATIONS (HCC): Primary | ICD-10-CM

## 2021-12-14 DIAGNOSIS — I10 ESSENTIAL HYPERTENSION: ICD-10-CM

## 2021-12-14 PROCEDURE — 3052F HG A1C>EQUAL 8.0%<EQUAL 9.0%: CPT | Performed by: INTERNAL MEDICINE

## 2021-12-14 PROCEDURE — 99213 OFFICE O/P EST LOW 20 MIN: CPT | Performed by: INTERNAL MEDICINE

## 2021-12-14 NOTE — PROGRESS NOTES
[address] [facility] Internal Medicine   [facility] [address], [address] [facility]  Dept: [phone]  Dept Fax: [phone]    YOB: 1955    Type I diabetes mellitus/dyslipidemia    61 y.o. female   here for follow-up of above issues. And does see Michele Oropeza for medical issues here in town were following up mainly of the diabetes and the dyslipidemia. This office visit today. She has  thyroidectomy and now has hypothyroidism for which she is taking supplements. Diagnosed with DM back on 2000, strong family hx of DM. She is an obese WF not in distress, she does have retinopathy , followed by ophthalmology Q 6 months. No CAD, seen dr. Tsering Hansen. Her  A1c was 10 iand 8.9 in the past , it was  8.5 11/22/21 ,  Got the covid vaccine along the booster,    She does have chronic hip pain bilaterally. No recent hospitalization. Pt admits at times she   Forgets her insulin, claims she is under a lot of stress due to family issues, both parents and in laws stay with her. She had   Hypoglycemia of 23 at 130 am , had \" convulsions \"  Per spouse who gave her epi shot, and EMS arrived gave IVF  And sugars are better and did not go to the ER . Since then I have cut down the night lantus, and did not have further low sugars. I have reviewed her log, claims she is eating ok. Has seen dr. Annalee Pritchett 6/1/121     Current Outpatient Medications   Medication Sig Dispense Refill    blood glucose monitor kit and supplies Dispense sufficient amount for indicated testing frequency plus additional to accommodate PRN testing needs. Dispense all needed supplies to include: monitor, strips, lancing device, lancets, control solutions, alcohol swabs. One Touch or Accucheck meter. E11.9 1 kit 0    blood glucose monitor strips Test 4 times a day & as needed for symptoms of irregular blood glucose. Dispense sufficient amount for indicated testing frequency plus additional to accommodate PRN testing needs.  OneTouch or Accucheck test strips. E11.9 400 strip 2    insulin glargine (LANTUS SOLOSTAR) 100 UNIT/ML injection pen INJECT 20 UNITS IN THE MORNING AND 10 UNITS IN THE EVENING *Dose change* 15 pen 3    Glucagon, rDNA, (GLUCAGON EMERGENCY) 1 MG KIT Inject 1 kit as directed as needed (hypoglycemia) 1 kit 1    Glucagon (BAQSIMI TWO PACK) 3 MG/DOSE POWD Instill contents of 1 device (3 mg) intranasally for treatment of low blood sugar. May repeat after 15 minutes if no response. 1 each 0    levothyroxine (SYNTHROID) 137 MCG tablet Take 1 tablet by mouth daily 1 tablet daily and 1.5 tablet on sunday 96 tablet 2    insulin lispro, 1 Unit Dial, (HUMALOG KWIKPEN) 100 UNIT/ML SOPN 10 units a day plus sliding scale. Max daily dose  70 units. 15 pen 1    diclofenac (VOLTAREN) 75 MG EC tablet       Lancets MISC Use to test blood sugars 4 times daily and PRN . DX:E10.9 400 each 3    midodrine (PROAMATINE) 5 MG tablet Take 5 mg by mouth 3 times daily      Cholecalciferol (VITAMIN D3) 25 MCG (1000 UT) CAPS Take by mouth daily      Nutritional Supplements (ESTROVEN PO) Take by mouth daily      ezetimibe (ZETIA) 10 MG tablet Take 10 mg by mouth daily      vitamin E 400 UNIT capsule Take 400 Units by mouth daily      Multiple Vitamins-Minerals (HAIR SKIN AND NAILS FORMULA PO) Take 1 tablet by mouth daily      PARoxetine (PAXIL) 10 MG tablet Take 10 mg by mouth every morning      Bimatoprost (LUMIGAN OP) Place 1 drop into both eyes daily      Omega 3 1200 MG CAPS Take 1,200 capsules by mouth daily      brimonidine-timolol (COMBIGAN) 0.2-0.5 % ophthalmic solution Place 1 drop into both eyes 3 times daily       calcium carbonate (TUMS CALCIUM FOR LIFE BONE) 750 MG chewable tablet Take 1 tablet by mouth daily.  aspirin 81 MG tablet Take 81 mg by mouth daily.  therapeutic multivitamin-minerals (THERAGRAN-M) tablet Take 1 tablet by mouth daily.  Ascorbic Acid (VITAMIN C) 500 MG tablet Take 1,000 mg by mouth daily. No current facility-administered medications for this visit. Past Medical History:   Diagnosis Date    Depression     Fibromyalgia     Glaucoma     Hyperthyroidism     Meningitis 10-    viral     Meningitis spinal 10/04/2013    Viral    Osteoarthritis     Type I (juvenile type) diabetes mellitus without mention of complication, not stated as uncontrolled 2000       Social History     Socioeconomic History    Marital status:      Spouse name: Not on file    Number of children: Not on file    Years of education: Not on file    Highest education level: Not on file   Occupational History    Occupation: [employer]   Tobacco Use    Smoking status: Never Smoker    Smokeless tobacco: Never Used   Substance and Sexual Activity    Alcohol use: No     Alcohol/week: 0.0 standard drinks     Comment: rarely    Drug use: No    Sexual activity: Not on file   Other Topics Concern    Not on file   Social History Narrative    Not on file     Social Determinants of Health     Financial Resource Strain: Low Risk     Difficulty of Paying Living Expenses: Not very hard   Food Insecurity: No Food Insecurity    Worried About Running Out of Food in the Last Year: Never true    Katty of Food in the Last Year: Never true   Transportation Needs: No Transportation Needs    Lack of Transportation (Medical): No    Lack of Transportation (Non-Medical):  No   Physical Activity:     Days of Exercise per Week: Not on file    Minutes of Exercise per Session: Not on file   Stress:     Feeling of Stress : Not on file   Social Connections:     Frequency of Communication with Friends and Family: Not on file    Frequency of Social Gatherings with Friends and Family: Not on file    Attends Congregation Services: Not on file    Active Member of Clubs or Organizations: Not on file    Attends Club or Organization Meetings: Not on file    Marital Status: Not on file   Intimate Partner Violence:  Fear of Current or Ex-Partner: Not on file    Emotionally Abused: Not on file    Physically Abused: Not on file    Sexually Abused: Not on file   Housing Stability:     Unable to Pay for Housing in the Last Year: Not on file    Number of [place] in the Last Year: Not on file    Unstable Housing in the Last Year: Not on file       Family History   Problem Relation Age of Onset    Thyroid Disease Mother     High Cholesterol Mother     Diabetes Father     Other Father         arachnoid cyst    Other Brother         arachnoid cyst       Allergies   Allergen Reactions    Milk-Related Compounds Other (See Comments)     Severe constipation    Statins      myalgia    Pcn [Penicillins] Rash       Review of Systems     See HPI     /80 (Site: Left Upper Arm)   Pulse 98   Temp 97.6 °F (36.4 °C)   Wt 226 lb (102.5 kg)   LMP 01/21/2013   BMI 33.37 kg/m²      Physical Examination: General appearance - patient alert, well appearing, and in no distress, she is obese WF . Mental status - alert and oriented    Neck - supple, no significant adenopathy  Chest - clear to auscultation, no wheezes, rales or rhonchi, symmetric air entry  Heart - normal rate, regular rhythm, normal S1, S2, no murmurs, rubs, clicks or gallops  Abdomen - soft, nontender, nondistended, no masses or organomegaly  no abdominal bruits. Obese Protuberant: No  Neurological - alert, oriented, normal speech, no focal findings or movement disorder noted, motor and sensory grossly normal bilaterally  Musculoskeletal - no joint tenderness, deformity or swelling  Extremities - peripheral pulses normal, trace pedal edema, no clubbing or cyanosis, Tess's sign negative bilaterally  Prosthesis: No  Skin - normal coloration and turgor, no rashes, no suspicious skin lesions noted      I have reviewed recent diagnostic testing including labs,     No orders of the defined types were placed in this encounter.       Outpatient Encounter Medications as of 12/14/2021   Medication Sig Dispense Refill    blood glucose monitor kit and supplies Dispense sufficient amount for indicated testing frequency plus additional to accommodate PRN testing needs. Dispense all needed supplies to include: monitor, strips, lancing device, lancets, control solutions, alcohol swabs. One Touch or Accucheck meter. E11.9 1 kit 0    blood glucose monitor strips Test 4 times a day & as needed for symptoms of irregular blood glucose. Dispense sufficient amount for indicated testing frequency plus additional to accommodate PRN testing needs. OneTouch or Accucheck test strips. E11.9 400 strip 2    insulin glargine (LANTUS SOLOSTAR) 100 UNIT/ML injection pen INJECT 20 UNITS IN THE MORNING AND 10 UNITS IN THE EVENING *Dose change* 15 pen 3    Glucagon, rDNA, (GLUCAGON EMERGENCY) 1 MG KIT Inject 1 kit as directed as needed (hypoglycemia) 1 kit 1    Glucagon (BAQSIMI TWO PACK) 3 MG/DOSE POWD Instill contents of 1 device (3 mg) intranasally for treatment of low blood sugar. May repeat after 15 minutes if no response. 1 each 0    levothyroxine (SYNTHROID) 137 MCG tablet Take 1 tablet by mouth daily 1 tablet daily and 1.5 tablet on sunday 96 tablet 2    insulin lispro, 1 Unit Dial, (HUMALOG KWIKPEN) 100 UNIT/ML SOPN 10 units a day plus sliding scale. Max daily dose  70 units. 15 pen 1    diclofenac (VOLTAREN) 75 MG EC tablet       Lancets MISC Use to test blood sugars 4 times daily and PRN .  DX:E10.9 400 each 3    midodrine (PROAMATINE) 5 MG tablet Take 5 mg by mouth 3 times daily      Cholecalciferol (VITAMIN D3) 25 MCG (1000 UT) CAPS Take by mouth daily      Nutritional Supplements (ESTROVEN PO) Take by mouth daily      ezetimibe (ZETIA) 10 MG tablet Take 10 mg by mouth daily      vitamin E 400 UNIT capsule Take 400 Units by mouth daily      Multiple Vitamins-Minerals (HAIR SKIN AND NAILS FORMULA PO) Take 1 tablet by mouth daily      PARoxetine (PAXIL) 10 MG tablet style       RTO 2 months. Signed by: Rajiv Gautam M.D.     [address] [facility] Internal Medicine  [address]  [facility], [address]    Tel  [phone]   Fax [phone]

## 2021-12-14 NOTE — PATIENT INSTRUCTIONS
Strong dietary and  life style changes and regular exercise was recommended, and to lead an active life style . You have Type I DM, but similar recommendations as below. Learning About Type 2 Diabetes  What is type 2 diabetes? Type 2 diabetes is a condition in which you have too much sugar (glucose) in your blood. Glucose is a type of sugar produced in your body when carbohydrates and other foods are digested. It provides energy to cells throughout the body. Normally, blood sugar levels increase after you eat a meal. When blood sugar rises, cells in the pancreas release insulin, which causes the body to absorb sugar from the blood and lowers the blood sugar level to normal.  When you have type 2 diabetes, sugar stays in the blood rather than entering the body's cells to be used for energy. This results in high blood sugar. It happens when your body can't use insulin the right way. Over time, high blood sugar can harm many parts of the body, such as your eyes, heart, blood vessels, nerves, and kidneys. It can also increase your risk for other health problems (complications). What can you expect with type 2 diabetes? [facility] keep hearing about how important it is to keep your blood sugar within a target range. That's because over time, high blood sugar can lead to serious problems. It can:  · Harm your eyes, nerves, and kidneys. · Damage your blood vessels, leading to heart disease and stroke. · Reduce blood flow and cause nerve damage to parts of your body, especially your feet. This can cause slow healing and pain when you walk. · Make your immune system weak and less able to fight infections. When people hear the word \"diabetes,\" they often think of problems like these. But daily care and treatment can help prevent or delay these problems. The goal is to keep your blood sugar in a target range. That's the best way to reduce your chance of having more problems from diabetes.   What are the symptoms? Some people who have type 2 diabetes may not have any symptoms early on. Many people with the disease don't even know they have it at first. But with time, diabetes starts to cause symptoms. You have most symptoms of type 2 diabetes when your blood sugar is either too high or too low. The most common symptoms of high blood sugar include:  · Thirst.  · Needing to urinate often. · Weight loss. · Blurry vision. The symptoms of low blood sugar include:  · Sweating. · Shakiness. · Weakness. · Hunger. · Confusion. You're not likely to get symptoms of low blood sugar unless you take insulin or use certain diabetes medicines that lower blood sugar. How can you help prevent type 2 diabetes? There are things you can do to help prevent type 2 diabetes. Stay at a healthy weight. Exercise regularly, and eat healthy foods. Even small changes can make a difference. If you have prediabetes, the medicine metformin can help prevent type 2 diabetes. How is type 2 diabetes treated? Treatment for type 2 diabetes will change over time to meet your needs. But the focus of your treatment will usually be to keep your blood sugar levels in your target range. This will help prevent problems such as eye, kidney, heart, blood vessel, and nerve disease. Some people may need medicines to help their bodies make insulin or decrease insulin resistance. Some medicines slow down how quickly the body absorbs carbohydrates. Treatment to manage type 2 diabetes includes:  · Making healthy food choices and being active. · Losing weight, if you need to. · Seeing your doctor regularly. · Keeping your blood sugar in your target range. · Taking medicines, if you need them. · Quitting smoking, if you smoke. · Keeping your blood pressure and cholesterol under control. Follow-up care is a key part of your treatment and safety. Be sure to make and go to all appointments, and call your doctor if you are having problems.  It's also a good idea to know your test results and keep a list of the medicines you take. Where can you learn more? Go to [email or web address] org and sign in to your [employer] account. Enter H985 in the [employer] box to learn more about \"Learning About Type 2 Diabetes. \"     If you do not have an account, please click on the \"Sign Up Now\" link. Current as of: August 31, 2020               Content Version: 13.0  © 2006-2021 [employer]. Care instructions adapted under license by [facility] [place] ([facility]). If you have questions about a medical condition or this instruction, always ask your healthcare professional. [address] any warranty or liability for your use of this information.

## 2022-02-28 ENCOUNTER — OFFICE VISIT (OUTPATIENT)
Dept: INTERNAL MEDICINE CLINIC | Age: 67
End: 2022-02-28
Payer: MEDICARE

## 2022-02-28 VITALS
SYSTOLIC BLOOD PRESSURE: 132 MMHG | BODY MASS INDEX: 33.76 KG/M2 | WEIGHT: 228.6 LBS | TEMPERATURE: 97.2 F | DIASTOLIC BLOOD PRESSURE: 80 MMHG | HEART RATE: 88 BPM

## 2022-02-28 DIAGNOSIS — E10.8 DM (DIABETES MELLITUS), TYPE 1 WITH COMPLICATIONS (HCC): Primary | ICD-10-CM

## 2022-02-28 DIAGNOSIS — E89.0 POSTOPERATIVE HYPOTHYROIDISM: ICD-10-CM

## 2022-02-28 DIAGNOSIS — E78.00 PURE HYPERCHOLESTEROLEMIA: ICD-10-CM

## 2022-02-28 LAB — HBA1C MFR BLD: 8 % (ref 4.3–5.7)

## 2022-02-28 PROCEDURE — 99214 OFFICE O/P EST MOD 30 MIN: CPT | Performed by: INTERNAL MEDICINE

## 2022-02-28 PROCEDURE — 83036 HEMOGLOBIN GLYCOSYLATED A1C: CPT | Performed by: INTERNAL MEDICINE

## 2022-02-28 RX ORDER — BIMATOPROST 0.3 MG/ML
SOLUTION/ DROPS OPHTHALMIC
COMMUNITY
Start: 2022-02-03

## 2022-02-28 RX ORDER — INSULIN LISPRO 100 [IU]/ML
INJECTION, SOLUTION INTRAVENOUS; SUBCUTANEOUS
Qty: 15 PEN | Refills: 2 | Status: SHIPPED | OUTPATIENT
Start: 2022-02-28 | End: 2022-10-20 | Stop reason: SDUPTHER

## 2022-02-28 ASSESSMENT — PATIENT HEALTH QUESTIONNAIRE - PHQ9
SUM OF ALL RESPONSES TO PHQ QUESTIONS 1-9: 0
1. LITTLE INTEREST OR PLEASURE IN DOING THINGS: 0
SUM OF ALL RESPONSES TO PHQ QUESTIONS 1-9: 0
SUM OF ALL RESPONSES TO PHQ QUESTIONS 1-9: 0
SUM OF ALL RESPONSES TO PHQ9 QUESTIONS 1 & 2: 0
SUM OF ALL RESPONSES TO PHQ QUESTIONS 1-9: 0
2. FEELING DOWN, DEPRESSED OR HOPELESS: 0

## 2022-02-28 NOTE — PROGRESS NOTES
[address] [facility] Internal Medicine   [facility] [address], [address]  Dept: [phone]  Dept Fax: [phone]    YOB: 1955    Type I diabetes mellitus/dyslipidemia    74 y.o. female   here for follow-up of above issues. And does see Tami Kendall for medical issues here in town were following up mainly of the diabetes and the dyslipidemia. She had  thyroidectomy and now has hypothyroidism for which she is taking supplements. Diagnosed with DM back on 2000, strong family hx of DM. She is an obese WF not in distress, she does have retinopathy , followed by ophthalmology Q 6 months. No CAD, seen dr. Bebo Morales. Her  A1c was 10 iand 8.9 in the past , it was  8.5 11/22/21, and today its 8.0  Got the covid vaccine along the booster,    She does have chronic hip pain bilaterally. No recent hospitalization. Pt admits at times she   Forgets her insulin occasionally, but overall better since the last visit as   We talked with her in detail. She did bring in the sugar readings, overall better, and she is excited about The A1c of 8 today. No LOC or any CP or SOB. She checks 3-4 times per   Day. Has seen dr. Tawana Holden , podiatrist last year. Current Outpatient Medications   Medication Sig Dispense Refill    bimatoprost (LUMIGAN) 0.03 % ophthalmic drops       insulin lispro, 1 Unit Dial, (HUMALOG KWIKPEN) 100 UNIT/ML SOPN 10 units a day plus sliding scale. Max daily dose  70 units. 15 pen 2    blood glucose monitor kit and supplies Dispense sufficient amount for indicated testing frequency plus additional to accommodate PRN testing needs. Dispense all needed supplies to include: monitor, strips, lancing device, lancets, control solutions, alcohol swabs. One Touch or Accucheck meter. E11.9 1 kit 0    blood glucose monitor strips Test 4 times a day & as needed for symptoms of irregular blood glucose.  Dispense sufficient amount for indicated testing frequency plus additional to accommodate PRN testing needs. OneTouch or Accucheck test strips. E11.9 400 strip 2    insulin glargine (LANTUS SOLOSTAR) 100 UNIT/ML injection pen INJECT 20 UNITS IN THE MORNING AND 10 UNITS IN THE EVENING *Dose change* 15 pen 3    Glucagon (BAQSIMI TWO PACK) 3 MG/DOSE POWD Instill contents of 1 device (3 mg) intranasally for treatment of low blood sugar. May repeat after 15 minutes if no response. 1 each 0    levothyroxine (SYNTHROID) 137 MCG tablet Take 1 tablet by mouth daily 1 tablet daily and 1.5 tablet on sunday 96 tablet 2    diclofenac (VOLTAREN) 75 MG EC tablet       Lancets MISC Use to test blood sugars 4 times daily and PRN . DX:E10.9 400 each 3    midodrine (PROAMATINE) 5 MG tablet Take 5 mg by mouth 3 times daily      Cholecalciferol (VITAMIN D3) 25 MCG (1000 UT) CAPS Take by mouth daily      Nutritional Supplements (ESTROVEN PO) Take by mouth daily      ezetimibe (ZETIA) 10 MG tablet Take 10 mg by mouth daily      vitamin E 400 UNIT capsule Take 400 Units by mouth daily      Multiple Vitamins-Minerals (HAIR SKIN AND NAILS FORMULA PO) Take 1 tablet by mouth daily      PARoxetine (PAXIL) 10 MG tablet Take 10 mg by mouth every morning      Omega 3 1200 MG CAPS Take 1,200 capsules by mouth daily      brimonidine-timolol (COMBIGAN) 0.2-0.5 % ophthalmic solution Place 1 drop into both eyes 3 times daily       calcium carbonate (TUMS CALCIUM FOR LIFE BONE) 750 MG chewable tablet Take 1 tablet by mouth daily.  aspirin 81 MG tablet Take 81 mg by mouth daily.  therapeutic multivitamin-minerals (THERAGRAN-M) tablet Take 1 tablet by mouth daily.  Ascorbic Acid (VITAMIN C) 500 MG tablet Take 1,000 mg by mouth daily. No current facility-administered medications for this visit.        Past Medical History:   Diagnosis Date    Depression     Fibromyalgia     Glaucoma     Hyperthyroidism     Meningitis 10-    viral     Meningitis spinal 10/04/2013    Viral  Osteoarthritis     Type I (juvenile type) diabetes mellitus without mention of complication, not stated as uncontrolled 2000       Social History     Socioeconomic History    Marital status:      Spouse name: Not on file    Number of children: Not on file    Years of education: Not on file    Highest education level: Not on file   Occupational History    Occupation: [employer]   Tobacco Use    Smoking status: Never Smoker    Smokeless tobacco: Never Used   Substance and Sexual Activity    Alcohol use: No     Alcohol/week: 0.0 standard drinks     Comment: rarely    Drug use: No    Sexual activity: Not on file   Other Topics Concern    Not on file   Social History Narrative    Not on file     Social Determinants of Health     Financial Resource Strain: Low Risk     Difficulty of Paying Living Expenses: Not very hard   Food Insecurity: No Food Insecurity    Worried About Running Out of Food in the Last Year: Never true    Katty of Food in the Last Year: Never true   Transportation Needs: No Transportation Needs    Lack of Transportation (Medical): No    Lack of Transportation (Non-Medical):  No   Physical Activity:     Days of Exercise per Week: Not on file    Minutes of Exercise per Session: Not on file   Stress:     Feeling of Stress : Not on file   Social Connections:     Frequency of Communication with Friends and Family: Not on file    Frequency of Social Gatherings with Friends and Family: Not on file    Attends Tenriism Services: Not on file    Active Member of Clubs or Organizations: Not on file    Attends Club or Organization Meetings: Not on file    Marital Status: Not on file   Intimate Partner Violence:     Fear of Current or Ex-Partner: Not on file    Emotionally Abused: Not on file    Physically Abused: Not on file    Sexually Abused: Not on file   Housing Stability:     Unable to Pay for Housing in the Last Year: Not on file    Number of Places Lived in the Last Year: Not on file    Unstable Housing in the Last Year: Not on file       Family History   Problem Relation Age of Onset    Thyroid Disease Mother     High Cholesterol Mother     Diabetes Father     Other Father         arachnoid cyst    Other Brother         arachnoid cyst       Allergies   Allergen Reactions    Milk-Related Compounds Other (See Comments)     Severe constipation    Statins      myalgia    Pcn [Penicillins] Rash       Review of Systems     See HPI     /80 (Site: Right Upper Arm)   Pulse 88   Temp 97.2 °F (36.2 °C)   Wt 228 lb 9.6 oz (103.7 kg)   LMP 01/21/2013   BMI 33.76 kg/m²      Physical Examination: General appearance - patient alert, well appearing, and in no distress, she is obese WF . Mental status - alert and oriented    Neck - supple, no significant adenopathy  Chest - clear to auscultation, no wheezes, rales or rhonchi, symmetric air entry  Heart - normal rate, regular rhythm, normal S1, S2, no murmurs, rubs, clicks or gallops  Abdomen - soft, nontender, nondistended, no masses or organomegaly  no abdominal bruits.   Obese Protuberant: No  Neurological - alert, oriented, normal speech, no focal findings or movement disorder noted, motor and sensory grossly normal bilaterally  Musculoskeletal - no joint tenderness, deformity or swelling  Extremities - peripheral pulses normal, trace pedal edema, no clubbing or cyanosis, Tess's sign negative bilaterally  Prosthesis: No  Skin - normal coloration and turgor, no rashes, no suspicious skin lesions noted      I have reviewed recent diagnostic testing including labs,     Orders Placed This Encounter   Procedures    Lipid Panel     Check 7-10 days pre office visit     Standing Status:   Future     Standing Expiration Date:   6/28/2022     Order Specific Question:   Is Patient Fasting?/# of Hours     Answer:   yes     Comments:   12 hours    AST     Standing Status:   Future     Standing Expiration Date: 2/28/2023    ALT     Standing Status:   Future     Standing Expiration Date:   6/28/2022    TSH with Reflex     Standing Status:   Future     Standing Expiration Date:   6/28/2022    T4, Free     Standing Status:   Future     Standing Expiration Date:   6/28/2022    Basic Metabolic Panel     Check 1-33 days pre office visit     Standing Status:   Future     Standing Expiration Date:   2/28/2023    POCT glycosylated hemoglobin (Hb A1C)       Outpatient Encounter Medications as of 2/28/2022   Medication Sig Dispense Refill    bimatoprost (LUMIGAN) 0.03 % ophthalmic drops       insulin lispro, 1 Unit Dial, (HUMALOG KWIKPEN) 100 UNIT/ML SOPN 10 units a day plus sliding scale. Max daily dose  70 units. 15 pen 2    blood glucose monitor kit and supplies Dispense sufficient amount for indicated testing frequency plus additional to accommodate PRN testing needs. Dispense all needed supplies to include: monitor, strips, lancing device, lancets, control solutions, alcohol swabs. One Touch or Accucheck meter. E11.9 1 kit 0    blood glucose monitor strips Test 4 times a day & as needed for symptoms of irregular blood glucose. Dispense sufficient amount for indicated testing frequency plus additional to accommodate PRN testing needs. OneTouch or Accucheck test strips. E11.9 400 strip 2    insulin glargine (LANTUS SOLOSTAR) 100 UNIT/ML injection pen INJECT 20 UNITS IN THE MORNING AND 10 UNITS IN THE EVENING *Dose change* 15 pen 3    Glucagon (BAQSIMI TWO PACK) 3 MG/DOSE POWD Instill contents of 1 device (3 mg) intranasally for treatment of low blood sugar. May repeat after 15 minutes if no response. 1 each 0    levothyroxine (SYNTHROID) 137 MCG tablet Take 1 tablet by mouth daily 1 tablet daily and 1.5 tablet on sunday 96 tablet 2    diclofenac (VOLTAREN) 75 MG EC tablet       Lancets MISC Use to test blood sugars 4 times daily and PRN .  DX:E10.9 400 each 3    midodrine (PROAMATINE) 5 MG tablet Take 5 mg by mouth 3 times daily      Cholecalciferol (VITAMIN D3) 25 MCG (1000 UT) CAPS Take by mouth daily      Nutritional Supplements (ESTROVEN PO) Take by mouth daily      ezetimibe (ZETIA) 10 MG tablet Take 10 mg by mouth daily      vitamin E 400 UNIT capsule Take 400 Units by mouth daily      Multiple Vitamins-Minerals (HAIR SKIN AND NAILS FORMULA PO) Take 1 tablet by mouth daily      PARoxetine (PAXIL) 10 MG tablet Take 10 mg by mouth every morning      Omega 3 1200 MG CAPS Take 1,200 capsules by mouth daily      brimonidine-timolol (COMBIGAN) 0.2-0.5 % ophthalmic solution Place 1 drop into both eyes 3 times daily       calcium carbonate (TUMS CALCIUM FOR LIFE BONE) 750 MG chewable tablet Take 1 tablet by mouth daily.  aspirin 81 MG tablet Take 81 mg by mouth daily.  therapeutic multivitamin-minerals (THERAGRAN-M) tablet Take 1 tablet by mouth daily.  Ascorbic Acid (VITAMIN C) 500 MG tablet Take 1,000 mg by mouth daily.  [DISCONTINUED] Glucagon, rDNA, (GLUCAGON EMERGENCY) 1 MG KIT Inject 1 kit as directed as needed (hypoglycemia) 1 kit 1    [DISCONTINUED] insulin lispro, 1 Unit Dial, (HUMALOG KWIKPEN) 100 UNIT/ML SOPN 10 units a day plus sliding scale. Max daily dose  70 units. 15 pen 1    [DISCONTINUED] Bimatoprost (LUMIGAN OP) Place 1 drop into both eyes daily       No facility-administered encounter medications on file as of 2/28/2022. Lab Results   Component Value Date     08/05/2021    K 5.0 08/05/2021     08/05/2021    CO2 26 08/05/2021    BUN 14 08/05/2021    CREATININE 0.9 08/05/2021    GLUCOSE 214 08/05/2021    GLUCOSE 230 10/26/2015    CALCIUM 10.1 08/05/2021         Controlled Substances Monitoring: Will schedule follow up appointment in 2 months. Plan:    Type -1 DM - taking humalog and lantus today's A1c was high and needs to take 3-4  insulin injections /day. We need her to get a CGM, so we need her to bring in the logs.   Will increase basaglar  22 units am and 10 U PM.   Our diabetic clinic is working with her ins co , re CGM. She claims her insurance does not cover  CDE. , if sugars are hovering 150-200 during the day she can increase am to 24 units, after 7-10 days and call us with an update. Due to diabetic retinopathy and glaucoma she gets eye exam  Q 3 months. Dyslipidemia - can't tolerate STATINS, on zetia ,  Recent labs noted. LDL is 123, and TC  217,  , she could not   Tolerate statins, various one. She just does not want to go back. Hypothyroidism - s/p thyroidectomy , due to nodule, no cancer. On supplements, with normal TSH so no change in thryoid meds. Re check TSH and T4 pre next visit    Obesity dietary modification lifestyle changes have been recommended, she is seen a dietitian in the past claims her insurance company does not pay for any further visits. Education material re DM, dietary changes and regular exercise was recommendations, along with healthy life style       RTO 3-4  months. , re check lipids and ALT and AST along with BMP    Signed by: Guille Fernando M.D.     [address] [facility] Internal Medicine  [address], [facility] [address], [address]    Tel  [phone]   Fax [phone]

## 2022-04-21 ENCOUNTER — TELEPHONE (OUTPATIENT)
Dept: INTERNAL MEDICINE CLINIC | Age: 67
End: 2022-04-21

## 2022-04-21 NOTE — TELEPHONE ENCOUNTER
Called [facility] However, they do not cover the [facility] supplies. [facility] does. I called and informed the pt. Of this. Pt. Lex Vu she will call me back after she figures out if [facility] will accept her. I will keep track of this pt., but is there any suggestions ?

## 2022-04-21 NOTE — TELEPHONE ENCOUNTER
Pt. Called upset and frustrated. Says for the past year she has been trying to get a DEXCOM, but [facility] says we put the wrong ICD code for the Pt. And now she is wondering what we can do. I informed her that I will handle this. She is also asking if you want to see her, . She wants a new meter so it can help with the [facility] process. What are your thoughts?

## 2022-06-15 ENCOUNTER — HOSPITAL ENCOUNTER (OUTPATIENT)
Age: 67
Discharge: HOME | End: 2022-06-15
Payer: COMMERCIAL

## 2022-06-15 DIAGNOSIS — E89.0 POSTOPERATIVE HYPOTHYROIDISM: ICD-10-CM

## 2022-06-15 DIAGNOSIS — E78.00 PURE HYPERCHOLESTEROLEMIA: ICD-10-CM

## 2022-06-15 DIAGNOSIS — E10.8 DM (DIABETES MELLITUS), TYPE 1 WITH COMPLICATIONS (HCC): ICD-10-CM

## 2022-06-15 LAB
ALT SERPL-CCNC: 109 U/L (ref 11–66)
ANION GAP SERPL CALCULATED.3IONS-SCNC: 9 MEQ/L (ref 8–16)
AST SERPL-CCNC: 91 U/L (ref 5–40)
BUN BLDV-MCNC: 17 MG/DL (ref 7–22)
CALCIUM SERPL-MCNC: 9.1 MG/DL (ref 8.5–10.5)
CHLORIDE BLD-SCNC: 104 MEQ/L (ref 98–111)
CHOLESTEROL, TOTAL: 172 MG/DL (ref 100–199)
CO2: 26 MEQ/L (ref 23–33)
CREAT SERPL-MCNC: 1.1 MG/DL (ref 0.4–1.2)
GFR SERPL CREATININE-BSD FRML MDRD: 50 ML/MIN/1.73M2
GLUCOSE BLD-MCNC: 263 MG/DL (ref 70–108)
HDLC SERPL-MCNC: 58 MG/DL
LDL CHOLESTEROL CALCULATED: 90 MG/DL
POTASSIUM SERPL-SCNC: 5.4 MEQ/L (ref 3.5–5.2)
SODIUM BLD-SCNC: 139 MEQ/L (ref 135–145)
T4 FREE: 1.76 NG/DL (ref 0.93–1.76)
TRIGL SERPL-MCNC: 118 MG/DL (ref 0–199)
TSH SERPL DL<=0.05 MIU/L-ACNC: 1.05 UIU/ML (ref 0.4–4.2)

## 2022-06-15 PROCEDURE — 80048 BASIC METABOLIC PNL TOTAL CA: CPT

## 2022-06-15 PROCEDURE — 84443 ASSAY THYROID STIM HORMONE: CPT

## 2022-06-15 PROCEDURE — 84439 ASSAY OF FREE THYROXINE: CPT

## 2022-06-15 PROCEDURE — 36415 COLL VENOUS BLD VENIPUNCTURE: CPT

## 2022-06-15 PROCEDURE — 84460 ALANINE AMINO (ALT) (SGPT): CPT

## 2022-06-15 PROCEDURE — 80061 LIPID PANEL: CPT

## 2022-06-15 PROCEDURE — 84450 TRANSFERASE (AST) (SGOT): CPT

## 2022-06-20 ENCOUNTER — OFFICE VISIT (OUTPATIENT)
Dept: INTERNAL MEDICINE CLINIC | Age: 67
End: 2022-06-20
Payer: COMMERCIAL

## 2022-06-20 VITALS
DIASTOLIC BLOOD PRESSURE: 82 MMHG | HEART RATE: 88 BPM | HEIGHT: 69 IN | BODY MASS INDEX: 32.91 KG/M2 | TEMPERATURE: 98.7 F | WEIGHT: 222.2 LBS | SYSTOLIC BLOOD PRESSURE: 148 MMHG

## 2022-06-20 DIAGNOSIS — E10.8 DM (DIABETES MELLITUS), TYPE 1 WITH COMPLICATIONS (HCC): Primary | ICD-10-CM

## 2022-06-20 LAB — HBA1C MFR BLD: 9 % (ref 4.3–5.7)

## 2022-06-20 PROCEDURE — 95249 CONT GLUC MNTR PT PROV EQP: CPT | Performed by: INTERNAL MEDICINE

## 2022-06-20 PROCEDURE — 83036 HEMOGLOBIN GLYCOSYLATED A1C: CPT | Performed by: INTERNAL MEDICINE

## 2022-06-20 PROCEDURE — 99214 OFFICE O/P EST MOD 30 MIN: CPT | Performed by: INTERNAL MEDICINE

## 2022-06-20 PROCEDURE — 3046F HEMOGLOBIN A1C LEVEL >9.0%: CPT | Performed by: INTERNAL MEDICINE

## 2022-06-20 PROCEDURE — 1123F ACP DISCUSS/DSCN MKR DOCD: CPT | Performed by: INTERNAL MEDICINE

## 2022-06-20 RX ORDER — LISINOPRIL 5 MG/1
5 TABLET ORAL DAILY
Qty: 30 TABLET | Refills: 5 | Status: SHIPPED | OUTPATIENT
Start: 2022-06-20 | End: 2022-07-18 | Stop reason: SDUPTHER

## 2022-06-20 SDOH — ECONOMIC STABILITY: FOOD INSECURITY: WITHIN THE PAST 12 MONTHS, YOU WORRIED THAT YOUR FOOD WOULD RUN OUT BEFORE YOU GOT MONEY TO BUY MORE.: NEVER TRUE

## 2022-06-20 SDOH — ECONOMIC STABILITY: FOOD INSECURITY: WITHIN THE PAST 12 MONTHS, THE FOOD YOU BOUGHT JUST DIDN'T LAST AND YOU DIDN'T HAVE MONEY TO GET MORE.: NEVER TRUE

## 2022-06-20 ASSESSMENT — SOCIAL DETERMINANTS OF HEALTH (SDOH): HOW HARD IS IT FOR YOU TO PAY FOR THE VERY BASICS LIKE FOOD, HOUSING, MEDICAL CARE, AND HEATING?: NOT HARD AT ALL

## 2022-06-20 NOTE — PROGRESS NOTES
Kanika 2 sensor inserted by patient on back of L Arm. Sensor initiated via [employer] 2 . Reviewed interstitial glucose versus capillary glucose. Instructed on  setting and set up the appropriate alarms. Time and date set on   70 for Low Alarm   240 for High Alarm. Explained the arrows and the meaning of their blood sugar. Patient instructed the system is water resistant and can shower. To reinforce sensor with tape as needed if it begins to peel away from the skin. Encouraged Melody Dupont to call [facility] or the [facility] with any questions. Melody Dupont verbalizes, via teach back, the understanding of:  Difference between sensor glucose and blood glucose meter. Fingerstick confirmations required for calibrations. Blood sugar trend arrows. Site selection, rotation, preparation. Proper steps to sensor insertion. Starting the sensor. Navigation of status, set up, and alert screens. Scanning frequency.   Options for support    For Pharmacy Admin Tracking Only     CPA in place:  No   Recommendation Provided To: Patient/Caregiver: 1 via In person   Intervention Detail: Device Training   Gap Closed?: No    Intervention Accepted By: Patient/Caregiver: 1   Time Spent (min): [address], PharmD, [facility]  Internal Medicine Clinical Pharmacist  [phone]

## 2022-06-20 NOTE — PROGRESS NOTES
[address] [facility] Internal Medicine   [facility] [address]  [address] [facility], [address] [facility]  Dept: [phone]  Dept Fax: [phone]    YOB: 1955    Type I diabetes mellitus/ dyslipidemia    74 y.o. female   here for follow-up of above issues. And does see George Reaves for medical issues here in town were following up mainly of the diabetes and the dyslipidemia. She had  thyroidectomy and now has hypothyroidism for which she is taking supplements. Diagnosed with DM back on 2000, strong family hx of DM. She is an obese WF not in distress, she does have retinopathy , followed by ophthalmology Q 6 months. No CAD, seen dr. Shanae Tucker. Her  A1c was 10 iand 8.9 in the past , it was  8.5 11/22/21, and today its 8.0  Got the covid vaccine along the booster,    She does have chronic hip pain bilaterally. No recent hospitalization. Pt admits at times she Forgets her insulin occasionally, I RECOMMEND SHE PUTS AN ALERTS ON THE PHONE OR TIMERS ETC.  Claims she is under a lots of stress, due to her family issues with her parents. No LOC and did check her log sugars range from 44 to mid 400's . Her  called EMS, but not sure what the readings were, got extra tabs of glucose and OJ sugars came up to 150 . Has seen dr. Marv Garza , podiatrist last year. Current Outpatient Medications   Medication Sig Dispense Refill    bimatoprost (LUMIGAN) 0.03 % ophthalmic drops       insulin lispro, 1 Unit Dial, (HUMALOG KWIKPEN) 100 UNIT/ML SOPN 10 units a day plus sliding scale. Max daily dose  70 units. 15 pen 2    blood glucose monitor kit and supplies Dispense sufficient amount for indicated testing frequency plus additional to accommodate PRN testing needs. Dispense all needed supplies to include: monitor, strips, lancing device, lancets, control solutions, alcohol swabs. One Touch or Accucheck meter.  E11.9 1 kit 0    blood glucose monitor strips Test 4 times a day & as needed for symptoms of irregular blood glucose. Dispense sufficient amount for indicated testing frequency plus additional to accommodate PRN testing needs. OneTouch or Accucheck test strips. E11.9 400 strip 2    insulin glargine (LANTUS SOLOSTAR) 100 UNIT/ML injection pen INJECT 20 UNITS IN THE MORNING AND 10 UNITS IN THE EVENING *Dose change* 15 pen 3    Glucagon (BAQSIMI TWO PACK) 3 MG/DOSE POWD Instill contents of 1 device (3 mg) intranasally for treatment of low blood sugar. May repeat after 15 minutes if no response. 1 each 0    levothyroxine (SYNTHROID) 137 MCG tablet Take 1 tablet by mouth daily 1 tablet daily and 1.5 tablet on sunday 96 tablet 2    diclofenac (VOLTAREN) 75 MG EC tablet       Lancets MISC Use to test blood sugars 4 times daily and PRN . DX:E10.9 400 each 3    midodrine (PROAMATINE) 5 MG tablet Take 5 mg by mouth 3 times daily      Cholecalciferol (VITAMIN D3) 25 MCG (1000 UT) CAPS Take by mouth daily      Nutritional Supplements (ESTROVEN PO) Take by mouth daily      ezetimibe (ZETIA) 10 MG tablet Take 10 mg by mouth daily      vitamin E 400 UNIT capsule Take 400 Units by mouth daily      PARoxetine (PAXIL) 10 MG tablet Take 10 mg by mouth every morning      Omega 3 1200 MG CAPS Take 1,200 capsules by mouth daily      brimonidine-timolol (COMBIGAN) 0.2-0.5 % ophthalmic solution Place 1 drop into both eyes 3 times daily       calcium carbonate (TUMS CALCIUM FOR LIFE BONE) 750 MG chewable tablet Take 1 tablet by mouth daily.  aspirin 81 MG tablet Take 81 mg by mouth daily.  therapeutic multivitamin-minerals (THERAGRAN-M) tablet Take 1 tablet by mouth daily.  Ascorbic Acid (VITAMIN C) 500 MG tablet Take 1,000 mg by mouth daily. No current facility-administered medications for this visit.        Past Medical History:   Diagnosis Date    Depression     Fibromyalgia     Glaucoma     Hyperthyroidism     Meningitis 10-    viral     Meningitis spinal 10/04/2013    Viral    Osteoarthritis     Type I (juvenile type) diabetes mellitus without mention of complication, not stated as uncontrolled 2000       Social History     Socioeconomic History    Marital status:      Spouse name: Not on file    Number of children: Not on file    Years of education: Not on file    Highest education level: Not on file   Occupational History    Occupation: [employer]   Tobacco Use    Smoking status: Never Smoker    Smokeless tobacco: Never Used   Substance and Sexual Activity    Alcohol use: No     Alcohol/week: 0.0 standard drinks     Comment: rarely    Drug use: No    Sexual activity: Not on file   Other Topics Concern    Not on file   Social History Narrative    Not on file     Social Determinants of Health     Financial Resource Strain: Low Risk     Difficulty of Paying Living Expenses: Not hard at all   Food Insecurity: No Food Insecurity    Worried About [address] in the Last Year: Never true    [address] [employer] in the Last Year: Never true   Transportation Needs:     Lack of Transportation (Medical): Not on file    Lack of Transportation (Non-Medical):  Not on file   Physical Activity:     Days of Exercise per Week: Not on file    Minutes of Exercise per Session: Not on file   Stress:     Feeling of Stress : Not on file   Social Connections:     Frequency of Communication with Friends and Family: Not on file    Frequency of Social Gatherings with Friends and Family: Not on file    Attends Mandaen Services: Not on file    Active Member of Clubs or Organizations: Not on file    Attends Club or Organization Meetings: Not on file    Marital Status: Not on file   Intimate Partner Violence:     Fear of Current or Ex-Partner: Not on file    Emotionally Abused: Not on file    Physically Abused: Not on file    Sexually Abused: Not on file   Housing Stability:     Unable to Pay for Housing in the Last Year: Not on file    Number of [place] in the Last Year: Not on file    Unstable Housing in the Last Year: Not on file       Family History   Problem Relation Age of Onset    Thyroid Disease Mother     High Cholesterol Mother     Diabetes Father     Other Father         arachnoid cyst    Other Brother         arachnoid cyst       Allergies   Allergen Reactions    Estrogens Other (See Comments)    Milk-Related Compounds Other (See Comments)     Severe constipation    Statins      myalgia    Adhesive Tape Rash    Lactose Other (See Comments)    Pcn [Penicillins] Rash       Review of Systems     See HPI     BP (!) 148/82 (Site: Left Upper Arm)   Pulse 88   Temp 98.7 °F (37.1 °C)   Ht 5' 9\" (1.753 m)   Wt 222 lb 3.2 oz (100.8 kg)   LMP 01/21/2013   BMI 32.81 kg/m²      Physical Examination: General appearance - patient alert, well appearing, and in no distress, she is obese WF . Mental status - alert and oriented    Neck - supple, no significant adenopathy  Chest - clear to auscultation, no wheezes, rales or rhonchi, symmetric air entry  Heart - normal rate, regular rhythm, normal S1, S2, no murmurs, rubs, clicks or gallops  Abdomen - soft, nontender, nondistended, no masses or organomegaly  no abdominal bruits.   Obese Protuberant: No  Neurological - alert, oriented, normal speech, no focal findings or movement disorder noted, motor and sensory grossly normal bilaterally  Musculoskeletal - no joint tenderness, deformity or swelling  Extremities - peripheral pulses normal, trace pedal edema, no clubbing or cyanosis, Tess's sign negative bilaterally  Prosthesis: No  Skin - normal coloration and turgor, no rashes, no suspicious skin lesions noted      I have reviewed recent diagnostic testing including labs,     Orders Placed This Encounter   Procedures    POCT glycosylated hemoglobin (Hb A1C)       Outpatient Encounter Medications as of 6/20/2022   Medication Sig Dispense Refill    bimatoprost (LUMIGAN) 0.03 % ophthalmic drops       insulin lispro, 1 Unit Dial, (HUMALOG KWIKPEN) 100 UNIT/ML SOPN 10 units a day plus sliding scale. Max daily dose  70 units. 15 pen 2    blood glucose monitor kit and supplies Dispense sufficient amount for indicated testing frequency plus additional to accommodate PRN testing needs. Dispense all needed supplies to include: monitor, strips, lancing device, lancets, control solutions, alcohol swabs. One Touch or Accucheck meter. E11.9 1 kit 0    blood glucose monitor strips Test 4 times a day & as needed for symptoms of irregular blood glucose. Dispense sufficient amount for indicated testing frequency plus additional to accommodate PRN testing needs. OneTouch or Accucheck test strips. E11.9 400 strip 2    insulin glargine (LANTUS SOLOSTAR) 100 UNIT/ML injection pen INJECT 20 UNITS IN THE MORNING AND 10 UNITS IN THE EVENING *Dose change* 15 pen 3    Glucagon (BAQSIMI TWO PACK) 3 MG/DOSE POWD Instill contents of 1 device (3 mg) intranasally for treatment of low blood sugar. May repeat after 15 minutes if no response. 1 each 0    levothyroxine (SYNTHROID) 137 MCG tablet Take 1 tablet by mouth daily 1 tablet daily and 1.5 tablet on sunday 96 tablet 2    diclofenac (VOLTAREN) 75 MG EC tablet       Lancets MISC Use to test blood sugars 4 times daily and PRN .  DX:E10.9 400 each 3    midodrine (PROAMATINE) 5 MG tablet Take 5 mg by mouth 3 times daily      Cholecalciferol (VITAMIN D3) 25 MCG (1000 UT) CAPS Take by mouth daily      Nutritional Supplements (ESTROVEN PO) Take by mouth daily      ezetimibe (ZETIA) 10 MG tablet Take 10 mg by mouth daily      vitamin E 400 UNIT capsule Take 400 Units by mouth daily      PARoxetine (PAXIL) 10 MG tablet Take 10 mg by mouth every morning      Omega 3 1200 MG CAPS Take 1,200 capsules by mouth daily      brimonidine-timolol (COMBIGAN) 0.2-0.5 % ophthalmic solution Place 1 drop into both eyes 3 times daily       calcium carbonate (TUMS CALCIUM FOR LIFE BONE) 750 MG chewable tablet Take 1 tablet by mouth daily.  aspirin 81 MG tablet Take 81 mg by mouth daily.  therapeutic multivitamin-minerals (THERAGRAN-M) tablet Take 1 tablet by mouth daily.  Ascorbic Acid (VITAMIN C) 500 MG tablet Take 1,000 mg by mouth daily.  [DISCONTINUED] Multiple Vitamins-Minerals (HAIR SKIN AND NAILS FORMULA PO) Take 1 tablet by mouth daily       No facility-administered encounter medications on file as of 6/20/2022. Lab Results   Component Value Date     06/15/2022    K 5.4 06/15/2022     06/15/2022    CO2 26 06/15/2022    BUN 17 06/15/2022    CREATININE 1.1 06/15/2022    GLUCOSE 263 06/15/2022    GLUCOSE 230 10/26/2015    CALCIUM 9.1 06/15/2022         Controlled Substances Monitoring: Will schedule follow up appointment in  3-4  months. Plan:    Type - 1 DM - taking humalog and lantus today's A1c was high and needs to take 3-4  insulin injections /day ,   Will increase basaglar  25  units am and 10 U PM.   Our diabetic clinic is working with her ins co , re CGM. She claims her insurance does not cover  CDE. , if sugars are hovering 150-200 during the day she can increase am to 24 units, after 7-10 days and call us with an update. Due to diabetic retinopathy and glaucoma she gets eye exam  Q 3 months. Further increases of insulin as needed. She got her Pammohinder Gilliland   And needs it set up, so will ask Ethel Hernandez to assist her. Will initiate her   On low dose lisinopril 5 mg daily . Dyslipidemia - can't tolerate STATINS, on zetia ,  Recent labs noted. LDL is 123, and TC  217,  , she could not   Tolerate statins, various one. She just does not want to go back. Hypothyroidism - s/p thyroidectomy , due to nodule, no cancer. On supplements, with normal TSH so no change in thryoid meds.    Re check TSH and T4 pre next visit    Obesity dietary modification lifestyle changes have been recommended, she is seen a dietitian in the past claims her insurance company does not pay for any further visits. Recent labs are good, which were reviewed with the pt today . Education material re DM, dietary changes and regular exercise was recommendations, along with healthy life style       RTO 3-4  months. , re check lipids and ALT and AST along with BMP    Signed by: Manpreet Loepz M.D.     [address] [facility] Internal [facility]  [address], Counts include 234 beds at the [facility] [address], [facility]    Tel  [phone]   Fax [phone]

## 2022-06-27 ENCOUNTER — HOSPITAL ENCOUNTER (OUTPATIENT)
Age: 67
Discharge: HOME | End: 2022-06-27
Payer: COMMERCIAL

## 2022-06-27 LAB
ALT SERPL-CCNC: 107 U/L (ref 11–66)
ANION GAP SERPL CALCULATED.3IONS-SCNC: 10 MEQ/L (ref 8–16)
AST SERPL-CCNC: 89 U/L (ref 5–40)
BUN BLDV-MCNC: 14 MG/DL (ref 7–22)
CALCIUM SERPL-MCNC: 9.3 MG/DL (ref 8.5–10.5)
CHLORIDE BLD-SCNC: 102 MEQ/L (ref 98–111)
CO2: 26 MEQ/L (ref 23–33)
CREAT SERPL-MCNC: 0.8 MG/DL (ref 0.4–1.2)
GFR SERPL CREATININE-BSD FRML MDRD: 72 ML/MIN/1.73M2
GLUCOSE BLD-MCNC: 174 MG/DL (ref 70–108)
POTASSIUM SERPL-SCNC: 5.1 MEQ/L (ref 3.5–5.2)
SODIUM BLD-SCNC: 138 MEQ/L (ref 135–145)

## 2022-06-27 PROCEDURE — 80048 BASIC METABOLIC PNL TOTAL CA: CPT

## 2022-06-27 PROCEDURE — 36415 COLL VENOUS BLD VENIPUNCTURE: CPT

## 2022-06-27 PROCEDURE — 84460 ALANINE AMINO (ALT) (SGPT): CPT

## 2022-06-27 PROCEDURE — 84450 TRANSFERASE (AST) (SGOT): CPT

## 2022-07-18 RX ORDER — LISINOPRIL 5 MG/1
5 TABLET ORAL DAILY
Qty: 90 TABLET | Refills: 1 | Status: SHIPPED | OUTPATIENT
Start: 2022-07-18

## 2022-08-03 ENCOUNTER — TELEPHONE (OUTPATIENT)
Dept: INTERNAL MEDICINE CLINIC | Age: 67
End: 2022-08-03

## 2022-08-03 NOTE — TELEPHONE ENCOUNTER
Patient calls and states ever since she has taken both the Midodrine and Lisinopril, she has had episodes of almost fainting or very briefly blacking out. She says Dr. Julien Vance gave her the Midodrine and  gave the Lisinopril. She wants to know if she can stop the Lisinopril because of the episodes of nearly fainting.

## 2022-08-04 NOTE — TELEPHONE ENCOUNTER
Tanisha Rodriguez she can stop the lisinopril , which was added low dose due to elevated BP. Also she can try to take the proamatine twice a day vs 3 times per day , as BP's are mildly elevated.        Electronically signed by Aden Alvarado MD on 8/3/2022 at 9:43 PM

## 2022-10-20 ENCOUNTER — OFFICE VISIT (OUTPATIENT)
Dept: INTERNAL MEDICINE CLINIC | Age: 67
End: 2022-10-20
Payer: COMMERCIAL

## 2022-10-20 VITALS
WEIGHT: 221.8 LBS | TEMPERATURE: 97.3 F | SYSTOLIC BLOOD PRESSURE: 132 MMHG | DIASTOLIC BLOOD PRESSURE: 84 MMHG | HEART RATE: 78 BPM | BODY MASS INDEX: 32.75 KG/M2 | OXYGEN SATURATION: 98 %

## 2022-10-20 DIAGNOSIS — E10.8 DM (DIABETES MELLITUS), TYPE 1 WITH COMPLICATIONS (HCC): Primary | ICD-10-CM

## 2022-10-20 DIAGNOSIS — E10.65 TYPE 1 DIABETES MELLITUS WITH HYPERGLYCEMIA (HCC): ICD-10-CM

## 2022-10-20 DIAGNOSIS — E78.00 PURE HYPERCHOLESTEROLEMIA: ICD-10-CM

## 2022-10-20 DIAGNOSIS — E89.0 STATUS POST TOTAL THYROIDECTOMY: ICD-10-CM

## 2022-10-20 LAB — HBA1C MFR BLD: 8.7 % (ref 4.3–5.7)

## 2022-10-20 PROCEDURE — 3052F HG A1C>EQUAL 8.0%<EQUAL 9.0%: CPT | Performed by: INTERNAL MEDICINE

## 2022-10-20 PROCEDURE — 99213 OFFICE O/P EST LOW 20 MIN: CPT | Performed by: INTERNAL MEDICINE

## 2022-10-20 PROCEDURE — 83036 HEMOGLOBIN GLYCOSYLATED A1C: CPT | Performed by: INTERNAL MEDICINE

## 2022-10-20 PROCEDURE — 1123F ACP DISCUSS/DSCN MKR DOCD: CPT | Performed by: INTERNAL MEDICINE

## 2022-10-20 RX ORDER — LANCETS 30 GAUGE
EACH MISCELLANEOUS
Qty: 400 EACH | Refills: 3 | Status: SHIPPED | OUTPATIENT
Start: 2022-10-20

## 2022-10-20 RX ORDER — LEVOTHYROXINE SODIUM 137 UG/1
137 TABLET ORAL DAILY
Qty: 96 TABLET | Refills: 2 | Status: SHIPPED | OUTPATIENT
Start: 2022-10-20

## 2022-10-20 RX ORDER — INSULIN LISPRO 100 [IU]/ML
INJECTION, SOLUTION INTRAVENOUS; SUBCUTANEOUS
Qty: 15 ADJUSTABLE DOSE PRE-FILLED PEN SYRINGE | Refills: 2 | Status: SHIPPED | OUTPATIENT
Start: 2022-10-20

## 2022-10-20 RX ORDER — INSULIN GLARGINE 100 [IU]/ML
INJECTION, SOLUTION SUBCUTANEOUS
Qty: 15 ADJUSTABLE DOSE PRE-FILLED PEN SYRINGE | Refills: 2 | Status: SHIPPED | OUTPATIENT
Start: 2022-10-20

## 2022-10-20 NOTE — PROGRESS NOTES
[facility] Internal Medicine   [facility] [address], [address]  Dept: [phone]  Dept Fax: [phone]    YOB: 1955    Type I diabetes mellitus/ dyslipidemia    74 y.o. female   here for follow-up of above issues. And does see Bernarda Mann CNP for medical issues here in town were following up mainly  diabetes and the dyslipidemia. She had  thyroidectomy and now has hypothyroidism for which she is taking supplements. Diagnosed with DM back on 2000, strong family hx of DM. She is an obese WF not in distress, she does have retinopathy , followed by ophthalmology Q 6 months. No CAD, seen dr. Jeramy Johnson. Her  A1c today 8.7 and 9 in the past , it was  8.5 11/22/21 . Got the covid vaccine along the booster,    She does have chronic hip pain bilaterally. No recent hospitalization. Pt admits at times she Forgets her insulin occasionally, I RECOMMEND SHE PUTS AN ALERTS ON THE PHONE OR TIMERS ETC.  Claims she is under a lots of stress, due to her family issues with her parents. No LOC and did check her  sugars range. We downloaded her Ada Pelayo over the last 30 days , average is 206, and only 42 % in the target range of 70 - 180 , high to very high 58% of the time. She claims she is under a lot of stress with her mother's illness , in the hospital, she was at [facility], had a fall. Pt had a spontaneous   Fracture of right 3 rd toe, and getting a dexascan, and seen [facility] staff. No recent falls no fever or chills. Current Outpatient Medications   Medication Sig Dispense Refill    lisinopril (PRINIVIL;ZESTRIL) 5 MG tablet Take 1 tablet by mouth in the morning. 90 tablet 1    bimatoprost (LUMIGAN) 0.03 % ophthalmic drops       insulin lispro, 1 Unit Dial, (HUMALOG KWIKPEN) 100 UNIT/ML SOPN 10 units a day plus sliding scale. Max daily dose  70 units.  15 pen 2    blood glucose monitor kit and supplies Dispense sufficient amount for indicated testing frequency plus additional to accommodate PRN testing needs. Dispense all needed supplies to include: monitor, strips, lancing device, lancets, control solutions, alcohol swabs. One Touch or Accucheck meter. E11.9 1 kit 0    blood glucose monitor strips Test 4 times a day & as needed for symptoms of irregular blood glucose. Dispense sufficient amount for indicated testing frequency plus additional to accommodate PRN testing needs. OneTouch or Accucheck test strips. E11.9 400 strip 2    insulin glargine (LANTUS SOLOSTAR) 100 UNIT/ML injection pen INJECT 20 UNITS IN THE MORNING AND 10 UNITS IN THE EVENING *Dose change* 15 pen 3    Glucagon (BAQSIMI TWO PACK) 3 MG/DOSE POWD Instill contents of 1 device (3 mg) intranasally for treatment of low blood sugar. May repeat after 15 minutes if no response. 1 each 0    levothyroxine (SYNTHROID) 137 MCG tablet Take 1 tablet by mouth daily 1 tablet daily and 1.5 tablet on sunday 96 tablet 2    diclofenac (VOLTAREN) 75 MG EC tablet       Lancets MISC Use to test blood sugars 4 times daily and PRN . DX:E10.9 400 each 3    midodrine (PROAMATINE) 5 MG tablet Take 5 mg by mouth 3 times daily      Cholecalciferol (VITAMIN D3) 25 MCG (1000 UT) CAPS Take by mouth daily      Nutritional Supplements (ESTROVEN PO) Take by mouth daily      ezetimibe (ZETIA) 10 MG tablet Take 10 mg by mouth daily      vitamin E 400 UNIT capsule Take 400 Units by mouth daily      PARoxetine (PAXIL) 10 MG tablet Take 10 mg by mouth every morning      Omega 3 1200 MG CAPS Take 1,200 capsules by mouth daily      brimonidine-timolol (COMBIGAN) 0.2-0.5 % ophthalmic solution Place 1 drop into both eyes 3 times daily       calcium carbonate (TUMS CALCIUM FOR LIFE BONE) 750 MG chewable tablet Take 1 tablet by mouth daily. aspirin 81 MG tablet Take 81 mg by mouth daily. therapeutic multivitamin-minerals (THERAGRAN-M) tablet Take 1 tablet by mouth daily.       Ascorbic Acid (VITAMIN C) 500 MG tablet Take 1,000 mg by mouth daily. No current facility-administered medications for this visit.        Past Medical History:   Diagnosis Date    Depression     Fibromyalgia     Glaucoma     Hyperthyroidism     Meningitis 10-    viral     Meningitis spinal 10/04/2013    Viral    Osteoarthritis     Type I (juvenile type) diabetes mellitus without mention of complication, not stated as uncontrolled 2000       Social History     Socioeconomic History    Marital status:      Spouse name: Not on file    Number of children: Not on file    Years of education: Not on file    Highest education level: Not on file   Occupational History    Occupation: [employer]   Tobacco Use    Smoking status: Never    Smokeless tobacco: Never   Substance and Sexual Activity    Alcohol use: No     Alcohol/week: 0.0 standard drinks     Comment: rarely    Drug use: No    Sexual activity: Not on file   Other Topics Concern    Not on file   Social History Narrative    Not on file     Social Determinants of Health     Financial Resource Strain: Low Risk     Difficulty of Paying Living Expenses: Not hard at all   Food Insecurity: No Food Insecurity    Worried About Running Out of Food in the Last Year: Never true    Ran Out of Food in the Last Year: Never true   Transportation Needs: Not on file   Physical Activity: Not on file   Stress: Not on file   Social Connections: Not on file   Intimate Partner Violence: Not on file   Housing Stability: Not on file       Family History   Problem Relation Age of Onset    Thyroid Disease Mother     High Cholesterol Mother     Diabetes Father     Other Father         arachnoid cyst    Other Brother         arachnoid cyst       Allergies   Allergen Reactions    Estrogens Other (See Comments)    Milk-Related Compounds Other (See Comments)     Severe constipation    Statins      myalgia    Adhesive Tape Rash    Lactose Other (See Comments)    Pcn [Penicillins] Rash       Review of Systems     See HPI     BP 132/84 (Site: Right Upper Arm, Position: Sitting)   Pulse 78   Temp 97.3 °F (36.3 °C)   Wt 221 lb 12.8 oz (100.6 kg)   LMP 01/21/2013   SpO2 98%   BMI 32.75 kg/m²      Physical Examination: General appearance - patient alert, well appearing, and in no distress, she is obese WF . Mental status - alert and oriented    Neck - supple, no significant adenopathy  Chest - clear to auscultation, no wheezes, rales or rhonchi, symmetric air entry  Heart - normal rate, regular rhythm, normal S1, S2, no murmurs, rubs, clicks or gallops  Abdomen - soft, nontender, nondistended, no masses or organomegaly  no abdominal bruits. Obese Protuberant: No  Neurological - alert, oriented, normal speech, no focal findings or movement disorder noted, motor and sensory grossly normal bilaterally  Musculoskeletal - no joint tenderness, deformity or swelling  Extremities - peripheral pulses normal, trace pedal edema, no clubbing or cyanosis, Tess's sign negative bilaterally  Prosthesis: No  Skin - normal coloration and turgor, no rashes, no suspicious skin lesions noted      I have reviewed recent diagnostic testing including labs,     Orders Placed This Encounter   Procedures    POCT glycosylated hemoglobin (Hb A1C)       Outpatient Encounter Medications as of 10/20/2022   Medication Sig Dispense Refill    lisinopril (PRINIVIL;ZESTRIL) 5 MG tablet Take 1 tablet by mouth in the morning. 90 tablet 1    bimatoprost (LUMIGAN) 0.03 % ophthalmic drops       insulin lispro, 1 Unit Dial, (HUMALOG KWIKPEN) 100 UNIT/ML SOPN 10 units a day plus sliding scale. Max daily dose  70 units. 15 pen 2    blood glucose monitor kit and supplies Dispense sufficient amount for indicated testing frequency plus additional to accommodate PRN testing needs. Dispense all needed supplies to include: monitor, strips, lancing device, lancets, control solutions, alcohol swabs. One Touch or Accucheck meter.  E11.9 1 kit 0    blood glucose monitor strips Test 4 times a day & as needed for symptoms of irregular blood glucose. Dispense sufficient amount for indicated testing frequency plus additional to accommodate PRN testing needs. OneTouch or Accucheck test strips. E11.9 400 strip 2    insulin glargine (LANTUS SOLOSTAR) 100 UNIT/ML injection pen INJECT 20 UNITS IN THE MORNING AND 10 UNITS IN THE EVENING *Dose change* 15 pen 3    Glucagon (BAQSIMI TWO PACK) 3 MG/DOSE POWD Instill contents of 1 device (3 mg) intranasally for treatment of low blood sugar. May repeat after 15 minutes if no response. 1 each 0    levothyroxine (SYNTHROID) 137 MCG tablet Take 1 tablet by mouth daily 1 tablet daily and 1.5 tablet on sunday 96 tablet 2    diclofenac (VOLTAREN) 75 MG EC tablet       Lancets MISC Use to test blood sugars 4 times daily and PRN . DX:E10.9 400 each 3    midodrine (PROAMATINE) 5 MG tablet Take 5 mg by mouth 3 times daily      Cholecalciferol (VITAMIN D3) 25 MCG (1000 UT) CAPS Take by mouth daily      Nutritional Supplements (ESTROVEN PO) Take by mouth daily      ezetimibe (ZETIA) 10 MG tablet Take 10 mg by mouth daily      vitamin E 400 UNIT capsule Take 400 Units by mouth daily      PARoxetine (PAXIL) 10 MG tablet Take 10 mg by mouth every morning      Omega 3 1200 MG CAPS Take 1,200 capsules by mouth daily      brimonidine-timolol (COMBIGAN) 0.2-0.5 % ophthalmic solution Place 1 drop into both eyes 3 times daily       calcium carbonate (TUMS CALCIUM FOR LIFE BONE) 750 MG chewable tablet Take 1 tablet by mouth daily. aspirin 81 MG tablet Take 81 mg by mouth daily. therapeutic multivitamin-minerals (THERAGRAN-M) tablet Take 1 tablet by mouth daily. Ascorbic Acid (VITAMIN C) 500 MG tablet Take 1,000 mg by mouth daily. No facility-administered encounter medications on file as of 10/20/2022.         Lab Results   Component Value Date/Time     06/27/2022 10:28 AM    K 5.1 06/27/2022 10:28 AM     06/27/2022 10:28 AM    CO2 26 06/27/2022 10:28 AM BUN 14 06/27/2022 10:28 AM    CREATININE 0.8 06/27/2022 10:28 AM    GLUCOSE 174 06/27/2022 10:28 AM    GLUCOSE 230 10/26/2015 09:24 AM    CALCIUM 9.3 06/27/2022 10:28 AM         Controlled Substances Monitoring: Will schedule follow up appointment in  3-4  months. Plan:    Type - 1 DM - taking humalog and lantus today's A1c was high and needs to take 3-4  insulin injections /day ,   Will increase basaglar  25  units am and 10 U PM.   Our diabetic clinic is working with her ins co , re CGM. She claims her insurance does not cover  CDE. , if sugars are hovering 150-200 during the day she can increase the lantus  am to 24 units,  and 12 units PM after 7-10 days and call us with an update. Due to diabetic retinopathy and glaucoma she gets eye exam  Q 3 months. Further increases of insulin as needed. She got her Radha Shmuel   And needs it set up, so will ask Teresa Puente to assist her. Will initiate her   On low dose lisinopril 5 mg daily , and check urine for proteinuria . She claims her insurance does not cover diabetic clinic. Dyslipidemia - can't tolerate STATINS, on zetia ,  Recent labs noted from 6/22 ,  she could not   Tolerate statins, various one. She just does not want to go back. Hypothyroidism - s/p thyroidectomy , due to nodule, no cancer. On supplements, with normal TSH so no change in thryoid meds. Re check TSH and T4 pre next visit    Obesity dietary modification lifestyle changes have been recommended, she is seen a dietitian in the past claims her insurance company does not pay for any further visits. Recent labs are good, which were reviewed with the pt today . Patient is checking blood sugars  3-4 times daily. No episodes of hypoglycemia. Most recent eye exam: done ~ 2 months ago. DM retinopathy - yes  Microalbumin: negative, will re check soon   Last Foot Exam: recently by podiatrist . Patient denies foot lesions, neuropathy.   Patient is using insulin: yes- has 4-5 daily insulin injections        Education material re DM, dietary changes and regular exercise was recommendations, along with healthy life style       RTO 4  months. , re check lipids and ALT and AST along with BMP    Signed by: Ike Bryant M.D.     [address] [facility] Internal Medicine  [address] [facility] [address], [facility] [address], [address]    Tel  [phone]   Fax [phone]

## 2023-03-30 RX ORDER — INSULIN LISPRO 100 [IU]/ML
INJECTION, SOLUTION INTRAVENOUS; SUBCUTANEOUS
Qty: 75 ML | Refills: 3 | Status: SHIPPED | OUTPATIENT
Start: 2023-03-30

## 2023-03-30 NOTE — TELEPHONE ENCOUNTER
Refill request received    Last ordered 10/20/22, disp 15 pens, refill 2    Date of last visit 10/20  Date of next visit 5/18/22

## 2023-04-24 ENCOUNTER — TELEPHONE (OUTPATIENT)
Dept: INTERNAL MEDICINE CLINIC | Age: 68
End: 2023-04-24

## 2023-04-24 NOTE — TELEPHONE ENCOUNTER
Pt called in upset because [facility] has not sent her quan sensors and she was supposed to change it today. She is checking her sugars manually however. I called [facility] and inquired what information they need. They need a doctor's note within 6 months in order to send her Glendy Elza supplies. I called pt and advised her of this. I offered appt with resident clinic or can wait until her appt in 5/18/23 with Dr Andrew Mistry. Pt says she will wait until appt with Dr Andrew Mistry and I made a note on her appt desk  to fax office note to [facility] in regard to Glendy Elza sensors.

## 2023-05-18 ENCOUNTER — TELEPHONE (OUTPATIENT)
Dept: PHARMACY | Facility: CLINIC | Age: 68
End: 2023-05-18

## 2023-05-18 ENCOUNTER — OFFICE VISIT (OUTPATIENT)
Dept: INTERNAL MEDICINE CLINIC | Age: 68
End: 2023-05-18
Payer: MEDICARE

## 2023-05-18 ENCOUNTER — APPOINTMENT (OUTPATIENT)
Dept: GENERAL RADIOLOGY | Age: 68
End: 2023-05-18
Payer: MEDICARE

## 2023-05-18 ENCOUNTER — HOSPITAL ENCOUNTER (EMERGENCY)
Age: 68
Discharge: HOME | End: 2023-05-18
Attending: EMERGENCY MEDICINE
Payer: MEDICARE

## 2023-05-18 VITALS
BODY MASS INDEX: 31.99 KG/M2 | OXYGEN SATURATION: 95 % | SYSTOLIC BLOOD PRESSURE: 114 MMHG | HEART RATE: 76 BPM | WEIGHT: 216.6 LBS | TEMPERATURE: 98.1 F | DIASTOLIC BLOOD PRESSURE: 66 MMHG | RESPIRATION RATE: 18 BRPM

## 2023-05-18 VITALS
HEART RATE: 70 BPM | HEIGHT: 69 IN | DIASTOLIC BLOOD PRESSURE: 74 MMHG | RESPIRATION RATE: 22 BRPM | OXYGEN SATURATION: 95 % | WEIGHT: 216 LBS | BODY MASS INDEX: 31.99 KG/M2 | SYSTOLIC BLOOD PRESSURE: 133 MMHG | TEMPERATURE: 98.2 F

## 2023-05-18 DIAGNOSIS — E86.0 DEHYDRATION: ICD-10-CM

## 2023-05-18 DIAGNOSIS — E78.00 PURE HYPERCHOLESTEROLEMIA: ICD-10-CM

## 2023-05-18 DIAGNOSIS — R55 PRE-SYNCOPE: Primary | ICD-10-CM

## 2023-05-18 DIAGNOSIS — I95.1 ORTHOSTATIC HYPOTENSION: Primary | ICD-10-CM

## 2023-05-18 DIAGNOSIS — E10.8 DM (DIABETES MELLITUS), TYPE 1 WITH COMPLICATIONS (HCC): ICD-10-CM

## 2023-05-18 LAB
ANION GAP SERPL CALC-SCNC: 14 MEQ/L (ref 8–16)
BASOPHILS ABSOLUTE: 0.1 THOU/MM3 (ref 0–0.1)
BASOPHILS NFR BLD AUTO: 0.7 %
BUN SERPL-MCNC: 26 MG/DL (ref 7–22)
CALCIUM SERPL-MCNC: 9.2 MG/DL (ref 8.5–10.5)
CHLORIDE SERPL-SCNC: 99 MEQ/L (ref 98–111)
CO2 SERPL-SCNC: 23 MEQ/L (ref 23–33)
CREAT SERPL-MCNC: 1 MG/DL (ref 0.4–1.2)
D DIMER PPP IA.FEU-MCNC: 468 NG/ML FEU (ref 0–500)
DEPRECATED RDW RBC AUTO: 43.6 FL (ref 35–45)
EOSINOPHIL NFR BLD AUTO: 1.9 %
EOSINOPHILS ABSOLUTE: 0.1 THOU/MM3 (ref 0–0.4)
ERYTHROCYTE [DISTWIDTH] IN BLOOD BY AUTOMATED COUNT: 13.1 % (ref 11.5–14.5)
GFR SERPL CREATININE-BSD FRML MDRD: > 60 ML/MIN/1.73M2
GLUCOSE SERPL-MCNC: 254 MG/DL (ref 70–108)
HBA1C MFR BLD: 8.1 % (ref 4.3–5.7)
HCT VFR BLD AUTO: 38.7 % (ref 37–47)
HGB BLD-MCNC: 12.3 GM/DL (ref 12–16)
IMM GRANULOCYTES # BLD AUTO: 0.02 THOU/MM3 (ref 0–0.07)
IMM GRANULOCYTES NFR BLD AUTO: 0.3 %
LYMPHOCYTES ABSOLUTE: 1.9 THOU/MM3 (ref 1–4.8)
LYMPHOCYTES NFR BLD AUTO: 26.1 %
MCH RBC QN AUTO: 29.1 PG (ref 26–33)
MCHC RBC AUTO-ENTMCNC: 31.8 GM/DL (ref 32.2–35.5)
MCV RBC AUTO: 91.5 FL (ref 81–99)
MONOCYTES ABSOLUTE: 0.9 THOU/MM3 (ref 0.4–1.3)
MONOCYTES NFR BLD AUTO: 12 %
NEUTROPHILS NFR BLD AUTO: 59 %
NRBC BLD AUTO-RTO: 0 /100 WBC
NT-PROBNP SERPL IA-MCNC: 48.7 PG/ML (ref 0–124)
OSMOLALITY SERPL CALC.SUM OF ELEC: 285.4 MOSMOL/KG (ref 275–300)
PLATELET # BLD AUTO: 277 THOU/MM3 (ref 130–400)
PMV BLD AUTO: 9.7 FL (ref 9.4–12.4)
POTASSIUM SERPL-SCNC: 4.2 MEQ/L (ref 3.5–5.2)
RBC # BLD AUTO: 4.23 MILL/MM3 (ref 4.2–5.4)
SEGMENTED NEUTROPHILS ABSOLUTE COUNT: 4.4 THOU/MM3 (ref 1.8–7.7)
SODIUM SERPL-SCNC: 136 MEQ/L (ref 135–145)
T4 FREE SERPL-MCNC: 1.11 NG/DL (ref 0.93–1.76)
TROPONIN T: < 0.01 NG/ML
TSH SERPL DL<=0.005 MIU/L-ACNC: 5.59 UIU/ML (ref 0.4–4.2)
WBC # BLD AUTO: 7.4 THOU/MM3 (ref 4.8–10.8)

## 2023-05-18 PROCEDURE — 84443 ASSAY THYROID STIM HORMONE: CPT

## 2023-05-18 PROCEDURE — 85379 FIBRIN DEGRADATION QUANT: CPT

## 2023-05-18 PROCEDURE — 84439 ASSAY OF FREE THYROXINE: CPT

## 2023-05-18 PROCEDURE — 83036 HEMOGLOBIN GLYCOSYLATED A1C: CPT | Performed by: INTERNAL MEDICINE

## 2023-05-18 PROCEDURE — 83880 ASSAY OF NATRIURETIC PEPTIDE: CPT

## 2023-05-18 PROCEDURE — 85025 COMPLETE CBC W/AUTO DIFF WBC: CPT

## 2023-05-18 PROCEDURE — 99285 EMERGENCY DEPT VISIT HI MDM: CPT

## 2023-05-18 PROCEDURE — 96361 HYDRATE IV INFUSION ADD-ON: CPT

## 2023-05-18 PROCEDURE — 93005 ELECTROCARDIOGRAM TRACING: CPT | Performed by: STUDENT IN AN ORGANIZED HEALTH CARE EDUCATION/TRAINING PROGRAM

## 2023-05-18 PROCEDURE — 99214 OFFICE O/P EST MOD 30 MIN: CPT | Performed by: INTERNAL MEDICINE

## 2023-05-18 PROCEDURE — 96360 HYDRATION IV INFUSION INIT: CPT

## 2023-05-18 PROCEDURE — 36415 COLL VENOUS BLD VENIPUNCTURE: CPT

## 2023-05-18 PROCEDURE — 93000 ELECTROCARDIOGRAM COMPLETE: CPT | Performed by: INTERNAL MEDICINE

## 2023-05-18 PROCEDURE — 1123F ACP DISCUSS/DSCN MKR DOCD: CPT | Performed by: INTERNAL MEDICINE

## 2023-05-18 PROCEDURE — 71046 X-RAY EXAM CHEST 2 VIEWS: CPT

## 2023-05-18 PROCEDURE — 3074F SYST BP LT 130 MM HG: CPT | Performed by: INTERNAL MEDICINE

## 2023-05-18 PROCEDURE — 80048 BASIC METABOLIC PNL TOTAL CA: CPT

## 2023-05-18 PROCEDURE — 84484 ASSAY OF TROPONIN QUANT: CPT

## 2023-05-18 PROCEDURE — 3052F HG A1C>EQUAL 8.0%<EQUAL 9.0%: CPT | Performed by: INTERNAL MEDICINE

## 2023-05-18 PROCEDURE — 2580000003 HC RX 258: Performed by: STUDENT IN AN ORGANIZED HEALTH CARE EDUCATION/TRAINING PROGRAM

## 2023-05-18 PROCEDURE — 3078F DIAST BP <80 MM HG: CPT | Performed by: INTERNAL MEDICINE

## 2023-05-18 RX ORDER — 0.9 % SODIUM CHLORIDE 0.9 %
1000 INTRAVENOUS SOLUTION INTRAVENOUS ONCE
Status: COMPLETED | OUTPATIENT
Start: 2023-05-18 | End: 2023-05-18

## 2023-05-18 RX ORDER — PAROXETINE HYDROCHLORIDE 20 MG/1
TABLET, FILM COATED ORAL
COMMUNITY
Start: 2023-04-12

## 2023-05-18 RX ORDER — PROPAFENONE HYDROCHLORIDE 150 MG/1
150 TABLET, COATED ORAL 2 TIMES DAILY
COMMUNITY
Start: 2023-05-15

## 2023-05-18 RX ADMIN — SODIUM CHLORIDE 1000 ML: 9 INJECTION, SOLUTION INTRAVENOUS at 16:58

## 2023-05-18 SDOH — ECONOMIC STABILITY: INCOME INSECURITY: HOW HARD IS IT FOR YOU TO PAY FOR THE VERY BASICS LIKE FOOD, HOUSING, MEDICAL CARE, AND HEATING?: NOT HARD AT ALL

## 2023-05-18 SDOH — ECONOMIC STABILITY: HOUSING INSECURITY
IN THE LAST 12 MONTHS, WAS THERE A TIME WHEN YOU DID NOT HAVE A STEADY PLACE TO SLEEP OR SLEPT IN A SHELTER (INCLUDING NOW)?: NO

## 2023-05-18 SDOH — ECONOMIC STABILITY: FOOD INSECURITY: WITHIN THE PAST 12 MONTHS, THE FOOD YOU BOUGHT JUST DIDN'T LAST AND YOU DIDN'T HAVE MONEY TO GET MORE.: NEVER TRUE

## 2023-05-18 SDOH — ECONOMIC STABILITY: FOOD INSECURITY: WITHIN THE PAST 12 MONTHS, YOU WORRIED THAT YOUR FOOD WOULD RUN OUT BEFORE YOU GOT MONEY TO BUY MORE.: NEVER TRUE

## 2023-05-18 ASSESSMENT — PATIENT HEALTH QUESTIONNAIRE - PHQ9
SUM OF ALL RESPONSES TO PHQ9 QUESTIONS 1 & 2: 0
SUM OF ALL RESPONSES TO PHQ QUESTIONS 1-9: 0
2. FEELING DOWN, DEPRESSED OR HOPELESS: 0
1. LITTLE INTEREST OR PLEASURE IN DOING THINGS: 0
SUM OF ALL RESPONSES TO PHQ QUESTIONS 1-9: 0

## 2023-05-18 ASSESSMENT — PAIN - FUNCTIONAL ASSESSMENT: PAIN_FUNCTIONAL_ASSESSMENT: NONE - DENIES PAIN

## 2023-05-18 NOTE — ED PROVIDER NOTES
I performed a history and physical examination of the patient and discussed management with the resident. I reviewed the residents note and agree with the documented findings and plan of care. Any areas of disagreement are noted on the chart. I was personally present for the key portions of any procedures. I have documented in the chart those procedures where I was not present during the key portions. I have reviewed the emergency nurses triage note. I agree with the chief complaint, past medical history, past surgical history, allergies, medications, social and family history as documented unless otherwise noted below. Documentation of the HPI, Physical Exam and Medical Decision Making performed by medical students or scribes is based on my personal performance of the HPI, PE and MDM. For Phys Assistant/ Nurse Practitioner cases/documentation I have personally evaluated this patient and have completed at least one if not all key elements of the E/M (history, physical exam, and MDM). My findings are as noted below. In other words, I personally saw and examined the patient I have reviewed and agreed with the resident findings including all diagnostic interpretations and treatment plans as written. I was present for the key portion of any procedures performed and the inclusive time noted in any critical care statement. Patient presents today for syncopal episode: Patient has been having syncopal episodes since she was young. Patient is on midodrine. She has been worked up by cardiology at [facility]. She is never seen neurology. Patient states that she had an episode in her primary care physician's office and decided to come in. Patient states that since November she has had COVID at that time she has had worsening of the symptoms. Patient also states she has been under tremendous stress. Her parents are getting older and she is having problems with them. Patient denies any overt chest pain.   Patient

## 2023-05-18 NOTE — ED NOTES
Pt to ER due to orthostatic hypotension and dizziness. Pt states she has had this for a long time, but the past few weeks it has worsened.  EKG and labs completed upon pts arrival.      Sai Quiroz RN  05/18/23 [id number]

## 2023-05-18 NOTE — DISCHARGE INSTRUCTIONS
Take your medications as prescribed. Follow up with your primary care doctor in the next 5 days to discuss your results here and for further management. Return to the emergency department for any new or worsening symptoms.

## 2023-05-18 NOTE — ED PROVIDER NOTES
[address]\"" [address] [address] EMERGENCY DEPT      EMERGENCY MEDICINE     Pt Name: Sarah Long  MRN: [id number]  [place] 1955  Date of evaluation: 2023  Provider: Brisa Rodriguez MD  Supervising Physician: Dr Nadya Holguin       Chief Complaint   Patient presents with    Hypotension     HISTORY OF PRESENT ILLNESS   Sarah Long is a 79 y.o. female who presents to the emergency department for worsening orthostatic hypotension and near syncope last 2 weeks as well as significant fatigue and shortness of breath. Orthopnea as well. No significant current chest pain but some intermittent chest pain last couple weeks is nonexertional nonpleuritic in nature    She is at her primary care physician's office when she had a near syncopal episode and sent in for evaluation. Patient's been on midodrine 5 mg 3 times daily has been compliant with her medications. The episodeoccurred when she stood up to walk approximate 20 steps.         PASTMEDICAL HISTORY     Past Medical History:   Diagnosis Date    Depression     Fibromyalgia     Glaucoma     Hyperthyroidism     Meningitis 10-    viral     Meningitis spinal 10/04/2013    Viral    Osteoarthritis     Type I (juvenile type) diabetes mellitus without mention of complication, not stated as uncontrolled        Patient Active Problem List   Diagnosis Code    Mass of neck R22.1    Tonsillar tag J35.8    Allergic rhinitis due to other allergen J30.89    Hypertrophy of nasal turbinates J34.3    Nasal septal deviation J34.2    Hyperthyroidism E05.90    Nontoxic multinodular goiter E04.2    Thyroid mass E07.9    Hypothyroid E03.9    Status post total thyroidectomy E89.0    Hyperlipidemia E78.5    DM (diabetes mellitus), type 1 with complications (HCC) N17.4    Essential hypertension I10    Acute renal insufficiency N28.9     SURGICAL HISTORY       Past Surgical History:   Procedure Laterality Date     SECTION      COLONOSCOPY      EYE SURGERY Left

## 2023-05-18 NOTE — PROGRESS NOTES
COVID, back on march and today she   Does have orthostatic changes in the office. , needs IV hydration,   Labs in the ER. EKG no acute ST changes today in the office. Type - 1 DM - taking humalog and lantus today's A1c was high and needs to take 3-4  insulin injections /day ,   Will increase basaglar  25  units am and 10 U PM.   Our diabetic clinic is working with her ins co , re CGM. She claims her insurance does not cover  CDE. , if sugars are hovering 150-200 during the day she can increase the lantus  am to 24 units,  and 12 units PM after 7-10 days and call us with an update. Due to diabetic retinopathy and glaucoma she gets eye exam  Q 3 months. Further increases of insulin as needed. She got her Jhoana Malika   And needs it set up, so will ask Gregoria Salinas to assist her. Will initiate her   On low dose lisinopril 5 mg daily , and check urine for proteinuria . She claims her insurance does not cover diabetic clinic. Dyslipidemia - can't tolerate STATINS, on zetia ,  Recent labs noted from 6/22 ,  she could not   Tolerate statins, various one. She just does not want to go back. Hypothyroidism - s/p thyroidectomy , due to nodule, no cancer. On supplements, with normal TSH so no change in thryoid meds. Re check TSH and T4 pre next visit    Obesity dietary modification lifestyle changes have been recommended, she is seen a dietitian in the past claims her insurance company does not pay for any further visits. Patient is checking blood sugars  3-4 times daily. No episodes of hypoglycemia. Most recent eye exam: done recently, back last week. DM retinopathy - yes  And has cataract   Microalbumin: negative, will re check soon   Last Foot Exam: recently by podiatrist . Patient denies foot lesions, neuropathy.   Patient is using insulin: yes- has 4-5  daily insulin injections        Education material re DM, dietary changes and regular exercise was recommendations, along with healthy life style

## 2023-05-20 LAB
EKG ATRIAL RATE: 72 BPM
EKG P AXIS: 45 DEGREES
EKG P-R INTERVAL: 162 MS
EKG Q-T INTERVAL: 392 MS
EKG QRS DURATION: 82 MS
EKG QTC CALCULATION (BAZETT): 429 MS
EKG R AXIS: 35 DEGREES
EKG T AXIS: 51 DEGREES
EKG VENTRICULAR RATE: 72 BPM

## 2023-05-22 NOTE — TELEPHONE ENCOUNTER
[facility] CLINICAL PHARMACY REVIEW: STATIN THERAPY    Provider opened and \"doned\" encounter on day after appointment. Exclusion code not added to billable encounter. Patient was sent to ER from IM visit. Added note for next appointment visit.      Erick Beltrán, PharmD, BCACP, [address]   Department, toll free: [phone], option 1    =======================================================    For Pharmacy Admin Tracking Only  Program: [address] in place:  No  Recommendation Provided To: Provider: 1 via Note to Provider  Intervention Accepted By: Provider: 0  Gap Closed?: No   Time Spent (min): 30

## 2023-05-28 DIAGNOSIS — E89.0 STATUS POST TOTAL THYROIDECTOMY: ICD-10-CM

## 2023-05-30 RX ORDER — LEVOTHYROXINE SODIUM 137 UG/1
TABLET ORAL
Qty: 96 TABLET | Refills: 3 | Status: SHIPPED | OUTPATIENT
Start: 2023-05-30

## 2023-05-30 NOTE — TELEPHONE ENCOUNTER
Refill request received.  Patient had labs done 5/18/23    Component Ref Range & Units 5/18/23 1654 6/15/22 0912 4/21/21 1217 3/10/21 1026 1/28/21 0919 7/6/20 0904 1/23/20 1129   TSH 0.400 - 4.200 uIU/mL 5.590 High   1.050 CM  1.070 CM  0.422 CM  0.724 CM  1.860 R, CM       Component Ref Range & Units 5/18/23 1654 6/15/22 0912 4/21/21 1217 1/23/20 1129 10/14/19 1156 5/25/19 0913 1/22/19 1644   T4 Free 0.93 - 1.76 ng/dL 1.11  1.76 CM  1.47 CM  1.32 CM  1.47 CM  1.41

## 2023-06-28 ENCOUNTER — HOSPITAL ENCOUNTER (OUTPATIENT)
Age: 68
Discharge: HOME | End: 2023-06-28
Payer: MEDICARE

## 2023-06-28 DIAGNOSIS — E10.8 DM (DIABETES MELLITUS), TYPE 1 WITH COMPLICATIONS (HCC): ICD-10-CM

## 2023-06-28 LAB
CREAT UR-MCNC: 119.4 MG/DL
MICROALBUMIN UR-MCNC: 2.67 MG/DL
MICROALBUMIN/CREAT RATIO PNL UR: 22 MG/G (ref 0–30)

## 2023-06-28 PROCEDURE — 82043 UR ALBUMIN QUANTITATIVE: CPT

## 2023-07-20 DIAGNOSIS — E10.8 DM (DIABETES MELLITUS), TYPE 1 WITH COMPLICATIONS (HCC): Primary | ICD-10-CM

## 2023-08-30 RX ORDER — APIXABAN 5 MG/1
TABLET, FILM COATED ORAL 2 TIMES DAILY
COMMUNITY
Start: 2023-06-26

## 2023-08-30 RX ORDER — MULTIVIT WITH MINERALS/LUTEIN
1000 TABLET ORAL DAILY
COMMUNITY

## 2023-08-30 RX ORDER — PAROXETINE HYDROCHLORIDE 20 MG/1
TABLET, FILM COATED ORAL DAILY
COMMUNITY
Start: 2023-06-19

## 2023-08-30 RX ORDER — MULTIVIT WITH MINERALS/LUTEIN
250 TABLET ORAL DAILY
COMMUNITY

## 2023-08-30 NOTE — PROGRESS NOTES
NPO after midnight  Bring eye bag from office  Bring insurance info and drivers license  Wear comfortable clean clothing, button down top  Do not bring jewelry  Shower night before and morning of surgery with a liquid antibacterial soap  Bring list of medications with dosage and how often taken  Follow all instructions given by your physician   needed at discharge  Call [phone] for any questions

## 2023-08-30 NOTE — PROGRESS NOTES
In preparation for their surgical procedure above patient was screened for Obstructive Sleep Apnea (HI) using the STOP-Bang Questionnaire by the Pre-Admission Testing department. This is a pre-surgical screening tool for patient safety and serves as a recommendation, this WILL NOT cause cancellation of surgery. STOP-Bang Questionnaire  * Do you currently see a pulmonologist?  No     If yes STOP, do not complete. Patient follows with DrJay     1.  Do you snore loudly (able to be heard in the next room)? No    2. Do you often feel tired or sleepy during the daytime? No       3. Has anyone ever told you that you stop breathing during your sleep? No    4. Do you have or are you being treated for high blood pressure? No      5. BMI more than 35? BMI (Calculated): 32        No    6. Age over 48 years? 79 y.o. Yes    7. Neck Circumference greater than 17 inches for male or 16 inches for female? Measured           (visits only)            Not Applicable    8. Gender Male? No      TOTAL SCORE: 1    HI - Low Risk : Yes to 0 - 2 questions  HI - Intermediate Risk : Yes to 3 - 4 questions  HI - High Risk : Yes to 5 - 8 questions    Adapted from:   STOP Questionnaire: A Tool to Screen Patients for Obstructive Sleep Apnea   NEELA Gutierrez.P.C., Rozina Rodriguez M.B.B.S., Rivas Mcdonnell M.D., Bing Leyva. Benjie Deal, Ph.D., ZO Macdonald.B.B.S., Norbert Garza M.Sc., Yuvonne Holter, M.D., Bridgette Singleton. CARLY CardenasP.C.    Anesthesiology 2008; [id number] Copyright 2008, the [facility] [employer]esthes[employer]ologists, [facility]   ----------------------------------------------------------------------------------------------------------------

## 2023-09-07 ENCOUNTER — ANESTHESIA (OUTPATIENT)
Dept: OPERATING ROOM | Age: 68
End: 2023-09-07
Payer: MEDICARE

## 2023-09-07 ENCOUNTER — HOSPITAL ENCOUNTER (OUTPATIENT)
Age: 68
Setting detail: OUTPATIENT SURGERY
Discharge: HOME | End: 2023-09-07
Attending: OPHTHALMOLOGY | Admitting: OPHTHALMOLOGY
Payer: MEDICARE

## 2023-09-07 ENCOUNTER — ANESTHESIA EVENT (OUTPATIENT)
Dept: OPERATING ROOM | Age: 68
End: 2023-09-07
Payer: MEDICARE

## 2023-09-07 VITALS
HEART RATE: 64 BPM | BODY MASS INDEX: 29.92 KG/M2 | RESPIRATION RATE: 16 BRPM | DIASTOLIC BLOOD PRESSURE: 63 MMHG | HEIGHT: 69 IN | SYSTOLIC BLOOD PRESSURE: 126 MMHG | WEIGHT: 202 LBS | OXYGEN SATURATION: 97 % | TEMPERATURE: 97.2 F

## 2023-09-07 LAB — GLUCOSE BLD STRIP.AUTO-MCNC: 140 MG/DL (ref 70–108)

## 2023-09-07 PROCEDURE — 2709999900 HC NON-CHARGEABLE SUPPLY: Performed by: OPHTHALMOLOGY

## 2023-09-07 PROCEDURE — V2632 POST CHMBR INTRAOCULAR LENS: HCPCS | Performed by: OPHTHALMOLOGY

## 2023-09-07 PROCEDURE — 3700000001 HC ADD 15 MINUTES (ANESTHESIA): Performed by: OPHTHALMOLOGY

## 2023-09-07 PROCEDURE — 2580000003 HC RX 258

## 2023-09-07 PROCEDURE — 82948 REAGENT STRIP/BLOOD GLUCOSE: CPT

## 2023-09-07 PROCEDURE — 7100000010 HC PHASE II RECOVERY - FIRST 15 MIN: Performed by: OPHTHALMOLOGY

## 2023-09-07 PROCEDURE — 3700000000 HC ANESTHESIA ATTENDED CARE: Performed by: OPHTHALMOLOGY

## 2023-09-07 PROCEDURE — 6370000000 HC RX 637 (ALT 250 FOR IP): Performed by: OPHTHALMOLOGY

## 2023-09-07 PROCEDURE — 6360000002 HC RX W HCPCS: Performed by: OPHTHALMOLOGY

## 2023-09-07 PROCEDURE — 3600000003 HC SURGERY LEVEL 3 BASE: Performed by: OPHTHALMOLOGY

## 2023-09-07 PROCEDURE — 7100000011 HC PHASE II RECOVERY - ADDTL 15 MIN: Performed by: OPHTHALMOLOGY

## 2023-09-07 PROCEDURE — 3600000013 HC SURGERY LEVEL 3 ADDTL 15MIN: Performed by: OPHTHALMOLOGY

## 2023-09-07 PROCEDURE — 6360000002 HC RX W HCPCS

## 2023-09-07 PROCEDURE — 2720000010 HC SURG SUPPLY STERILE: Performed by: OPHTHALMOLOGY

## 2023-09-07 PROCEDURE — 2500000003 HC RX 250 WO HCPCS: Performed by: OPHTHALMOLOGY

## 2023-09-07 DEVICE — ACRYSOF(R) IQ ASPHERIC NATURAL IOL, SINGLE-PIECE ACRYLIC FOLDABLE PCL, UV WITH BLUE LIGHTFILTER, 13.0MM LENGTH, 6.0MM ANTERIORASYMMETRIC BICONVEX OPTIC, PLANAR HAPTICS.
Type: IMPLANTABLE DEVICE | Site: EYE | Status: FUNCTIONAL
Brand: ACRYSOF®

## 2023-09-07 RX ORDER — TROPICAMIDE 10 MG/ML
1 SOLUTION/ DROPS OPHTHALMIC EVERY 5 MIN PRN
Status: COMPLETED | OUTPATIENT
Start: 2023-09-07 | End: 2023-09-07

## 2023-09-07 RX ORDER — BUPIVACAINE HYDROCHLORIDE 7.5 MG/ML
1 INJECTION, SOLUTION EPIDURAL; RETROBULBAR EVERY 5 MIN PRN
Status: COMPLETED | OUTPATIENT
Start: 2023-09-07 | End: 2023-09-07

## 2023-09-07 RX ORDER — SODIUM CHLORIDE 9 MG/ML
INJECTION, SOLUTION INTRAVENOUS CONTINUOUS
Status: DISCONTINUED | OUTPATIENT
Start: 2023-09-07 | End: 2023-09-07 | Stop reason: HOSPADM

## 2023-09-07 RX ORDER — KETOROLAC TROMETHAMINE 5 MG/ML
1 SOLUTION OPHTHALMIC EVERY 5 MIN PRN
Status: COMPLETED | OUTPATIENT
Start: 2023-09-07 | End: 2023-09-07

## 2023-09-07 RX ORDER — LIDOCAINE HYDROCHLORIDE 10 MG/ML
INJECTION, SOLUTION EPIDURAL; INFILTRATION; INTRACAUDAL; PERINEURAL PRN
Status: DISCONTINUED | OUTPATIENT
Start: 2023-09-07 | End: 2023-09-07 | Stop reason: ALTCHOICE

## 2023-09-07 RX ORDER — SODIUM CHLORIDE 9 MG/ML
INJECTION, SOLUTION INTRAVENOUS CONTINUOUS PRN
Status: DISCONTINUED | OUTPATIENT
Start: 2023-09-07 | End: 2023-09-07 | Stop reason: SDUPTHER

## 2023-09-07 RX ORDER — KETOROLAC TROMETHAMINE 5 MG/ML
1 SOLUTION OPHTHALMIC EVERY 5 MIN PRN
Status: DISCONTINUED | OUTPATIENT
Start: 2023-09-07 | End: 2023-09-07 | Stop reason: HOSPADM

## 2023-09-07 RX ORDER — BALANCED SALT SOLUTION ENRICHED WITH BICARBONATE, DEXTROSE, AND GLUTATHIONE
KIT INTRAOCULAR PRN
Status: DISCONTINUED | OUTPATIENT
Start: 2023-09-07 | End: 2023-09-07 | Stop reason: ALTCHOICE

## 2023-09-07 RX ORDER — PHENYLEPHRINE HYDROCHLORIDE 25 MG/ML
1 SOLUTION/ DROPS OPHTHALMIC EVERY 5 MIN PRN
Status: COMPLETED | OUTPATIENT
Start: 2023-09-07 | End: 2023-09-07

## 2023-09-07 RX ORDER — MIDAZOLAM HYDROCHLORIDE 1 MG/ML
INJECTION INTRAMUSCULAR; INTRAVENOUS PRN
Status: DISCONTINUED | OUTPATIENT
Start: 2023-09-07 | End: 2023-09-07 | Stop reason: SDUPTHER

## 2023-09-07 RX ADMIN — KETOROLAC TROMETHAMINE 1 DROP: 0.5 SOLUTION OPHTHALMIC at 09:05

## 2023-09-07 RX ADMIN — PHENYLEPHRINE HYDROCHLORIDE 1 DROP: 25 SOLUTION/ DROPS OPHTHALMIC at 09:05

## 2023-09-07 RX ADMIN — PHENYLEPHRINE HYDROCHLORIDE 1 DROP: 25 SOLUTION/ DROPS OPHTHALMIC at 08:55

## 2023-09-07 RX ADMIN — KETOROLAC TROMETHAMINE 1 DROP: 0.5 SOLUTION OPHTHALMIC at 08:55

## 2023-09-07 RX ADMIN — BUPIVACAINE HYDROCHLORIDE 0.38 MG: 7.5 INJECTION, SOLUTION EPIDURAL; RETROBULBAR at 08:55

## 2023-09-07 RX ADMIN — BUPIVACAINE HYDROCHLORIDE 0.38 MG: 7.5 INJECTION, SOLUTION EPIDURAL; RETROBULBAR at 09:05

## 2023-09-07 RX ADMIN — TROPICAMIDE 1 DROP: 10 SOLUTION/ DROPS OPHTHALMIC at 09:05

## 2023-09-07 RX ADMIN — MIDAZOLAM 1 MG: 1 INJECTION INTRAMUSCULAR; INTRAVENOUS at 09:24

## 2023-09-07 RX ADMIN — PHENYLEPHRINE HYDROCHLORIDE 1 DROP: 25 SOLUTION/ DROPS OPHTHALMIC at 09:00

## 2023-09-07 RX ADMIN — TROPICAMIDE 1 DROP: 10 SOLUTION/ DROPS OPHTHALMIC at 08:55

## 2023-09-07 RX ADMIN — SODIUM CHLORIDE: 9 INJECTION, SOLUTION INTRAVENOUS at 09:23

## 2023-09-07 RX ADMIN — KETOROLAC TROMETHAMINE 1 DROP: 0.5 SOLUTION OPHTHALMIC at 09:00

## 2023-09-07 RX ADMIN — TROPICAMIDE 1 DROP: 10 SOLUTION/ DROPS OPHTHALMIC at 09:00

## 2023-09-07 RX ADMIN — BUPIVACAINE HYDROCHLORIDE 0.38 MG: 7.5 INJECTION, SOLUTION EPIDURAL; RETROBULBAR at 09:00

## 2023-09-07 ASSESSMENT — PAIN SCALES - GENERAL
PAINLEVEL_OUTOF10: 0

## 2023-09-07 ASSESSMENT — PAIN - FUNCTIONAL ASSESSMENT: PAIN_FUNCTIONAL_ASSESSMENT: NONE - DENIES PAIN

## 2023-09-07 NOTE — PROGRESS NOTES
0445 Patient arrived to phase II via chair. Spontaneous respiraitons even and unlabored. Placed on monitor--VSS. Report received from [address] Assessment completed. Patient is alert and oriented x4. IV capped off-- no complications. Patient denies pain--will monitor. Shield in place over operative eye. Family at bedside. 1002 Snack and drink provided. Pt. Denies all other needs at this time. Call light handed to pt. Chair locked. 1013 RN at bedside. Pt states readiness for discharge. 1015 Discharge instructions and eye drop schedule reviewed with the pt. And S.O. All questions addressed. AVS and drop schedule handed to spouse. 1018 Pt. Standing at bedside. With stand by assist of RN. Pt. Denies weakness or dizziness. 1020 INT removed no Complications noted. 1022 Pt. Getting self dressed. Family remains at bedside. 1 Family left to get private vehicle. 1027 Pt. Ambulated to private vehicle in stable condition with stand by assist of RN.

## 2023-09-07 NOTE — ANESTHESIA PRE PROCEDURE
Department of Anesthesiology  Preprocedure Note       Name:  Pippa Silver   Age:  79 y.o.  :  1955                                          MRN:  [id number]         Date:  2023      Surgeon: Carole Hanks):  Nina Kay MD    Procedure: Procedure(s):  LEFT EYE CATARACT EMULSIFICATION IOL IMPLANT    Medications prior to admission:   Prior to Admission medications    Medication Sig Start Date End Date Taking?  Authorizing Provider   Ascorbic Acid (VITAMIN C) 250 MG tablet Take 1 tablet by mouth daily   Yes Historical Provider, MD   Cholecalciferol (VITAMIN D3 PO) Take by mouth daily   Yes Historical Provider, MD   LATANOPROST OP Place 1 drop into both eyes at bedtime   Yes Historical Provider, MD   DORZOLAMIDE HCL OP Apply 1 drop to eye 2 times daily   Yes Historical Provider, MD   Omega 3-6-9 Fatty Acids (OMEGA-3-6-9 PO) Take by mouth 2 times daily Triple   Yes Historical Provider, MD   vitamin E 1000 units capsule Take 1 capsule by mouth daily   Yes Historical Provider, MD   ELIQUIS 5 MG TABS tablet 2 times daily 23   Historical Provider, MD   PARoxetine (PAXIL) 20 MG tablet daily 23   Historical Provider, MD   levothyroxine (SYNTHROID) 137 MCG tablet TAKE 1 TABLET DAILY AND ONE AND  ONE-HALF TABLETS ON 23   Jagjit Guerra MD   insulin lispro, 1 Unit Dial, (HUMALOG KWIKPEN) 100 UNIT/ML SOPN INJECT SUBCUTANEOUSLY 10 UNITS A DAY PLUS SLIDING SCALE, MAXIMUM  DAILY DOSE 70 UNITS  Patient taking differently: 7 units with each meal plus sliding scale 3/30/23   Jagjit Guerra MD   insulin glargine (LANTUS SOLOSTAR) 100 UNIT/ML injection pen INJECT 20 UNITS IN THE MORNING AND 10 UNITS IN THE EVENING *Dose change*  Patient taking differently: INJECT 22 UNITS IN THE MORNING AND 10 UNITS IN THE EVENING *Dose change* 10/20/22   Jagjit Guerra MD   diclofenac (VOLTAREN) 75 MG EC tablet 2 times daily 21   Historical Provider, MD   midodrine (PROAMATINE) 5 MG tablet Take 1 tablet by mouth 3

## 2023-09-07 NOTE — H&P
I have examined the patient and reviewed the H&P / Consult and there are no changes to the patient.     Nina Kay MD 4/5/219634:92 PM

## 2023-09-07 NOTE — OP NOTE
the anterior chamber. A keratome was used to produce a clear corneal incision at the temporal limbus. A capsulorrhexis-type capsulotomy was performed. Hydrodissection was performed balanced salt solution (BSS). The lens nucleus was phacoemulsified. The lens cortical material was aspirated using the automated irrigation/aspiration unit (I/A). Additional viscoelastic was instilled into the anterior segment and capsular bag. An intraocular lens was introduced and centered within the capsular bag. The patient and microscope were rotated into position for canaloplasty/gonioscopy. A good view of the nasal angle and trabecular meshwork was obtained with a gonioscopy prism coupled with viscoelastic. Additional, provisc was injected into the angle. Iridocorneal angle was inspected and exhibited normal anatomy. The Omni canaloplasty device was loaded with Provisc and was inspected. It entered the nasal TM into Schlemm's Canal (SC) and the surgeon gently advanced the cannula 180 degrees counterclockwise in SC then gently withdrew the cannula while dilating SC and the  channels with provisc. The device was withdrawn then advanced clockwise from the nasal aspect of the angle to dilate both SC and the  channels for the remaining 180 degrees to achieve 360 degree canaloplasty. The OMNI cannula was advanced again in the inferior 180 degrees of SC and pulled out the main wound to achieve a 5-6 clock hour goniotomy  The viscoelastic was removed and replaced with BSS using the I/A. The wounds were hydrated with BSS. Dilute Vigamox was instilled into the anterior chamber. The tension of the globe was adjusted. The incisions were watertight and the lens in desired postion. The lid speculum was removed. Post-operative drops were placed on the eye. The patient tolerated the procedure well and was taken from the operating room in good condition.       Electronically signed by Steven Benoit MD on 9/7/2023 at 12:58 PM

## 2023-09-07 NOTE — PROGRESS NOTES
1gtt of Proparacaine given at start of case  1gtt of Moxifloxacin given at end of case  1gtt of Lotemax given at end of case  Vigamox 4:1 with BSS 0.1mL mixture given at end of case

## 2023-09-20 DIAGNOSIS — E10.65 TYPE 1 DIABETES MELLITUS WITH HYPERGLYCEMIA (HCC): ICD-10-CM

## 2023-09-20 RX ORDER — INSULIN GLARGINE 100 [IU]/ML
INJECTION, SOLUTION SUBCUTANEOUS
Qty: 30 ML | Refills: 3 | Status: SHIPPED | OUTPATIENT
Start: 2023-09-20

## 2023-10-23 ENCOUNTER — TELEPHONE (OUTPATIENT)
Dept: PHARMACY | Facility: CLINIC | Age: 68
End: 2023-10-23

## 2023-10-23 ENCOUNTER — OFFICE VISIT (OUTPATIENT)
Dept: INTERNAL MEDICINE CLINIC | Age: 68
End: 2023-10-23
Payer: MEDICARE

## 2023-10-23 VITALS
WEIGHT: 216.4 LBS | SYSTOLIC BLOOD PRESSURE: 148 MMHG | DIASTOLIC BLOOD PRESSURE: 86 MMHG | BODY MASS INDEX: 31.96 KG/M2 | TEMPERATURE: 97.8 F | RESPIRATION RATE: 18 BRPM | HEART RATE: 80 BPM | OXYGEN SATURATION: 98 %

## 2023-10-23 DIAGNOSIS — I10 ESSENTIAL HYPERTENSION: ICD-10-CM

## 2023-10-23 DIAGNOSIS — E10.8 DM (DIABETES MELLITUS), TYPE 1 WITH COMPLICATIONS (HCC): Primary | ICD-10-CM

## 2023-10-23 DIAGNOSIS — E89.0 POSTOPERATIVE HYPOTHYROIDISM: ICD-10-CM

## 2023-10-23 LAB — HBA1C MFR BLD: 8.3 % (ref 4.3–5.7)

## 2023-10-23 PROCEDURE — 1123F ACP DISCUSS/DSCN MKR DOCD: CPT | Performed by: INTERNAL MEDICINE

## 2023-10-23 PROCEDURE — 83036 HEMOGLOBIN GLYCOSYLATED A1C: CPT | Performed by: INTERNAL MEDICINE

## 2023-10-23 PROCEDURE — 3077F SYST BP >= 140 MM HG: CPT | Performed by: INTERNAL MEDICINE

## 2023-10-23 PROCEDURE — 3017F COLORECTAL CA SCREEN DOC REV: CPT | Performed by: INTERNAL MEDICINE

## 2023-10-23 PROCEDURE — 3079F DIAST BP 80-89 MM HG: CPT | Performed by: INTERNAL MEDICINE

## 2023-10-23 PROCEDURE — 99213 OFFICE O/P EST LOW 20 MIN: CPT | Performed by: INTERNAL MEDICINE

## 2023-10-23 PROCEDURE — 3052F HG A1C>EQUAL 8.0%<EQUAL 9.0%: CPT | Performed by: INTERNAL MEDICINE

## 2023-10-23 PROCEDURE — G8417 CALC BMI ABV UP PARAM F/U: HCPCS | Performed by: INTERNAL MEDICINE

## 2023-10-23 PROCEDURE — 1036F TOBACCO NON-USER: CPT | Performed by: INTERNAL MEDICINE

## 2023-10-23 PROCEDURE — G8400 PT W/DXA NO RESULTS DOC: HCPCS | Performed by: INTERNAL MEDICINE

## 2023-10-23 PROCEDURE — 1090F PRES/ABSN URINE INCON ASSESS: CPT | Performed by: INTERNAL MEDICINE

## 2023-10-23 PROCEDURE — 2022F DILAT RTA XM EVC RTNOPTHY: CPT | Performed by: INTERNAL MEDICINE

## 2023-10-23 PROCEDURE — G8427 DOCREV CUR MEDS BY ELIG CLIN: HCPCS | Performed by: INTERNAL MEDICINE

## 2023-10-23 PROCEDURE — G8484 FLU IMMUNIZE NO ADMIN: HCPCS | Performed by: INTERNAL MEDICINE

## 2023-10-23 RX ORDER — NETARSUDIL 0.2 MG/ML
SOLUTION/ DROPS OPHTHALMIC; TOPICAL
COMMUNITY
Start: 2023-10-20 | End: 2023-10-23

## 2023-10-23 RX ORDER — DORZOLAMIDE HCL 20 MG/ML
SOLUTION/ DROPS OPHTHALMIC
COMMUNITY
Start: 2023-10-08

## 2023-10-23 RX ORDER — SYRINGE-NEEDLE,INSULIN,0.5 ML 30 GX5/16"
SYRINGE, EMPTY DISPOSABLE MISCELLANEOUS
COMMUNITY
Start: 2023-07-21 | End: 2023-10-23 | Stop reason: SDUPTHER

## 2023-10-23 RX ORDER — BIMATOPROST 0.3 MG/ML
SOLUTION/ DROPS OPHTHALMIC
COMMUNITY
Start: 2023-09-20

## 2023-10-23 RX ORDER — BROMFENAC SODIUM 0.7 MG/ML
SOLUTION/ DROPS OPHTHALMIC
COMMUNITY
Start: 2023-08-29

## 2023-10-23 RX ORDER — PROPAFENONE HYDROCHLORIDE 150 MG/1
TABLET, COATED ORAL
COMMUNITY
Start: 2023-09-22

## 2023-10-23 RX ORDER — SYRINGE-NEEDLE,INSULIN,0.5 ML 30 GX5/16"
SYRINGE, EMPTY DISPOSABLE MISCELLANEOUS
Qty: 100 EACH | Refills: 5 | Status: SHIPPED | OUTPATIENT
Start: 2023-10-23

## 2023-10-23 NOTE — PROGRESS NOTES
[facility] Internal Medicine   [facility] [facility], [address] [facility],[address]  Dept: [phone]  Dept Fax: [phone]    YOB: 1955    Type I diabetes mellitus/ dyslipidemia    79 y.o. female   here for follow-up of above issues. She does see Paxton Cage CNP for medical issues here in town were following up mainly  diabetes and the dyslipidemia. She had  thyroidectomy and now has hypothyroidism for which she is taking supplements. Diagnosed with DM back on 2000, strong family hx of DM. She is an obese WF not in distress, she does have retinopathy , followed by ophthalmology Q 6 months. No CAD, seen dr. Gala Mg. Her  A1c 8.3% today ,  vs  5/23  8.1 . She does have chronic hip pain bilaterally. She is under a lot of stress due to her mothers illness, in and out of hospital for the 3 months. She does check her sugars frequently due to [place]. Pt admits at times she Forgets her insulin occasionally, I RECOMMEND SHE PUTS AN ALERTS ON THE PHONE OR TIMERS ETC.  Claims she is under a lots of stress, due to her family issues with her parents. No LOC and did check her  sugars range. We downloaded her [place] over the last 30 days , average is 215 and only 40 % in the target range of 70 - 180 , high to very high 31% of the time. , 28% high in 181-250. ,  The high readings ` about 32% of the time GMI is 8.5 % . , glucose variability is 39%.            Current Outpatient Medications   Medication Sig Dispense Refill    propafenone (RYTHMOL) 150 MG tablet       PROLENSA 0.07 % SOLN instill 1 drop into left eye once daily START 3 DAYS PRIOR TO SURGERY      dorzolamide (TRUSOPT) 2 % ophthalmic solution       RA PEN NEEDLES 31G X 8 MM MISC use 1 PEN NEEDLE to inject INSULIN subcutaneously up to six times a day 100 each 5    insulin glargine (LANTUS SOLOSTAR) 100 UNIT/ML injection pen INJECT 22 UNITS IN THE MORNING AND 10 UNITS IN THE EVENING *Dose change* (Patient taking differently:

## 2023-10-26 NOTE — TELEPHONE ENCOUNTER
POPULATION HEALTH CLINICAL PHARMACY REVIEW: [facility]    No CMS approved exclusion code added at last visit.      Todd JoD, BCACP, [facility], toll free: [phone], option 1    =======================================================    For Pharmacy Admin Tracking Only  Program: Nissa in place:  No  Recommendation Provided To: Provider: 1 via Note to Provider  Intervention Accepted By: Provider: 0  Gap Closed?: No   Time Spent (min): 45

## 2023-11-29 RX ORDER — INSULIN GLARGINE 100 [IU]/ML
INJECTION, SOLUTION SUBCUTANEOUS 2 TIMES DAILY
COMMUNITY

## 2023-11-29 NOTE — PROGRESS NOTES
NPO after midnight  Bring eye bag from office  Bring insurance info and drivers license  Wear comfortable clean clothing, button down top  Do not bring jewelry  Shower night before and morning of surgery with a liquid antibacterial soap  Bring list of medications with dosage and how often taken  Follow all instructions given by your physician   needed at discharge  Call [phone] for any questions

## 2023-11-29 NOTE — PROGRESS NOTES
In preparation for their surgical procedure above patient was screened for Obstructive Sleep Apnea (HI) using the STOP-Bang Questionnaire by the Pre-Admission Testing department. This is a pre-surgical screening tool for patient safety and serves as a recommendation, this WILL NOT cause cancellation of surgery. STOP-Bang Questionnaire  * Do you currently see a pulmonologist?  No     If yes STOP, do not complete. Patient follows with DrJay     1.  Do you snore loudly (able to be heard in the next room)? No    2. Do you often feel tired or sleepy during the daytime? No       3. Has anyone ever told you that you stop breathing during your sleep? No    4. Do you have or are you being treated for high blood pressure? No      5. BMI more than 35? BMI (Calculated): 32        No    6. Age over 48 years? 79 y.o. Yes    7. Neck Circumference greater than 17 inches for male or 16 inches for female? Measured           (visits only)            Not Applicable    8. Gender Male? No      TOTAL SCORE: 1    HI - Low Risk : Yes to 0 - 2 questions  HI - Intermediate Risk : Yes to 3 - 4 questions  HI - High Risk : Yes to 5 - 8 questions    Adapted from:   STOP Questionnaire: A Tool to Screen Patients for Obstructive Sleep Apnea   CHHAYA Arriola.C.P.C., Lucinda Greene M.B.B.S., Rosa Maria Leo M.D., Megan Sandoval. Cely Garber, Ph.D., ZO Cormier.B.B.S., Amelie Pappas, M.Sc., Fox Platt M.D., [facility]. NEELA Figueroa.P.C.    Anesthesiology 2008; 288:039-35 Copyright 2008, the [place] [employer], [address] [facility].   ----------------------------------------------------------------------------------------------------------------

## 2023-12-05 NOTE — DISCHARGE INSTRUCTIONS
Cataract Post-Operative Care Instructions     How to Instill you Eye Drops:    Wash your hands before instilling drops   Shake eye drop bottle putting one drop in the surgical eye   The dropper tip should not touch the eyelashes or the eye. Your head should be tilted back, look up and lower the eyelid pulled down from the cheekbone to form a pocket for the eye drop. Daily Eye Drop Schedule:    Remove the eye patch as directed by the recovery room nurse. Apply 1 drop of Ofloxacin three times today, then four times a day starting tomorrow for one week. Lay a warm, clean and moist washcloth for 5 minutes on operative eye. Apply 1 drop of Lotemax two times today, and then starting tomorrow three times a day for one week, two times a day for one week and then once a day for one week. Lay a warm, clean and moist washcloth for 5 minutes on operative eye. Apply 1 drop of Prolensa one time a day starting tomorrow for two weeks. Lay a warm, clean and moist washcloth for 5 minutes on operative eye. Care at Home:     Wear a shield at bedtime for one week following your eye surgery. NEVER RUB YOUR OPERATIVE EYE! Use of the operative eye is NOT harmful. Your vision may be blurry with your present glasses. Take your glasses to your follow-up appointment the day after surgery. You will receive your new glasses prescription approximately 4-5 weeks following surgery. You may do everything to care for yourself. You may sleep on your back or either side after surgery, but NOT FACE DOWN ON YOUR STOMACH. NO LIFTING OVER 10 POUNDS. No outdoor work until your doctor tells you that it is OK. Light work, including stooping over is not harmful. If you have glaucoma, continue your normal use of the glaucoma drops. Do not submerge head under water (Hot tub/swimming pool)    Please call office if any problem arise at [phone] Extension [phone].     I have had the opportunity to ask questions and have

## 2023-12-07 ENCOUNTER — HOSPITAL ENCOUNTER (OUTPATIENT)
Age: 68
Setting detail: OUTPATIENT SURGERY
Discharge: HOME | End: 2023-12-07
Attending: OPHTHALMOLOGY | Admitting: OPHTHALMOLOGY
Payer: MEDICARE

## 2023-12-07 ENCOUNTER — ANESTHESIA (OUTPATIENT)
Dept: OPERATING ROOM | Age: 68
End: 2023-12-07
Payer: MEDICARE

## 2023-12-07 ENCOUNTER — ANESTHESIA EVENT (OUTPATIENT)
Dept: OPERATING ROOM | Age: 68
End: 2023-12-07
Payer: MEDICARE

## 2023-12-07 VITALS
SYSTOLIC BLOOD PRESSURE: 142 MMHG | WEIGHT: 217 LBS | DIASTOLIC BLOOD PRESSURE: 80 MMHG | RESPIRATION RATE: 16 BRPM | TEMPERATURE: 96.9 F | OXYGEN SATURATION: 96 % | HEART RATE: 60 BPM | BODY MASS INDEX: 32.14 KG/M2 | HEIGHT: 69 IN

## 2023-12-07 LAB
GLUCOSE BLD STRIP.AUTO-MCNC: 122 MG/DL (ref 70–108)
GLUCOSE BLD STRIP.AUTO-MCNC: 132 MG/DL (ref 70–108)

## 2023-12-07 PROCEDURE — 6360000002 HC RX W HCPCS: Performed by: OPHTHALMOLOGY

## 2023-12-07 PROCEDURE — 6370000000 HC RX 637 (ALT 250 FOR IP): Performed by: OPHTHALMOLOGY

## 2023-12-07 PROCEDURE — 7100000010 HC PHASE II RECOVERY - FIRST 15 MIN: Performed by: OPHTHALMOLOGY

## 2023-12-07 PROCEDURE — 3600000003 HC SURGERY LEVEL 3 BASE: Performed by: OPHTHALMOLOGY

## 2023-12-07 PROCEDURE — 3700000000 HC ANESTHESIA ATTENDED CARE: Performed by: OPHTHALMOLOGY

## 2023-12-07 PROCEDURE — 2500000003 HC RX 250 WO HCPCS: Performed by: OPHTHALMOLOGY

## 2023-12-07 PROCEDURE — 2709999900 HC NON-CHARGEABLE SUPPLY: Performed by: OPHTHALMOLOGY

## 2023-12-07 PROCEDURE — V2632 POST CHMBR INTRAOCULAR LENS: HCPCS | Performed by: OPHTHALMOLOGY

## 2023-12-07 PROCEDURE — 2580000003 HC RX 258

## 2023-12-07 PROCEDURE — 6360000002 HC RX W HCPCS

## 2023-12-07 PROCEDURE — 3700000001 HC ADD 15 MINUTES (ANESTHESIA): Performed by: OPHTHALMOLOGY

## 2023-12-07 PROCEDURE — 7100000011 HC PHASE II RECOVERY - ADDTL 15 MIN: Performed by: OPHTHALMOLOGY

## 2023-12-07 PROCEDURE — 3600000013 HC SURGERY LEVEL 3 ADDTL 15MIN: Performed by: OPHTHALMOLOGY

## 2023-12-07 PROCEDURE — 2720000010 HC SURG SUPPLY STERILE: Performed by: OPHTHALMOLOGY

## 2023-12-07 PROCEDURE — 82948 REAGENT STRIP/BLOOD GLUCOSE: CPT

## 2023-12-07 DEVICE — ACRYSOF(R) IQ ASPHERIC NATURAL IOL, SINGLE-PIECE ACRYLIC FOLDABLE PCL, UV WITH BLUE LIGHTFILTER, 13.0MM LENGTH, 6.0MM ANTERIORASYMMETRIC BICONVEX OPTIC, PLANAR HAPTICS.
Type: IMPLANTABLE DEVICE | Site: EYE | Status: FUNCTIONAL
Brand: ACRYSOF®

## 2023-12-07 RX ORDER — LIDOCAINE HYDROCHLORIDE 10 MG/ML
INJECTION, SOLUTION EPIDURAL; INFILTRATION; INTRACAUDAL; PERINEURAL PRN
Status: DISCONTINUED | OUTPATIENT
Start: 2023-12-07 | End: 2023-12-07 | Stop reason: ALTCHOICE

## 2023-12-07 RX ORDER — MIDAZOLAM HYDROCHLORIDE 1 MG/ML
INJECTION INTRAMUSCULAR; INTRAVENOUS PRN
Status: DISCONTINUED | OUTPATIENT
Start: 2023-12-07 | End: 2023-12-07 | Stop reason: SDUPTHER

## 2023-12-07 RX ORDER — SODIUM CHLORIDE 9 MG/ML
INJECTION, SOLUTION INTRAVENOUS CONTINUOUS PRN
Status: DISCONTINUED | OUTPATIENT
Start: 2023-12-07 | End: 2023-12-07 | Stop reason: SDUPTHER

## 2023-12-07 RX ORDER — SODIUM CHLORIDE 9 MG/ML
INJECTION, SOLUTION INTRAVENOUS CONTINUOUS
Status: DISCONTINUED | OUTPATIENT
Start: 2023-12-07 | End: 2023-12-07 | Stop reason: HOSPADM

## 2023-12-07 RX ORDER — BUPIVACAINE HYDROCHLORIDE 7.5 MG/ML
1 INJECTION, SOLUTION EPIDURAL; RETROBULBAR EVERY 5 MIN PRN
Status: COMPLETED | OUTPATIENT
Start: 2023-12-07 | End: 2023-12-07

## 2023-12-07 RX ADMIN — Medication 3 ML: at 06:53

## 2023-12-07 RX ADMIN — MIDAZOLAM 1 MG: 1 INJECTION INTRAMUSCULAR; INTRAVENOUS at 07:32

## 2023-12-07 RX ADMIN — SODIUM CHLORIDE: 9 INJECTION, SOLUTION INTRAVENOUS at 07:29

## 2023-12-07 RX ADMIN — BUPIVACAINE HYDROCHLORIDE 0.38 MG: 7.5 INJECTION, SOLUTION EPIDURAL; RETROBULBAR at 06:48

## 2023-12-07 RX ADMIN — Medication 3 ML: at 06:58

## 2023-12-07 RX ADMIN — BUPIVACAINE HYDROCHLORIDE 0.38 MG: 7.5 INJECTION, SOLUTION EPIDURAL; RETROBULBAR at 06:58

## 2023-12-07 RX ADMIN — Medication 3 ML: at 06:48

## 2023-12-07 RX ADMIN — BUPIVACAINE HYDROCHLORIDE 0.38 MG: 7.5 INJECTION, SOLUTION EPIDURAL; RETROBULBAR at 06:53

## 2023-12-07 ASSESSMENT — PAIN - FUNCTIONAL ASSESSMENT: PAIN_FUNCTIONAL_ASSESSMENT: 0-10

## 2023-12-07 ASSESSMENT — PAIN SCALES - GENERAL: PAINLEVEL_OUTOF10: 0

## 2023-12-07 NOTE — PROGRESS NOTES
1 DROP PROPARACAINE TO RIGHT EYE PRIOR TO PREP    CDE 2.58    FSBS 132 PER ANESTHESIA REQUEST AT 0758     0.1ML VIGAMOX TO RIGHT EYE AT END OF PROCEDURE    1 DROP MOXIFLOXACIN AND 1 DROP LOTEMAX TO RIGHT EYE AT END OF PROCEDURE    EYE SHIELD SECURED OVER RIGHT EYE WITH PAPER TAPE AT END OF PROCEDURE

## 2023-12-07 NOTE — ANESTHESIA POSTPROCEDURE EVALUATION
Department of Anesthesiology  Postprocedure Note    Patient: Sherry Boss  MRN: [id number]  YOB: 1955  Date of evaluation: 12/7/2023      Procedure Summary       Date: 12/07/23 Room / Location: [address]    Anesthesia Start: 7842 Anesthesia Stop: 0802    Procedure: RIGHT EYE CATARACT EMULSIFICATION IOL IMPLANT, RIGHT EYE GONIOTOMY/CANALOPLASTY (Right: Eye) Diagnosis:       Other age-related cataract of right eye      Primary open-angle glaucoma, right eye, moderate stage      (Other age-related cataract of right eye [H25.89])      (Primary open-angle glaucoma, right eye, moderate stage [B06.7788])    Surgeons: Ulysses Fuentes MD Responsible Provider: Nicole Schwab, DO    Anesthesia Type: MAC ASA Status: 3            Anesthesia Type: No value filed.     Levy Phase I: Levy Score: 10    Levy Phase II: Levy Score: 10      Anesthesia Post Evaluation    Patient location during evaluation: bedside  Patient participation: complete - patient participated  Level of consciousness: awake and alert  Pain score: 0  Airway patency: patent  Nausea & Vomiting: no nausea and no vomiting  Complications: no  Cardiovascular status: hemodynamically stable and blood pressure returned to baseline  Respiratory status: spontaneous ventilation, room air and acceptable  Hydration status: stable  Pain management: adequate and satisfactory to patient

## 2023-12-07 NOTE — H&P
I have examined the patient and reviewed the H&P / Consult and there are no changes to the patient.     Raven Stack MD 12/7/20237:08 AM

## 2023-12-07 NOTE — ANESTHESIA PRE PROCEDURE
PLUS SLIDING SCALE, MAXIMUM  DAILY DOSE 70 UNITS  Patient taking differently: 7 units with each meal plus sliding scale 3/30/23   Jagjit Guerra MD   diclofenac (VOLTAREN) 75 MG EC tablet 2 times daily 8/16/21   Leonid Reilly MD   midodrine (PROAMATINE) 5 MG tablet Take 1 tablet by mouth 3 times daily    Leonid Reilly MD   Nutritional Supplements (ESTROVEN PO) Take by mouth every other day    Leonid Reilly MD   ezetimibe (ZETIA) 10 MG tablet Take 1 tablet by mouth daily    Leonid Reilly MD   brimonidine-timolol (COMBIGAN) 0.2-0.5 % ophthalmic solution Place 1 drop into both eyes 3 times daily    Leonid Reilly MD   calcium carbonate (TUMS EX) 750 MG chewable tablet Take 2 tablets by mouth daily    Leonid Reilly MD   therapeutic multivitamin-minerals (THERAGRAN-M) tablet Take 1 tablet by mouth daily    Leonid Reilly MD       Current medications:    Current Facility-Administered Medications   Medication Dose Route Frequency Provider Last Rate Last Admin    0.9 % sodium chloride infusion   IntraVENous Continuous Alison Morin MD        balanced salts (BSS) 500 mL    PRN Alison Morin MD   225 mL at 12/07/23 0738    lidocaine PF 1 % injection    PRN Alison Morin MD   0.1 mL at 12/07/23 9239    Phenylephrine-Ketorolac 1-0.3 % SOLN    PRN Alison Morin MD   0.2 mL at 12/07/23 0738    sod hyaluronate-sod chondroitin-sod hyaluronate ([address] [place] [address]) intra-ocular kit    PRN Alison Morin MD   1.05 mL at 12/07/23 0746     Facility-Administered Medications Ordered in Other Encounters   Medication Dose Route Frequency Provider Last Rate Last Admin    0.9 % sodium chloride infusion   IntraVENous Continuous PRN Tohsa Canes, APRN - CRNA   New Bag at 12/07/23 0729    midazolam (VERSED) injection   IntraVENous PRN Tosha Canes, APRN - CRNA   1 mg at 12/07/23 0732       Allergies:     Allergies   Allergen Reactions    Estrogens      hemorrhage    Milk-Related

## 2023-12-07 NOTE — PROGRESS NOTES
9348 Pt to phase 2 recovery per chair. Pt awake and alert. Denies pain. Eye patch on right eye. No drainage noted from eye. Family at bedside. 9204 Pt taking offered snack. 0830 IV discontinued. Discharge instructions reviewed with pt and . Both voice understanding. 1230 Pt dressing with  in room . 2737 Pt discharged ambulatory with staff to car with . Pt alert and stable.

## 2023-12-07 NOTE — OP NOTE
[facility]  RECORD OF OPERATION                       2023    Patient: Heron Chacon  : 1955  Acct#: [de-identified]    PRE-OPERATIVE DIAGNOSIS:  Cataract, OD. POAG OD    POST-OPERATIVE DIAGNOSIS:  same    OPERATION PERFORMED:  Phacoemulsification cataract extraction with posterior chamber intraocular lens, OD. Goniotomy with canaloplasty, OD.     MODIFIERS: Complex with malyugin ring    SURGEON:  Mancil Kussmaul, MD    ANESTHESIA:  Topical/MAC    COMPLICATIONS:  None    LENS INFORMATION: SN60WF +19.5 ([id number])    CDE: 2.58    INDICATION AND CONSENT:  The patient was found to have a visually significant cataract affecting their activities of daily living which is not adequately correctable by a change in spectacles. The risks and options of cataract surgery including observation were discussed. Consent was obtained and the patient requests to proceed. In addition, the patient was found to have moderate POAG. The risks, benefits, and alternative options of goniotomy including medications, canaloplasty, and observation were discussed. The patient expressed a desire to proceed with cataract surgery, as well as, goniotomy and consent was obtained. OPERATIVE TECHNIQUE: In the preoperative area, the patient was prepared for surgery including dilation of the operative eye. The patient was then taken to the operating room, the operative eye was prepped and draped in the usual sterile ophthalmic fashion. A final timeout was performed to confirm the correct patient, site, side, lens, and procedure. A lid speculum was inserted. A paracentesis incision was made at the limbus in a position comfortable for the non-dominate hand. 0.2-0.4cc of 1% lidocaine was instilled into the anterior chamber followed by 0.2-0.4cc of Omidria. Viscoelastic was instilled into the anterior chamber. A keratome was used to produce a clear corneal incision at the temporal limbus.

## 2023-12-29 ENCOUNTER — HOSPITAL ENCOUNTER (OUTPATIENT)
Age: 68
Discharge: HOME | End: 2023-12-29
Payer: MEDICARE

## 2023-12-29 DIAGNOSIS — E10.8 DM (DIABETES MELLITUS), TYPE 1 WITH COMPLICATIONS (HCC): ICD-10-CM

## 2023-12-29 DIAGNOSIS — I10 ESSENTIAL HYPERTENSION: ICD-10-CM

## 2023-12-29 DIAGNOSIS — E89.0 POSTOPERATIVE HYPOTHYROIDISM: ICD-10-CM

## 2023-12-29 LAB
ALBUMIN SERPL BCG-MCNC: 4.1 G/DL (ref 3.5–5.1)
ALP SERPL-CCNC: 115 U/L (ref 38–126)
ALT SERPL W/O P-5'-P-CCNC: 26 U/L (ref 11–66)
ANION GAP SERPL CALC-SCNC: 11 MEQ/L (ref 8–16)
AST SERPL-CCNC: 26 U/L (ref 5–40)
BILIRUB SERPL-MCNC: 0.3 MG/DL (ref 0.3–1.2)
BUN SERPL-MCNC: 12 MG/DL (ref 7–22)
CALCIUM SERPL-MCNC: 9.8 MG/DL (ref 8.5–10.5)
CHLORIDE SERPL-SCNC: 104 MEQ/L (ref 98–111)
CHOLEST SERPL-MCNC: 179 MG/DL (ref 100–199)
CO2 SERPL-SCNC: 26 MEQ/L (ref 23–33)
CREAT SERPL-MCNC: 1.1 MG/DL (ref 0.4–1.2)
DEPRECATED MEAN GLUCOSE BLD GHB EST-ACNC: 219 MG/DL (ref 70–126)
GFR SERPL CREATININE-BSD FRML MDRD: 55 ML/MIN/1.73M2
GLUCOSE SERPL-MCNC: 66 MG/DL (ref 70–108)
HBA1C MFR BLD HPLC: 9.3 % (ref 4.4–6.4)
HDLC SERPL-MCNC: 60 MG/DL
LDLC SERPL CALC-MCNC: 92 MG/DL
POTASSIUM SERPL-SCNC: 4.9 MEQ/L (ref 3.5–5.2)
PROT SERPL-MCNC: 7.7 G/DL (ref 6.1–8)
SODIUM SERPL-SCNC: 141 MEQ/L (ref 135–145)
T4 FREE SERPL-MCNC: 1.24 NG/DL (ref 0.93–1.76)
TRIGL SERPL-MCNC: 134 MG/DL (ref 0–199)
TSH SERPL DL<=0.005 MIU/L-ACNC: 10.08 UIU/ML (ref 0.4–4.2)

## 2023-12-29 PROCEDURE — 80053 COMPREHEN METABOLIC PANEL: CPT

## 2023-12-29 PROCEDURE — 84443 ASSAY THYROID STIM HORMONE: CPT

## 2023-12-29 PROCEDURE — 84439 ASSAY OF FREE THYROXINE: CPT

## 2023-12-29 PROCEDURE — 80061 LIPID PANEL: CPT

## 2023-12-29 PROCEDURE — 36415 COLL VENOUS BLD VENIPUNCTURE: CPT

## 2023-12-29 PROCEDURE — 83036 HEMOGLOBIN GLYCOSYLATED A1C: CPT

## 2024-01-09 ENCOUNTER — TELEPHONE (OUTPATIENT)
Dept: INTERNAL MEDICINE CLINIC | Age: 69
End: 2024-01-09

## 2024-01-09 DIAGNOSIS — E89.0 POSTOPERATIVE HYPOTHYROIDISM: Primary | ICD-10-CM

## 2024-01-09 NOTE — TELEPHONE ENCOUNTER
----- Message from Jagjit Guerra MD sent at 1/9/2024 12:28 PM EST -----    She is currently taking one tab thyroid med Monday to Friday , and change 1. 5 tabs on sat   And Sunday and repeat TSH and T4 in 10 weeks. Also remind her to take the thyroid  Med on empty stomach in am       Electronically signed by Jagjit Guerra MD on 1/9/2024 at 12:30 PM          ----- Message -----  From: Yung Charles Incoming Lab Results From Soft  Sent: 12/29/2023  10:27 AM EST  To: Jagjit Guerra MD

## 2024-02-06 ENCOUNTER — ENROLLMENT (OUTPATIENT)
Dept: PHARMACY | Facility: CLINIC | Age: 69
End: 2024-02-06

## 2024-02-06 DIAGNOSIS — E89.0 STATUS POST TOTAL THYROIDECTOMY: ICD-10-CM

## 2024-02-06 RX ORDER — LEVOTHYROXINE SODIUM 137 UG/1
TABLET ORAL
Qty: 98 TABLET | Refills: 3 | OUTPATIENT
Start: 2024-02-06

## 2024-02-17 DIAGNOSIS — E89.0 STATUS POST TOTAL THYROIDECTOMY: ICD-10-CM

## 2024-02-19 NOTE — TELEPHONE ENCOUNTER
Last ordered 5/30, disp 96, refill 3    Last visit 10/23:     Hypothyroidism - s/p thyroidectomy , due to nodule, no cancer.  On supplements, with normal TSH so no change in thryoid meds.   Re check TSH and T4 pre next visit, last ones from 5/23 noted    **New labwork expected 3/19              Component  Ref Range & Units 12/29/23 0906 5/18/23 1654 6/15/22 0912 4/21/21 1217 3/10/21 1026 1/28/21 0919 7/6/20 0904   TSH  0.400 - 4.200 uIU/mL 10.080 High  5.590 High  CM 1.050 CM 1.070 CM 0.422 CM 0.724 CM 1.860 R,                  Component  Ref Range & Units 12/29/23 0906 5/18/23 1654 6/15/22 0912 4/21/21 1217 1/23/20 1129 10/14/19 1156 5/25/19 0913   T4 Free  0.93 - 1.76 ng/dL 1.24 1.11 CM 1.76 CM 1.47 CM 1.32 CM 1.47 CM 1.41 CM

## 2024-02-20 RX ORDER — LEVOTHYROXINE SODIUM 137 UG/1
TABLET ORAL
Qty: 98 TABLET | Refills: 3 | Status: SHIPPED | OUTPATIENT
Start: 2024-02-20 | End: 2024-02-22 | Stop reason: SDUPTHER

## 2024-02-22 ENCOUNTER — OFFICE VISIT (OUTPATIENT)
Dept: INTERNAL MEDICINE CLINIC | Age: 69
End: 2024-02-22
Payer: MEDICARE

## 2024-02-22 VITALS
OXYGEN SATURATION: 98 % | SYSTOLIC BLOOD PRESSURE: 146 MMHG | WEIGHT: 215.8 LBS | BODY MASS INDEX: 31.87 KG/M2 | DIASTOLIC BLOOD PRESSURE: 88 MMHG | TEMPERATURE: 97.4 F | RESPIRATION RATE: 18 BRPM | HEART RATE: 86 BPM

## 2024-02-22 DIAGNOSIS — E10.65 TYPE 1 DIABETES MELLITUS WITH HYPERGLYCEMIA (HCC): Primary | ICD-10-CM

## 2024-02-22 DIAGNOSIS — E89.0 STATUS POST TOTAL THYROIDECTOMY: ICD-10-CM

## 2024-02-22 DIAGNOSIS — E89.0 POSTOPERATIVE HYPOTHYROIDISM: ICD-10-CM

## 2024-02-22 PROCEDURE — 1123F ACP DISCUSS/DSCN MKR DOCD: CPT | Performed by: INTERNAL MEDICINE

## 2024-02-22 PROCEDURE — 99213 OFFICE O/P EST LOW 20 MIN: CPT | Performed by: INTERNAL MEDICINE

## 2024-02-22 PROCEDURE — G8417 CALC BMI ABV UP PARAM F/U: HCPCS | Performed by: INTERNAL MEDICINE

## 2024-02-22 PROCEDURE — G8400 PT W/DXA NO RESULTS DOC: HCPCS | Performed by: INTERNAL MEDICINE

## 2024-02-22 PROCEDURE — 1036F TOBACCO NON-USER: CPT | Performed by: INTERNAL MEDICINE

## 2024-02-22 PROCEDURE — G8427 DOCREV CUR MEDS BY ELIG CLIN: HCPCS | Performed by: INTERNAL MEDICINE

## 2024-02-22 PROCEDURE — G8484 FLU IMMUNIZE NO ADMIN: HCPCS | Performed by: INTERNAL MEDICINE

## 2024-02-22 PROCEDURE — 3046F HEMOGLOBIN A1C LEVEL >9.0%: CPT | Performed by: INTERNAL MEDICINE

## 2024-02-22 PROCEDURE — 1090F PRES/ABSN URINE INCON ASSESS: CPT | Performed by: INTERNAL MEDICINE

## 2024-02-22 PROCEDURE — 3077F SYST BP >= 140 MM HG: CPT | Performed by: INTERNAL MEDICINE

## 2024-02-22 PROCEDURE — 2022F DILAT RTA XM EVC RTNOPTHY: CPT | Performed by: INTERNAL MEDICINE

## 2024-02-22 PROCEDURE — 3017F COLORECTAL CA SCREEN DOC REV: CPT | Performed by: INTERNAL MEDICINE

## 2024-02-22 PROCEDURE — 3079F DIAST BP 80-89 MM HG: CPT | Performed by: INTERNAL MEDICINE

## 2024-02-22 RX ORDER — LEVOTHYROXINE SODIUM 137 UG/1
TABLET ORAL
Qty: 100 TABLET | Refills: 3 | Status: SHIPPED | OUTPATIENT
Start: 2024-02-22 | End: 2024-02-22 | Stop reason: SDUPTHER

## 2024-02-22 RX ORDER — LEVOTHYROXINE SODIUM 137 UG/1
TABLET ORAL
Qty: 100 TABLET | Refills: 3 | Status: SHIPPED | OUTPATIENT
Start: 2024-02-22

## 2024-02-22 ASSESSMENT — PATIENT HEALTH QUESTIONNAIRE - PHQ9
SUM OF ALL RESPONSES TO PHQ9 QUESTIONS 1 & 2: 0
SUM OF ALL RESPONSES TO PHQ QUESTIONS 1-9: 0
1. LITTLE INTEREST OR PLEASURE IN DOING THINGS: 0
SUM OF ALL RESPONSES TO PHQ QUESTIONS 1-9: 0
2. FEELING DOWN, DEPRESSED OR HOPELESS: 0
SUM OF ALL RESPONSES TO PHQ QUESTIONS 1-9: 0
SUM OF ALL RESPONSES TO PHQ QUESTIONS 1-9: 0

## 2024-02-22 NOTE — PROGRESS NOTES
Ibuprofen-diphenhydrAMINE Cit (ADVIL PM PO) Take by mouth at bedtime      propafenone (RYTHMOL) 150 MG tablet in the morning and at bedtime      bimatoprost (LUMIGAN) 0.03 % ophthalmic drops Place 1 drop into both eyes daily      dorzolamide (TRUSOPT) 2 % ophthalmic solution Place 1 drop into both eyes 2 times daily      ELIQUIS 5 MG TABS tablet 2 times daily      PARoxetine (PAXIL) 20 MG tablet daily      Omega 3-6-9 Fatty Acids (OMEGA-3-6-9 PO) Take by mouth 2 times daily Triple      vitamin E 1000 units capsule Take 1 capsule by mouth daily      insulin lispro, 1 Unit Dial, (HUMALOG KWIKPEN) 100 UNIT/ML SOPN INJECT SUBCUTANEOUSLY 10 UNITS A DAY PLUS SLIDING SCALE, MAXIMUM  DAILY DOSE 70 UNITS (Patient taking differently: 7 units with each meal plus sliding scale) 75 mL 3    diclofenac (VOLTAREN) 75 MG EC tablet 2 times daily      midodrine (PROAMATINE) 5 MG tablet Take 1 tablet by mouth 3 times daily      ezetimibe (ZETIA) 10 MG tablet Take 1 tablet by mouth daily      brimonidine-timolol (COMBIGAN) 0.2-0.5 % ophthalmic solution Place 1 drop into both eyes 3 times daily      calcium carbonate (TUMS EX) 750 MG chewable tablet Take 2 tablets by mouth daily      therapeutic multivitamin-minerals (THERAGRAN-M) tablet Take 1 tablet by mouth daily      [DISCONTINUED] levothyroxine (SYNTHROID) 137 MCG tablet TAKE 1 TABLET BY MOUTH DAILY AND 1 AND 1/2 TABLETS BY MOUTH ON  SUNDAY 98 tablet 3    [DISCONTINUED] DORZOLAMIDE HCL OP Place 1 drop into the left eye 2 times daily      [DISCONTINUED] Nutritional Supplements (ESTROVEN PO) Take by mouth every other day       No facility-administered encounter medications on file as of 2/22/2024.        Lab Results   Component Value Date/Time     12/29/2023 09:06 AM    K 4.9 12/29/2023 09:06 AM     12/29/2023 09:06 AM    CO2 26 12/29/2023 09:06 AM    BUN 12 12/29/2023 09:06 AM    CREATININE 1.1 12/29/2023 09:06 AM    GLUCOSE 66 12/29/2023 09:06 AM    GLUCOSE 230

## 2024-02-26 ENCOUNTER — HOSPITAL ENCOUNTER (OUTPATIENT)
Age: 69
Discharge: HOME | End: 2024-02-26
Payer: MEDICARE

## 2024-02-26 ENCOUNTER — HOSPITAL ENCOUNTER (OUTPATIENT)
Dept: GENERAL RADIOLOGY | Age: 69
Discharge: HOME | End: 2024-02-26
Payer: MEDICARE

## 2024-02-26 DIAGNOSIS — R52 PAIN: ICD-10-CM

## 2024-02-26 PROCEDURE — 72072 X-RAY EXAM THORAC SPINE 3VWS: CPT

## 2024-02-26 PROCEDURE — 72040 X-RAY EXAM NECK SPINE 2-3 VW: CPT

## 2024-03-18 RX ORDER — GLUCAGON 3 MG/1
POWDER NASAL
Qty: 2 EACH | Refills: 5 | Status: SHIPPED | OUTPATIENT
Start: 2024-03-18

## 2024-03-18 NOTE — TELEPHONE ENCOUNTER
Patient used only dose of Baqsimi, requesting a new Rx to [facility]    Last ordered 11/29/21      Last visit 2/22  Next visit 6/27

## 2024-03-19 ENCOUNTER — NURSE ONLY (OUTPATIENT)
Dept: LAB | Age: 69
End: 2024-03-19

## 2024-03-19 DIAGNOSIS — E89.0 POSTOPERATIVE HYPOTHYROIDISM: ICD-10-CM

## 2024-03-19 LAB
T4 FREE SERPL-MCNC: 1.53 NG/DL (ref 0.93–1.68)
TSH SERPL DL<=0.005 MIU/L-ACNC: 3.88 UIU/ML (ref 0.4–4.2)

## 2024-04-22 RX ORDER — INSULIN LISPRO 100 [IU]/ML
INJECTION, SOLUTION INTRAVENOUS; SUBCUTANEOUS
Qty: 75 ML | Refills: 3 | Status: SHIPPED | OUTPATIENT
Start: 2024-04-22

## 2024-04-22 NOTE — TELEPHONE ENCOUNTER
Patient of Dr. Guerra     Last ordered 3/30/23, disp 75 ml, refill 3     Last visit 2/22:    Type - 1 DM - taking humalog and lantus today's A1c was high and needs to take 3-4  insulin injections /day ,   She claims her insurance does not cover  CDE. , she was on Lantus 24 U am  and 7 U  pm.  and check urine for proteinuria .  She is using quan now, claims her insurance does not cover DM clinic.  She had recent cataract,  due to ortho static Hypotensive, she is not ACE or ARB. I recommend increasing the morning Lantus to 27 units send at the sugars in the morning continues to run on the higher side, on a daily basis then she is encouraged to increase her evening insulin from 7 to 10 units and call our office with an update.     Next visit 6/27

## 2024-05-30 ENCOUNTER — NURSE ONLY (OUTPATIENT)
Dept: LAB | Age: 69
End: 2024-05-30

## 2024-05-30 DIAGNOSIS — E89.0 POSTOPERATIVE HYPOTHYROIDISM: ICD-10-CM

## 2024-05-30 DIAGNOSIS — E89.0 STATUS POST TOTAL THYROIDECTOMY: ICD-10-CM

## 2024-05-30 LAB — TSH SERPL DL<=0.005 MIU/L-ACNC: 1.94 UIU/ML (ref 0.4–4.2)

## 2024-06-26 ENCOUNTER — TELEPHONE (OUTPATIENT)
Dept: INTERNAL MEDICINE CLINIC | Age: 69
End: 2024-06-26

## 2024-06-26 DIAGNOSIS — E89.0 POSTOPERATIVE HYPOTHYROIDISM: ICD-10-CM

## 2024-06-26 DIAGNOSIS — E10.65 TYPE 1 DIABETES MELLITUS WITH HYPERGLYCEMIA (HCC): Primary | ICD-10-CM

## 2024-06-26 NOTE — TELEPHONE ENCOUNTER
Patient is requesting referral to Dr. Jamshid Barreto, endocrinology- [place].     Please advise.

## 2024-08-28 NOTE — ED NOTES
Pt resting on cot at this time. No needs voiced. Will continue to monitor.       [address], RN  05/18/23 1663 Detail Level: Simple Detail Level: Zone Detail Level: Detailed Detail Level: Generalized

## 2024-09-01 ENCOUNTER — APPOINTMENT (OUTPATIENT)
Dept: GENERAL RADIOLOGY | Age: 69
End: 2024-09-01
Payer: MEDICARE

## 2024-09-01 ENCOUNTER — HOSPITAL ENCOUNTER (OUTPATIENT)
Age: 69
Setting detail: OBSERVATION
Discharge: HOME | End: 2024-09-03
Attending: EMERGENCY MEDICINE
Payer: MEDICARE

## 2024-09-01 ENCOUNTER — APPOINTMENT (OUTPATIENT)
Dept: CT IMAGING | Age: 69
End: 2024-09-01
Payer: MEDICARE

## 2024-09-01 DIAGNOSIS — R06.02 SHORTNESS OF BREATH: Primary | ICD-10-CM

## 2024-09-01 LAB
ANION GAP SERPL CALC-SCNC: 18 MEQ/L (ref 8–16)
B-OH-BUTYR SERPL-MSCNC: 1.7 MG/DL (ref 0.2–2.81)
BASE EXCESS BLDA CALC-SCNC: -1.2 MMOL/L (ref -2–3)
BASOPHILS ABSOLUTE: 0 THOU/MM3 (ref 0–0.1)
BASOPHILS NFR BLD AUTO: 0.6 %
BUN SERPL-MCNC: 26 MG/DL (ref 7–22)
CALCIUM SERPL-MCNC: 9.4 MG/DL (ref 8.5–10.5)
CHLORIDE SERPL-SCNC: 96 MEQ/L (ref 98–111)
CO2 SERPL-SCNC: 17 MEQ/L (ref 23–33)
COLLECTED BY:: ABNORMAL
CREAT SERPL-MCNC: 1 MG/DL (ref 0.4–1.2)
D DIMER PPP IA.FEU-MCNC: 465 NG/ML FEU (ref 0–500)
DEPRECATED RDW RBC AUTO: 38.2 FL (ref 35–45)
DEVICE: ABNORMAL
EKG ATRIAL RATE: 99 BPM
EKG P AXIS: 66 DEGREES
EKG P-R INTERVAL: 172 MS
EKG Q-T INTERVAL: 350 MS
EKG QRS DURATION: 66 MS
EKG QTC CALCULATION (BAZETT): 449 MS
EKG R AXIS: 24 DEGREES
EKG T AXIS: 63 DEGREES
EKG VENTRICULAR RATE: 99 BPM
EOSINOPHIL NFR BLD AUTO: 1.1 %
EOSINOPHILS ABSOLUTE: 0.1 THOU/MM3 (ref 0–0.4)
ERYTHROCYTE [DISTWIDTH] IN BLOOD BY AUTOMATED COUNT: 12.1 % (ref 11.5–14.5)
FIO2 ON VENT O2 ANALYZER: 21 %
GFR SERPL CREATININE-BSD FRML MDRD: 62 ML/MIN/1.73M2
GLUCOSE SERPL-MCNC: 292 MG/DL (ref 70–108)
GLUCOSE SERPL-MCNC: 537 MG/DL (ref 70–108)
HCO3 BLDA-SCNC: 21 MMOL/L (ref 23–28)
HCT VFR BLD AUTO: 37.1 % (ref 37–47)
HGB BLD-MCNC: 12.2 GM/DL (ref 12–16)
IMM GRANULOCYTES # BLD AUTO: 0.01 THOU/MM3 (ref 0–0.07)
IMM GRANULOCYTES NFR BLD AUTO: 0.1 %
LYMPHOCYTES ABSOLUTE: 2.5 THOU/MM3 (ref 1–4.8)
LYMPHOCYTES NFR BLD AUTO: 30.9 %
MCH RBC QN AUTO: 28.3 PG (ref 26–33)
MCHC RBC AUTO-ENTMCNC: 32.9 GM/DL (ref 32.2–35.5)
MCV RBC AUTO: 86.1 FL (ref 81–99)
MONOCYTES ABSOLUTE: 0.7 THOU/MM3 (ref 0.4–1.3)
MONOCYTES NFR BLD AUTO: 8.4 %
NEUTROPHILS ABSOLUTE: 4.7 THOU/MM3 (ref 1.8–7.7)
NEUTROPHILS NFR BLD AUTO: 58.9 %
NRBC BLD AUTO-RTO: 0 /100 WBC
NT-PROBNP SERPL IA-MCNC: 36.1 PG/ML (ref 0–124)
OSMOLALITY SERPL CALC.SUM OF ELEC: 291.8 MOSMOL/KG (ref 275–300)
PCO2 TEMP ADJ BLDMV: 27 MMHG (ref 41–51)
PH BLDMV: 7.5 [PH] (ref 7.31–7.41)
PLATELET # BLD AUTO: 301 THOU/MM3 (ref 130–400)
PMV BLD AUTO: 9.5 FL (ref 9.4–12.4)
PO2 BLDMV: 21 MMHG (ref 25–40)
POTASSIUM SERPL-SCNC: 4.5 MEQ/L (ref 3.5–5.2)
RBC # BLD AUTO: 4.31 MILL/MM3 (ref 4.2–5.4)
SAO2 % BLDMV: 42 %
SITE: ABNORMAL
SODIUM SERPL-SCNC: 131 MEQ/L (ref 135–145)
TROPONIN, HIGH SENSITIVITY: 6 NG/L (ref 0–12)
TROPONIN, HIGH SENSITIVITY: < 6 NG/L (ref 0–12)
VENTILATION MODE VENT: ABNORMAL
WBC # BLD AUTO: 8 THOU/MM3 (ref 4.8–10.8)

## 2024-09-01 PROCEDURE — 80048 BASIC METABOLIC PNL TOTAL CA: CPT

## 2024-09-01 PROCEDURE — 2580000003 HC RX 258: Performed by: EMERGENCY MEDICINE

## 2024-09-01 PROCEDURE — G0378 HOSPITAL OBSERVATION PER HR: HCPCS

## 2024-09-01 PROCEDURE — 82803 BLOOD GASES ANY COMBINATION: CPT

## 2024-09-01 PROCEDURE — 99285 EMERGENCY DEPT VISIT HI MDM: CPT

## 2024-09-01 PROCEDURE — 82010 KETONE BODYS QUAN: CPT

## 2024-09-01 PROCEDURE — 84484 ASSAY OF TROPONIN QUANT: CPT

## 2024-09-01 PROCEDURE — 96374 THER/PROPH/DIAG INJ IV PUSH: CPT

## 2024-09-01 PROCEDURE — 6370000000 HC RX 637 (ALT 250 FOR IP): Performed by: EMERGENCY MEDICINE

## 2024-09-01 PROCEDURE — 85379 FIBRIN DEGRADATION QUANT: CPT

## 2024-09-01 PROCEDURE — 96375 TX/PRO/DX INJ NEW DRUG ADDON: CPT

## 2024-09-01 PROCEDURE — 93010 ELECTROCARDIOGRAM REPORT: CPT | Performed by: NUCLEAR MEDICINE

## 2024-09-01 PROCEDURE — 93005 ELECTROCARDIOGRAM TRACING: CPT | Performed by: EMERGENCY MEDICINE

## 2024-09-01 PROCEDURE — 71275 CT ANGIOGRAPHY CHEST: CPT

## 2024-09-01 PROCEDURE — 36415 COLL VENOUS BLD VENIPUNCTURE: CPT

## 2024-09-01 PROCEDURE — 99223 1ST HOSP IP/OBS HIGH 75: CPT

## 2024-09-01 PROCEDURE — 85025 COMPLETE CBC W/AUTO DIFF WBC: CPT

## 2024-09-01 PROCEDURE — 6360000004 HC RX CONTRAST MEDICATION: Performed by: STUDENT IN AN ORGANIZED HEALTH CARE EDUCATION/TRAINING PROGRAM

## 2024-09-01 PROCEDURE — 83880 ASSAY OF NATRIURETIC PEPTIDE: CPT

## 2024-09-01 PROCEDURE — 96361 HYDRATE IV INFUSION ADD-ON: CPT

## 2024-09-01 PROCEDURE — 2580000003 HC RX 258

## 2024-09-01 PROCEDURE — 6360000002 HC RX W HCPCS: Performed by: STUDENT IN AN ORGANIZED HEALTH CARE EDUCATION/TRAINING PROGRAM

## 2024-09-01 PROCEDURE — 71045 X-RAY EXAM CHEST 1 VIEW: CPT

## 2024-09-01 PROCEDURE — 82947 ASSAY GLUCOSE BLOOD QUANT: CPT

## 2024-09-01 RX ORDER — METOPROLOL TARTRATE 25 MG/1
12.5 TABLET, FILM COATED ORAL DAILY
Status: DISCONTINUED | OUTPATIENT
Start: 2024-09-02 | End: 2024-09-03 | Stop reason: HOSPADM

## 2024-09-01 RX ORDER — POTASSIUM CHLORIDE 7.45 MG/ML
10 INJECTION INTRAVENOUS PRN
Status: DISCONTINUED | OUTPATIENT
Start: 2024-09-01 | End: 2024-09-03 | Stop reason: HOSPADM

## 2024-09-01 RX ORDER — DIPHENHYDRAMINE HCL 25 MG
25 TABLET ORAL EVERY 6 HOURS PRN
Status: DISCONTINUED | OUTPATIENT
Start: 2024-09-01 | End: 2024-09-01

## 2024-09-01 RX ORDER — HYDROXYZINE PAMOATE 50 MG/1
50 CAPSULE ORAL 3 TIMES DAILY PRN
Status: DISCONTINUED | OUTPATIENT
Start: 2024-09-01 | End: 2024-09-03 | Stop reason: HOSPADM

## 2024-09-01 RX ORDER — SODIUM CHLORIDE 9 MG/ML
INJECTION, SOLUTION INTRAVENOUS PRN
Status: DISCONTINUED | OUTPATIENT
Start: 2024-09-01 | End: 2024-09-03 | Stop reason: HOSPADM

## 2024-09-01 RX ORDER — GLUCAGON 1 MG/ML
1 KIT INJECTION PRN
Status: DISCONTINUED | OUTPATIENT
Start: 2024-09-01 | End: 2024-09-03 | Stop reason: HOSPADM

## 2024-09-01 RX ORDER — INSULIN GLARGINE 100 [IU]/ML
10 INJECTION, SOLUTION SUBCUTANEOUS NIGHTLY
Status: DISCONTINUED | OUTPATIENT
Start: 2024-09-02 | End: 2024-09-03 | Stop reason: HOSPADM

## 2024-09-01 RX ORDER — LEVOTHYROXINE SODIUM 137 UG/1
137 TABLET ORAL DAILY
Status: DISCONTINUED | OUTPATIENT
Start: 2024-09-02 | End: 2024-09-03 | Stop reason: HOSPADM

## 2024-09-01 RX ORDER — SODIUM CHLORIDE, SODIUM LACTATE, POTASSIUM CHLORIDE, CALCIUM CHLORIDE 600; 310; 30; 20 MG/100ML; MG/100ML; MG/100ML; MG/100ML
INJECTION, SOLUTION INTRAVENOUS CONTINUOUS
Status: ACTIVE | OUTPATIENT
Start: 2024-09-01 | End: 2024-09-02

## 2024-09-01 RX ORDER — ONDANSETRON 2 MG/ML
4 INJECTION INTRAMUSCULAR; INTRAVENOUS EVERY 6 HOURS PRN
Status: DISCONTINUED | OUTPATIENT
Start: 2024-09-01 | End: 2024-09-03 | Stop reason: HOSPADM

## 2024-09-01 RX ORDER — INSULIN GLARGINE 100 [IU]/ML
29 INJECTION, SOLUTION SUBCUTANEOUS EVERY MORNING
Status: DISCONTINUED | OUTPATIENT
Start: 2024-09-02 | End: 2024-09-03 | Stop reason: HOSPADM

## 2024-09-01 RX ORDER — ACETAMINOPHEN 650 MG/1
650 SUPPOSITORY RECTAL EVERY 6 HOURS PRN
Status: DISCONTINUED | OUTPATIENT
Start: 2024-09-01 | End: 2024-09-03 | Stop reason: HOSPADM

## 2024-09-01 RX ORDER — LORAZEPAM 2 MG/ML
1 INJECTION INTRAMUSCULAR ONCE
Status: COMPLETED | OUTPATIENT
Start: 2024-09-01 | End: 2024-09-01

## 2024-09-01 RX ORDER — POLYETHYLENE GLYCOL 3350 17 G/17G
17 POWDER, FOR SOLUTION ORAL DAILY PRN
Status: DISCONTINUED | OUTPATIENT
Start: 2024-09-01 | End: 2024-09-03 | Stop reason: HOSPADM

## 2024-09-01 RX ORDER — ACETAMINOPHEN 325 MG/1
650 TABLET ORAL EVERY 6 HOURS PRN
Status: DISCONTINUED | OUTPATIENT
Start: 2024-09-01 | End: 2024-09-03 | Stop reason: HOSPADM

## 2024-09-01 RX ORDER — MIDODRINE HYDROCHLORIDE 5 MG/1
5 TABLET ORAL
Status: DISCONTINUED | OUTPATIENT
Start: 2024-09-02 | End: 2024-09-03 | Stop reason: HOSPADM

## 2024-09-01 RX ORDER — ASPIRIN 81 MG/1
81 TABLET, CHEWABLE ORAL DAILY
Status: DISCONTINUED | OUTPATIENT
Start: 2024-09-02 | End: 2024-09-03 | Stop reason: HOSPADM

## 2024-09-01 RX ORDER — IOPAMIDOL 755 MG/ML
80 INJECTION, SOLUTION INTRAVASCULAR
Status: COMPLETED | OUTPATIENT
Start: 2024-09-01 | End: 2024-09-01

## 2024-09-01 RX ORDER — SODIUM CHLORIDE 0.9 % (FLUSH) 0.9 %
5-40 SYRINGE (ML) INJECTION PRN
Status: DISCONTINUED | OUTPATIENT
Start: 2024-09-01 | End: 2024-09-03 | Stop reason: HOSPADM

## 2024-09-01 RX ORDER — LATANOPROST 50 UG/ML
1 SOLUTION/ DROPS OPHTHALMIC NIGHTLY
Status: DISCONTINUED | OUTPATIENT
Start: 2024-09-02 | End: 2024-09-03 | Stop reason: HOSPADM

## 2024-09-01 RX ORDER — SODIUM CHLORIDE 0.9 % (FLUSH) 0.9 %
5-40 SYRINGE (ML) INJECTION EVERY 12 HOURS SCHEDULED
Status: DISCONTINUED | OUTPATIENT
Start: 2024-09-01 | End: 2024-09-03 | Stop reason: HOSPADM

## 2024-09-01 RX ORDER — ONDANSETRON 4 MG/1
4 TABLET, ORALLY DISINTEGRATING ORAL EVERY 8 HOURS PRN
Status: DISCONTINUED | OUTPATIENT
Start: 2024-09-01 | End: 2024-09-03 | Stop reason: HOSPADM

## 2024-09-01 RX ORDER — INSULIN LISPRO 100 [IU]/ML
7 INJECTION, SOLUTION INTRAVENOUS; SUBCUTANEOUS
Status: DISCONTINUED | OUTPATIENT
Start: 2024-09-02 | End: 2024-09-03 | Stop reason: HOSPADM

## 2024-09-01 RX ORDER — METOPROLOL TARTRATE 25 MG/1
12.5 TABLET, FILM COATED ORAL DAILY
COMMUNITY

## 2024-09-01 RX ORDER — OMEGA-3-ACID ETHYL ESTERS 1 G/1
1 CAPSULE, LIQUID FILLED ORAL 2 TIMES DAILY
Status: DISCONTINUED | OUTPATIENT
Start: 2024-09-02 | End: 2024-09-03 | Stop reason: HOSPADM

## 2024-09-01 RX ORDER — ASPIRIN 81 MG/1
81 TABLET, CHEWABLE ORAL DAILY
COMMUNITY

## 2024-09-01 RX ORDER — PRASUGREL 10 MG/1
10 TABLET, FILM COATED ORAL DAILY
COMMUNITY

## 2024-09-01 RX ORDER — MAGNESIUM SULFATE IN WATER 40 MG/ML
2000 INJECTION, SOLUTION INTRAVENOUS PRN
Status: DISCONTINUED | OUTPATIENT
Start: 2024-09-01 | End: 2024-09-03 | Stop reason: HOSPADM

## 2024-09-01 RX ORDER — DIPHENHYDRAMINE HCL 25 MG
25 TABLET ORAL ONCE
Status: COMPLETED | OUTPATIENT
Start: 2024-09-02 | End: 2024-09-02

## 2024-09-01 RX ORDER — DORZOLAMIDE HCL 20 MG/ML
1 SOLUTION/ DROPS OPHTHALMIC 2 TIMES DAILY
Status: DISCONTINUED | OUTPATIENT
Start: 2024-09-02 | End: 2024-09-03 | Stop reason: HOSPADM

## 2024-09-01 RX ORDER — 0.9 % SODIUM CHLORIDE 0.9 %
2000 INTRAVENOUS SOLUTION INTRAVENOUS ONCE
Status: COMPLETED | OUTPATIENT
Start: 2024-09-01 | End: 2024-09-01

## 2024-09-01 RX ORDER — VITAMIN B COMPLEX
1000 TABLET ORAL DAILY
Status: DISCONTINUED | OUTPATIENT
Start: 2024-09-02 | End: 2024-09-03 | Stop reason: HOSPADM

## 2024-09-01 RX ORDER — POTASSIUM CHLORIDE 1500 MG/1
40 TABLET, EXTENDED RELEASE ORAL PRN
Status: DISCONTINUED | OUTPATIENT
Start: 2024-09-01 | End: 2024-09-03 | Stop reason: HOSPADM

## 2024-09-01 RX ORDER — PAROXETINE 20 MG/1
20 TABLET, FILM COATED ORAL DAILY
Status: DISCONTINUED | OUTPATIENT
Start: 2024-09-02 | End: 2024-09-03 | Stop reason: HOSPADM

## 2024-09-01 RX ORDER — DEXTROSE MONOHYDRATE 100 MG/ML
INJECTION, SOLUTION INTRAVENOUS CONTINUOUS PRN
Status: DISCONTINUED | OUTPATIENT
Start: 2024-09-01 | End: 2024-09-03 | Stop reason: HOSPADM

## 2024-09-01 RX ORDER — EZETIMIBE 10 MG/1
10 TABLET ORAL DAILY
Status: DISCONTINUED | OUTPATIENT
Start: 2024-09-02 | End: 2024-09-03 | Stop reason: HOSPADM

## 2024-09-01 RX ADMIN — SODIUM CHLORIDE 2000 ML: 9 INJECTION, SOLUTION INTRAVENOUS at 13:43

## 2024-09-01 RX ADMIN — SODIUM CHLORIDE, POTASSIUM CHLORIDE, SODIUM LACTATE AND CALCIUM CHLORIDE: 600; 310; 30; 20 INJECTION, SOLUTION INTRAVENOUS at 22:14

## 2024-09-01 RX ADMIN — LORAZEPAM 1 MG: 2 INJECTION INTRAMUSCULAR; INTRAVENOUS at 18:41

## 2024-09-01 RX ADMIN — Medication 10 UNITS: at 13:38

## 2024-09-01 RX ADMIN — IOPAMIDOL 80 ML: 755 INJECTION, SOLUTION INTRAVENOUS at 18:05

## 2024-09-01 RX ADMIN — SODIUM CHLORIDE, PRESERVATIVE FREE 10 ML: 5 INJECTION INTRAVENOUS at 22:14

## 2024-09-01 ASSESSMENT — PAIN - FUNCTIONAL ASSESSMENT
PAIN_FUNCTIONAL_ASSESSMENT: 0-10
PAIN_FUNCTIONAL_ASSESSMENT: NONE - DENIES PAIN
PAIN_FUNCTIONAL_ASSESSMENT: NONE - DENIES PAIN
PAIN_FUNCTIONAL_ASSESSMENT: 0-10
PAIN_FUNCTIONAL_ASSESSMENT: NONE - DENIES PAIN
PAIN_FUNCTIONAL_ASSESSMENT: NONE - DENIES PAIN
PAIN_FUNCTIONAL_ASSESSMENT: 0-10

## 2024-09-01 ASSESSMENT — PAIN SCALES - GENERAL
PAINLEVEL_OUTOF10: 6
PAINLEVEL_OUTOF10: 2
PAINLEVEL_OUTOF10: 5
PAINLEVEL_OUTOF10: 3

## 2024-09-01 ASSESSMENT — PAIN DESCRIPTION - LOCATION
LOCATION: CHEST
LOCATION: HEAD
LOCATION: CHEST
LOCATION: CHEST

## 2024-09-01 ASSESSMENT — PAIN DESCRIPTION - PAIN TYPE: TYPE: ACUTE PAIN

## 2024-09-01 ASSESSMENT — PAIN DESCRIPTION - DESCRIPTORS: DESCRIPTORS: ACHING

## 2024-09-01 NOTE — ED NOTES
Pt reassessed at this time. Vitals stable with telemetry in place. Call light in reach. Family at bedside.

## 2024-09-01 NOTE — ED TRIAGE NOTES
Pt presents to the ER with c/o SOB. Pt also reports she usually has chest pressure but today turned into chest pain. Pt states she had a heart cath done 2 weeks ago with stent placement. Upon arrival, pt is alert and oriented. Respirations are labored but even, VSS. EKG completed. IV established

## 2024-09-01 NOTE — ED PROVIDER NOTES
Transfer of Care Note:   Physician Signing out: Laine  Receiving Physician: Rudy Hernández DO  Sign out time: 1600      Brief history:  69-year-old female with multiple days of shortness of breath.  No known exacerbating alleviating factors was recently discharged from local emergency department with negative workup.  Coming here for reevaluation.    Items pending that need to be checked:  Repeat troponin      Expected disposition of patient:  Pending results, discharged.        Additional Assessment and results:   I have personally performed a face to face diagnostic evaluation on this patient. The patient's initial evaluation and plan have been discussed with the prior physician who initially evaluated the patient. Nursing Notes, Past Medical Hx, Past Surgical Hx, Social Hx, Allergies, vital signs and Family Hx were all reviewed.      Vitals:    09/01/24 1931   BP: (!) 152/69   Pulse: 92   Resp: 18   Temp:    SpO2: 97%     Physical Exam  Tachypnea, equal bilateral breath sounds, no wheezes rales or rhonchi.  Patient without any tachycardia or respiratory distress.    Labs Reviewed   BASIC METABOLIC PANEL W/ REFLEX TO MG FOR LOW K - Abnormal; Notable for the following components:       Result Value    Sodium 131 (*)     Chloride 96 (*)     CO2 17 (*)     Glucose 537 (*)     BUN 26 (*)     All other components within normal limits   ANION GAP - Abnormal; Notable for the following components:    Anion Gap 18.0 (*)     All other components within normal limits   GLUCOSE, RANDOM - Abnormal; Notable for the following components:    Glucose 292 (*)     All other components within normal limits   BLOOD GAS, VENOUS - Abnormal; Notable for the following components:    PH MIXED 7.50 (*)     PCO2, MIXED VENOUS 27 (*)     PO2, Mixed 21 (*)     HCO3, Mixed 21 (*)     All other components within normal limits   BRAIN NATRIURETIC PEPTIDE   CBC WITH AUTO DIFFERENTIAL   TROPONIN   GLOMERULAR FILTRATION RATE, ESTIMATED 
range)   PARoxetine (PAXIL) tablet 20 mg (has no administration in time range)   Vitamin D (CHOLECALCIFEROL) tablet 1,000 Units (has no administration in time range)   vitamin E capsule 1,000 Units (has no administration in time range)   sodium chloride 0.9 % bolus 2,000 mL (0 mLs IntraVENous Stopped 9/1/24 1610)   insulin regular (HumuLIN R;NovoLIN R) injection 10 Units (10 Units IntraVENous Given 9/1/24 1338)   iopamidol (ISOVUE-370) 76 % injection 80 mL (80 mLs IntraVENous Given 9/1/24 1805)   LORazepam (ATIVAN) injection 1 mg (1 mg IntraVENous Given 9/1/24 1841)   diphenhydrAMINE (BENADRYL) tablet 25 mg (25 mg Oral Given 9/2/24 0000)               DIAGNOSTIC RESULTS     EKG: All EKG's are interpreted by the Emergency Department Physician who either signs or Co-signs this chart in the absence of a cardiologist.      RADIOLOGY: non-plain film images(s) such as CT, Ultrasound and MRI are read by the radiologist.  Plain radiographic images are visualized and preliminarily interpreted by the emergency physician unless otherwise stated below.    LABS:   Labs Reviewed   BASIC METABOLIC PANEL W/ REFLEX TO MG FOR LOW K - Abnormal; Notable for the following components:       Result Value    Sodium 131 (*)     Chloride 96 (*)     CO2 17 (*)     Glucose 537 (*)     BUN 26 (*)     All other components within normal limits   ANION GAP - Abnormal; Notable for the following components:    Anion Gap 18.0 (*)     All other components within normal limits   GLUCOSE, RANDOM - Abnormal; Notable for the following components:    Glucose 292 (*)     All other components within normal limits   BLOOD GAS, VENOUS - Abnormal; Notable for the following components:    PH MIXED 7.50 (*)     PCO2, MIXED VENOUS 27 (*)     PO2, Mixed 21 (*)     HCO3, Mixed 21 (*)     All other components within normal limits   BASIC METABOLIC PANEL - Abnormal; Notable for the following components:    CO2 21 (*)     Glucose 211 (*)     Calcium 8.3 (*)     All

## 2024-09-01 NOTE — ED NOTES
Bladder scan retrieved post-void at 160mL. Pt reports feeling much better with less pressure on bladder.

## 2024-09-01 NOTE — ED NOTES
Pt reports bladder feels extremely full and pt is having a hard time urinating. Pt hooked up to purewick and urinated around 200 mL. Bladder scan performed post void and patient had 618 mL in bladder.

## 2024-09-01 NOTE — ED NOTES
Pt medicated per MAR at this time. Vitals stable with telemetry in place. Pt very short of breath while taking. Family at bedside.

## 2024-09-01 NOTE — H&P
[facility]   Tobacco Use    Smoking status: Never    Smokeless tobacco: Never   Vaping Use    Vaping status: Never Used   Substance and Sexual Activity    Alcohol use: No    Drug use: No     Social Determinants of Health     Financial Resource Strain: Low Risk  (5/18/2023)    Overall Financial Resource Strain (CARDIA)     Difficulty of Paying Living Expenses: Not hard at all   Transportation Needs: Unknown (5/18/2023)    PRAPARE - Transportation     Lack of Transportation (Non-Medical): No   Housing Stability: Unknown (5/18/2023)    Housing Stability Vital Sign     Unstable Housing in the Last Year: No        Code status: Full Code     Thank you Tyson Shields APRN - CNP for the opportunity to be involved in this patient's care.    Electronically signed by GONSALO Pineda CNP on 9/1/2024 at 7:26 PM.

## 2024-09-02 ENCOUNTER — APPOINTMENT (OUTPATIENT)
Age: 69
End: 2024-09-02
Payer: MEDICARE

## 2024-09-02 LAB
ANION GAP SERPL CALC-SCNC: 11 MEQ/L (ref 8–16)
BUN SERPL-MCNC: 12 MG/DL (ref 7–22)
CALCIUM SERPL-MCNC: 8.3 MG/DL (ref 8.5–10.5)
CHLORIDE SERPL-SCNC: 108 MEQ/L (ref 98–111)
CO2 SERPL-SCNC: 21 MEQ/L (ref 23–33)
CREAT SERPL-MCNC: 0.8 MG/DL (ref 0.4–1.2)
DEPRECATED RDW RBC AUTO: 41.1 FL (ref 35–45)
ECHO AO ASC DIAM: 2.9 CM
ECHO AO ASCENDING AORTA INDEX: 1.39 CM/M2
ECHO AV CUSP MM: 1.9 CM
ECHO AV PEAK GRADIENT: 6 MMHG
ECHO AV PEAK VELOCITY: 1.2 M/S
ECHO AV VELOCITY RATIO: 0.92
ECHO BSA: 2.13 M2
ECHO IVC PROX: 1.6 CM
ECHO LA AREA 2C: 18.8 CM2
ECHO LA AREA 4C: 15.3 CM2
ECHO LA DIAMETER INDEX: 1.53 CM/M2
ECHO LA DIAMETER: 3.2 CM
ECHO LA MAJOR AXIS: 4.9 CM
ECHO LA MINOR AXIS: 5.2 CM
ECHO LA VOL BP: 48 ML (ref 22–52)
ECHO LA VOL MOD A2C: 56 ML (ref 22–52)
ECHO LA VOL MOD A4C: 38 ML (ref 22–52)
ECHO LA VOL/BSA BIPLANE: 23 ML/M2 (ref 16–34)
ECHO LA VOLUME INDEX MOD A2C: 27 ML/M2 (ref 16–34)
ECHO LA VOLUME INDEX MOD A4C: 18 ML/M2 (ref 16–34)
ECHO LV E' LATERAL VELOCITY: 11 CM/S
ECHO LV E' SEPTAL VELOCITY: 11 CM/S
ECHO LV EJECTION FRACTION BIPLANE: 62 % (ref 55–100)
ECHO LV FRACTIONAL SHORTENING: 33 % (ref 28–44)
ECHO LV INTERNAL DIMENSION DIASTOLE INDEX: 2.15 CM/M2
ECHO LV INTERNAL DIMENSION DIASTOLIC: 4.5 CM (ref 3.9–5.3)
ECHO LV INTERNAL DIMENSION SYSTOLIC INDEX: 1.44 CM/M2
ECHO LV INTERNAL DIMENSION SYSTOLIC: 3 CM
ECHO LV ISOVOLUMETRIC RELAXATION TIME (IVRT): 67 MS
ECHO LV IVSD: 0.9 CM (ref 0.6–0.9)
ECHO LV MASS 2D: 132.8 G (ref 67–162)
ECHO LV MASS INDEX 2D: 63.6 G/M2 (ref 43–95)
ECHO LV POSTERIOR WALL DIASTOLIC: 0.9 CM (ref 0.6–0.9)
ECHO LV RELATIVE WALL THICKNESS RATIO: 0.4
ECHO LVOT PEAK GRADIENT: 4 MMHG
ECHO LVOT PEAK VELOCITY: 1.1 M/S
ECHO MV A VELOCITY: 0.87 M/S
ECHO MV E DECELERATION TIME (DT): 204 MS
ECHO MV E VELOCITY: 1 M/S
ECHO MV E/A RATIO: 1.15
ECHO MV E/E' LATERAL: 9.09
ECHO MV E/E' RATIO (AVERAGED): 9.09
ECHO MV E/E' SEPTAL: 9.09
ECHO MV REGURGITANT PEAK GRADIENT: 61 MMHG
ECHO MV REGURGITANT PEAK VELOCITY: 3.9 M/S
ECHO PV MAX VELOCITY: 0.5 M/S
ECHO PV PEAK GRADIENT: 1 MMHG
ECHO RV INTERNAL DIMENSION: 2.4 CM
ECHO RV TAPSE: 2.1 CM (ref 1.7–?)
ECHO TV E WAVE: 0.6 M/S
ECHO TV REGURGITANT MAX VELOCITY: 2.67 M/S
ECHO TV REGURGITANT PEAK GRADIENT: 29 MMHG
ERYTHROCYTE [DISTWIDTH] IN BLOOD BY AUTOMATED COUNT: 12.5 % (ref 11.5–14.5)
GFR SERPL CREATININE-BSD FRML MDRD: 80 ML/MIN/1.73M2
GLUCOSE BLD STRIP.AUTO-MCNC: 174 MG/DL (ref 70–108)
GLUCOSE BLD STRIP.AUTO-MCNC: 261 MG/DL (ref 70–108)
GLUCOSE BLD STRIP.AUTO-MCNC: 281 MG/DL (ref 70–108)
GLUCOSE BLD STRIP.AUTO-MCNC: 326 MG/DL (ref 70–108)
GLUCOSE SERPL-MCNC: 211 MG/DL (ref 70–108)
HCT VFR BLD AUTO: 32.7 % (ref 37–47)
HGB BLD-MCNC: 10.3 GM/DL (ref 12–16)
MAGNESIUM SERPL-MCNC: 1.8 MG/DL (ref 1.6–2.4)
MCH RBC QN AUTO: 28.4 PG (ref 26–33)
MCHC RBC AUTO-ENTMCNC: 31.5 GM/DL (ref 32.2–35.5)
MCV RBC AUTO: 90.1 FL (ref 81–99)
PLATELET # BLD AUTO: 248 THOU/MM3 (ref 130–400)
PMV BLD AUTO: 9.4 FL (ref 9.4–12.4)
POTASSIUM SERPL-SCNC: 3.9 MEQ/L (ref 3.5–5.2)
RBC # BLD AUTO: 3.63 MILL/MM3 (ref 4.2–5.4)
SODIUM SERPL-SCNC: 140 MEQ/L (ref 135–145)
WBC # BLD AUTO: 5.9 THOU/MM3 (ref 4.8–10.8)

## 2024-09-02 PROCEDURE — 83735 ASSAY OF MAGNESIUM: CPT

## 2024-09-02 PROCEDURE — G0378 HOSPITAL OBSERVATION PER HR: HCPCS

## 2024-09-02 PROCEDURE — 93306 TTE W/DOPPLER COMPLETE: CPT | Performed by: NUCLEAR MEDICINE

## 2024-09-02 PROCEDURE — 80048 BASIC METABOLIC PNL TOTAL CA: CPT

## 2024-09-02 PROCEDURE — 6370000000 HC RX 637 (ALT 250 FOR IP)

## 2024-09-02 PROCEDURE — 36415 COLL VENOUS BLD VENIPUNCTURE: CPT

## 2024-09-02 PROCEDURE — 99223 1ST HOSP IP/OBS HIGH 75: CPT | Performed by: NUCLEAR MEDICINE

## 2024-09-02 PROCEDURE — 85027 COMPLETE CBC AUTOMATED: CPT

## 2024-09-02 PROCEDURE — 6370000000 HC RX 637 (ALT 250 FOR IP): Performed by: PHYSICIAN ASSISTANT

## 2024-09-02 PROCEDURE — 99232 SBSQ HOSP IP/OBS MODERATE 35: CPT | Performed by: PHYSICIAN ASSISTANT

## 2024-09-02 PROCEDURE — 2580000003 HC RX 258

## 2024-09-02 PROCEDURE — 93306 TTE W/DOPPLER COMPLETE: CPT

## 2024-09-02 PROCEDURE — 82948 REAGENT STRIP/BLOOD GLUCOSE: CPT

## 2024-09-02 RX ORDER — CLOPIDOGREL BISULFATE 75 MG/1
75 TABLET ORAL DAILY
Status: DISCONTINUED | OUTPATIENT
Start: 2024-09-02 | End: 2024-09-03 | Stop reason: HOSPADM

## 2024-09-02 RX ADMIN — Medication 1000 UNITS: at 09:59

## 2024-09-02 RX ADMIN — EZETIMIBE 10 MG: 10 TABLET ORAL at 09:59

## 2024-09-02 RX ADMIN — LATANOPROST 1 DROP: 50 SOLUTION OPHTHALMIC at 20:16

## 2024-09-02 RX ADMIN — DORZOLAMIDE HCL 1 DROP: 20 SOLUTION/ DROPS OPHTHALMIC at 09:57

## 2024-09-02 RX ADMIN — INSULIN LISPRO 7 UNITS: 100 INJECTION, SOLUTION INTRAVENOUS; SUBCUTANEOUS at 12:40

## 2024-09-02 RX ADMIN — CLOPIDOGREL BISULFATE 75 MG: 75 TABLET ORAL at 12:18

## 2024-09-02 RX ADMIN — DIPHENHYDRAMINE HYDROCHLORIDE 25 MG: 25 TABLET ORAL at 00:00

## 2024-09-02 RX ADMIN — APIXABAN 5 MG: 5 TABLET, FILM COATED ORAL at 21:35

## 2024-09-02 RX ADMIN — OMEGA-3-ACID ETHYL ESTERS CAPSULES 1 CAPSULE: 1 CAPSULE, LIQUID FILLED ORAL at 09:59

## 2024-09-02 RX ADMIN — ASPIRIN 81 MG: 81 TABLET, CHEWABLE ORAL at 09:59

## 2024-09-02 RX ADMIN — LEVOTHYROXINE SODIUM 137 MCG: 0.14 TABLET ORAL at 07:00

## 2024-09-02 RX ADMIN — OMEGA-3-ACID ETHYL ESTERS CAPSULES 1 CAPSULE: 1 CAPSULE, LIQUID FILLED ORAL at 20:13

## 2024-09-02 RX ADMIN — INSULIN GLARGINE 10 UNITS: 100 INJECTION, SOLUTION SUBCUTANEOUS at 20:13

## 2024-09-02 RX ADMIN — SODIUM CHLORIDE, PRESERVATIVE FREE 10 ML: 5 INJECTION INTRAVENOUS at 20:16

## 2024-09-02 RX ADMIN — APIXABAN 5 MG: 5 TABLET, FILM COATED ORAL at 09:59

## 2024-09-02 RX ADMIN — ACETAMINOPHEN 650 MG: 325 TABLET ORAL at 00:00

## 2024-09-02 RX ADMIN — INSULIN LISPRO 7 UNITS: 100 INJECTION, SOLUTION INTRAVENOUS; SUBCUTANEOUS at 17:58

## 2024-09-02 RX ADMIN — DORZOLAMIDE HCL 1 DROP: 20 SOLUTION/ DROPS OPHTHALMIC at 20:15

## 2024-09-02 RX ADMIN — PAROXETINE HYDROCHLORIDE 20 MG: 20 TABLET, FILM COATED ORAL at 09:59

## 2024-09-02 RX ADMIN — METOPROLOL TARTRATE 12.5 MG: 25 TABLET, FILM COATED ORAL at 09:58

## 2024-09-02 RX ADMIN — INSULIN GLARGINE 29 UNITS: 100 INJECTION, SOLUTION SUBCUTANEOUS at 10:33

## 2024-09-02 ASSESSMENT — PAIN SCALES - GENERAL
PAINLEVEL_OUTOF10: 3
PAINLEVEL_OUTOF10: 0

## 2024-09-02 ASSESSMENT — PAIN DESCRIPTION - DESCRIPTORS: DESCRIPTORS: ACHING

## 2024-09-02 ASSESSMENT — PAIN DESCRIPTION - LOCATION: LOCATION: HEAD

## 2024-09-02 NOTE — CONSULTS
[address] [address]                              CONSULTATION      PATIENT NAME: JOSELYN JONES                : 1955  MED REC NO: [id number]                       ROOM: [facility]  ACCOUNT NO: [id number]                       ADMIT DATE: 2024  PROVIDER: Helena English MD    CARDIOLOGY CONSULT    CONSULT DATE: 2024    REFERRING PHYSICIAN:  Hospitalist Service      REASON FOR CONSULTATION:  Chest pain, shortness of breath.    HISTORY OF PRESENT ILLNESS:  This is a very pleasant 69-year-old patient, who is a Confucianism, who follows with Dr. Macdonald at [facility].  Apparently, the patient has been having symptoms of chest pressure for the last several months until lately when she had a heart catheterization at [facility] not too long ago.  Was told that she had significant disease in the left anterior descending, for which she underwent angioplasty and stenting.  Note that, as of right now, the reports are not available, and history was mainly obtained from the patient herself.  It looks like after the stent, she continued to have similar symptoms, for which she was taken back to the cath lab 2 weeks later with repeat cardiac catheterization, and supposedly, some type of balloon angioplasty was done at that time.  The patient went home, did a little bit better, but then started having the symptoms again described as shortness of breath and chest heaviness, both at rest and with exertion, probably mostly with exertion.  No specific triggers.  No specific radiation.  We are consulted to assist in the management of the patient.    REVIEW OF SYSTEMS:  1. No fever, no chills, no weight loss.  2. No hematuria or dysuria.  3. No abdominal pain, nausea, vomiting, or diarrhea.  4. No obvious suicidal or active psych history.  5. No skin rashes.  6. No obvious dizziness, lightheadedness, or loss of

## 2024-09-02 NOTE — ED NOTES
Called 8B and spoke with staff who approved patient transport to [facility]. Patient is stable.

## 2024-09-02 NOTE — PLAN OF CARE
Problem: Discharge Planning  Goal: Discharge to home or other facility with appropriate resources  Outcome: Progressing  Flowsheets  Taken 9/1/2024 2308  Discharge to home or other facility with appropriate resources: Identify barriers to discharge with patient and caregiver  Taken 9/1/2024 2130  Discharge to home or other facility with appropriate resources: Identify barriers to discharge with patient and caregiver     Problem: Pain  Goal: Verbalizes/displays adequate comfort level or baseline comfort level  Outcome: Progressing  Flowsheets (Taken 9/1/2024 2130)  Verbalizes/displays adequate comfort level or baseline comfort level: Encourage patient to monitor pain and request assistance     Problem: ABCDS Injury Assessment  Goal: Absence of physical injury  Recent Flowsheet Documentation  Taken 9/1/2024 2130 by Shaye Aragon RN  Absence of Physical Injury: Implement safety measures based on patient assessment     Problem: Respiratory - Adult  Goal: Achieves optimal ventilation and oxygenation  Outcome: Progressing     Problem: Cardiovascular - Adult  Goal: Maintains optimal cardiac output and hemodynamic stability  Outcome: Progressing  Goal: Absence of cardiac dysrhythmias or at baseline  Outcome: Progressing

## 2024-09-03 VITALS
BODY MASS INDEX: 30.33 KG/M2 | TEMPERATURE: 98.4 F | HEART RATE: 65 BPM | SYSTOLIC BLOOD PRESSURE: 129 MMHG | WEIGHT: 204.81 LBS | OXYGEN SATURATION: 100 % | RESPIRATION RATE: 16 BRPM | HEIGHT: 69 IN | DIASTOLIC BLOOD PRESSURE: 70 MMHG

## 2024-09-03 LAB
ANION GAP SERPL CALC-SCNC: 11 MEQ/L (ref 8–16)
BUN SERPL-MCNC: 11 MG/DL (ref 7–22)
CALCIUM SERPL-MCNC: 8.8 MG/DL (ref 8.5–10.5)
CHLORIDE SERPL-SCNC: 108 MEQ/L (ref 98–111)
CO2 SERPL-SCNC: 23 MEQ/L (ref 23–33)
CREAT SERPL-MCNC: 0.9 MG/DL (ref 0.4–1.2)
DEPRECATED RDW RBC AUTO: 41 FL (ref 35–45)
ERYTHROCYTE [DISTWIDTH] IN BLOOD BY AUTOMATED COUNT: 12.4 % (ref 11.5–14.5)
GFR SERPL CREATININE-BSD FRML MDRD: 70 ML/MIN/1.73M2
GLUCOSE BLD STRIP.AUTO-MCNC: 123 MG/DL (ref 70–108)
GLUCOSE BLD STRIP.AUTO-MCNC: 132 MG/DL (ref 70–108)
GLUCOSE SERPL-MCNC: 90 MG/DL (ref 70–108)
HCT VFR BLD AUTO: 34.7 % (ref 37–47)
HGB BLD-MCNC: 11.1 GM/DL (ref 12–16)
MAGNESIUM SERPL-MCNC: 2.1 MG/DL (ref 1.6–2.4)
MCH RBC QN AUTO: 28.8 PG (ref 26–33)
MCHC RBC AUTO-ENTMCNC: 32 GM/DL (ref 32.2–35.5)
MCV RBC AUTO: 89.9 FL (ref 81–99)
PLATELET # BLD AUTO: 255 THOU/MM3 (ref 130–400)
PMV BLD AUTO: 9.2 FL (ref 9.4–12.4)
POTASSIUM SERPL-SCNC: 4.2 MEQ/L (ref 3.5–5.2)
RBC # BLD AUTO: 3.86 MILL/MM3 (ref 4.2–5.4)
SODIUM SERPL-SCNC: 142 MEQ/L (ref 135–145)
WBC # BLD AUTO: 6 THOU/MM3 (ref 4.8–10.8)

## 2024-09-03 PROCEDURE — 6370000000 HC RX 637 (ALT 250 FOR IP)

## 2024-09-03 PROCEDURE — 80048 BASIC METABOLIC PNL TOTAL CA: CPT

## 2024-09-03 PROCEDURE — 83735 ASSAY OF MAGNESIUM: CPT

## 2024-09-03 PROCEDURE — 2580000003 HC RX 258

## 2024-09-03 PROCEDURE — 6370000000 HC RX 637 (ALT 250 FOR IP): Performed by: PHYSICIAN ASSISTANT

## 2024-09-03 PROCEDURE — 99232 SBSQ HOSP IP/OBS MODERATE 35: CPT | Performed by: PHYSICIAN ASSISTANT

## 2024-09-03 PROCEDURE — 99239 HOSP IP/OBS DSCHRG MGMT >30: CPT | Performed by: PHYSICIAN ASSISTANT

## 2024-09-03 PROCEDURE — 85027 COMPLETE CBC AUTOMATED: CPT

## 2024-09-03 PROCEDURE — 36415 COLL VENOUS BLD VENIPUNCTURE: CPT

## 2024-09-03 PROCEDURE — 82948 REAGENT STRIP/BLOOD GLUCOSE: CPT

## 2024-09-03 PROCEDURE — G0378 HOSPITAL OBSERVATION PER HR: HCPCS

## 2024-09-03 RX ADMIN — OMEGA-3-ACID ETHYL ESTERS CAPSULES 1 CAPSULE: 1 CAPSULE, LIQUID FILLED ORAL at 09:19

## 2024-09-03 RX ADMIN — LEVOTHYROXINE SODIUM 137 MCG: 0.14 TABLET ORAL at 03:17

## 2024-09-03 RX ADMIN — DORZOLAMIDE HCL 1 DROP: 20 SOLUTION/ DROPS OPHTHALMIC at 09:17

## 2024-09-03 RX ADMIN — SODIUM CHLORIDE, PRESERVATIVE FREE 10 ML: 5 INJECTION INTRAVENOUS at 10:14

## 2024-09-03 RX ADMIN — MIDODRINE HYDROCHLORIDE 5 MG: 5 TABLET ORAL at 08:19

## 2024-09-03 RX ADMIN — Medication 1000 UNITS: at 10:14

## 2024-09-03 RX ADMIN — ACETAMINOPHEN 650 MG: 325 TABLET ORAL at 09:19

## 2024-09-03 RX ADMIN — ASPIRIN 81 MG: 81 TABLET, CHEWABLE ORAL at 09:17

## 2024-09-03 RX ADMIN — MIDODRINE HYDROCHLORIDE 5 MG: 5 TABLET ORAL at 11:30

## 2024-09-03 RX ADMIN — EZETIMIBE 10 MG: 10 TABLET ORAL at 10:12

## 2024-09-03 RX ADMIN — CLOPIDOGREL BISULFATE 75 MG: 75 TABLET ORAL at 09:17

## 2024-09-03 RX ADMIN — APIXABAN 5 MG: 5 TABLET, FILM COATED ORAL at 10:11

## 2024-09-03 RX ADMIN — PAROXETINE HYDROCHLORIDE 20 MG: 20 TABLET, FILM COATED ORAL at 10:13

## 2024-09-03 RX ADMIN — METOPROLOL TARTRATE 12.5 MG: 25 TABLET, FILM COATED ORAL at 10:13

## 2024-09-03 ASSESSMENT — PAIN SCALES - GENERAL
PAINLEVEL_OUTOF10: 4
PAINLEVEL_OUTOF10: 4

## 2024-09-03 NOTE — ACP (ADVANCE CARE PLANNING)
Advance Care Planning     Advance Care Planning Inpatient Note  Spiritual Care Department    Today's Date: 9/3/2024  Unit: [facility]    Received request from IDT Member.  Upon review of chart and communication with care team, Spiritual Care will defer advance care planning with patient at this time.. Patient and Spouse was/were present in the room during visit.    Goals of ACP Conversation:  Discuss advance care planning documents  Leave full, blank set of AD's, contact card and brochure, for pt review and study.    Health Care Decision Makers:       Primary Decision Maker: Indio Talamantes - Spouse - [phone]    Secondary Decision Maker: Janet Talamantes - Child - [phone]    Secondary Decision Maker: Rc Talamantes - Child - [phone]  Summary:  Documented Next of Kin, per patient report    Advance Care Planning Documents (Patient Wishes):  Healthcare Power of /Advance Directive Appointment of Health Care Agent  Living Will/Advance Directive     Assessment:  Pt is a 69y.o. female, sleeping soundly then woke briefly, in 8B-37; pt spouse is also in the room.  was going to leave materials initially, then briefly explained document purposes and contact information-left materials as indicated for review and study.    Interventions:  Left full, blank set of AD's, contact card and brochure, for pt review and study.    Care Preferences Communicated:   No    Outcomes/Plan:  Pt will notify spiritual health if/when ready to complete.    Electronically signed by Chaplain Bhavani on 9/3/2024 at 2:39 PM

## 2024-09-03 NOTE — PROGRESS NOTES
Attempted to call [facility] for medical records- no one was available to send today- will attempt to call tomorrow   
Cardiology Progress Note      Patient:  Palmira Talamantes  YOB: 1955  MRN: [id number]   Acct: [id number]  Admit Date:  9/1/2024  Primary Cardiologist:  dr macdonald    Note per dr gao \"REASON FOR CONSULTATION:  Chest pain, shortness of breath.     HISTORY OF PRESENT ILLNESS:  This is a very pleasant 69-year-old patient, who is a Yarsanism, who follows with Dr. Macdonald at [facility].  Apparently, the patient has been having symptoms of chest pressure for the last several months until lately when she had a heart catheterization at [facility] not too long ago.  Was told that she had significant disease in the left anterior descending, for which she underwent angioplasty and stenting.  Note that, as of right now, the reports are not available, and history was mainly obtained from the patient herself.  It looks like after the stent, she continued to have similar symptoms, for which she was taken back to the cath lab 2 weeks later with repeat cardiac catheterization, and supposedly, some type of balloon angioplasty was done at that time.  The patient went home, did a little bit better, but then started having the symptoms again described as shortness of breath and chest heaviness, both at rest and with exertion, probably mostly with exertion.  No specific triggers.  No specific radiation.  We are consulted to assist in the management of the patient.\"    Subjective (Events in last 24 hours): pt awake and alert.  NAD. No cp.  Has SANCHEZ, ongoing for past few months.   No edema or orthopnea.  If takes big breath, has a cough  On RA      Objective:   /61   Pulse 87   Temp 98.2 °F (36.8 °C) (Oral)   Resp 16   Ht 1.753 m (5' 9\")   Wt 92.9 kg (204 lb 12.9 oz)   LMP 01/21/2013   SpO2 96%   BMI 30.24 kg/m²        TELEMETRY: nsr    Physical Exam:  General Appearance: alert and oriented to person, place and time, in no acute distress  Cardiovascular: normal rate, regular rhythm, 
Patient and RN went over d/c instructions. Patient had no questions at this time. Advised to call 911 or go to nearest emergency room in the event of emergency. Patient IV out. Patient has all belongings.   
Pt is a 69y.o. female, sleeping soundly then woke briefly, in 8B-37; pt spouse is also in the room. Please refer to ACP note for context, re: AD's.     09/03/24 1043   Encounter Summary   Encounter Overview/Reason Advance Care Planning   Service Provided For Patient and family together   Referral/Consult From Multi-disciplinary team   Support System Family members   Last Encounter  09/03/24   Complexity of Encounter Low   Begin Time 1043   End Time  1045   Total Time Calculated 2 min   Advance Care Planning   Type   (Left full, blank set of AD's, contact card and brochure for pt review and study.)   Assessment/Intervention/Outcome   Assessment Unable to assess   Intervention Explored/Affirmed feelings, thoughts, concerns   Outcome Expressed Gratitude       
bolus, glucagon (rDNA), dextrose    Exam:  BP (!) 127/56   Pulse 73   Temp 98.1 °F (36.7 °C) (Oral)   Resp 16   Ht 1.753 m (5' 9\")   Wt 93.4 kg (206 lb)   LMP 01/21/2013   SpO2 98%   BMI 30.42 kg/m²   General: No distress, appears stated age.  Eyes:  PERRL. Conjunctivae/corneas clear.  HENT: Head normal appearing. Nares normal. Oral mucosa moist.  Hearing intact.   Neck: Supple, with full range of motion. Trachea midline.  No gross JVD appreciated.  Respiratory:  Normal effort. Clear to auscultation, without rales or wheezes or rhonchi.  Cardiovascular: Normal rate, regular rhythm with normal S1/S2 without murmurs.    No lower extremity edema.   Abdomen: Soft, non-tender, non-distended with normal bowel sounds.  Musculoskeletal: No joint swelling or tenderness. Normal tone. No abnormal movements.   Skin: Warm and dry. No rashes or lesions.  Neurologic:  No focal sensory/motor deficits in the upper or lower extremities. Cranial nerves:  grossly non-focal 2-12.     Psychiatric: Alert and oriented, normal insight and thought content.   Capillary Refill: Brisk,< 3 seconds.  Peripheral Pulses: +2 palpable, equal bilaterally.       Labs/Radiology: See chart or assessment above.     Electronically signed by WILMAR Camp on 9/2/2024 at 11:55 AM

## 2024-09-03 NOTE — DISCHARGE SUMMARY
Hospital Medicine Discharge Summary      Patient Identification:   Palmira Talamantes   : 1955  MRN: [id number]   Account: [id number]      Patient's PCP: Tyson Shields APRN - CNP    Admit Date: 2024     Discharge Date: 9/3/2024  3:34 PM    Admitting Physician: No admitting provider for patient encounter.     Discharge Physician: WILMAR Camp     Discharge Diagnoses:    Active Hospital Problems    Diagnosis Date Noted    Shortness of breath [R06.02] 2024       Discharge Assessment & Plan:  Shortness of Breath, with chest pressure: Cardiology consulted. Pt recent had a LHC and stent placed at [facility]. Pt states that she has not felt improvement with the stent. Per patient, she is severely SOB playing with her grandchildren and cannot walk in parking lots due to SOB.   Echo showed no acute issues. EF 55%.   Explained this could be a lung issue and recommended a referral to Pulm as an OP.     The patient was seen and examined on day of discharge and this discharge summary is in conjunction with any daily progress note from day of discharge.    Hospital Course:   Palmira Talamantes is a 69 y.o. female admitted to [facility] on 2024 for SOB.        Initial H&P \"Palmira Talamantes is a 69 y.o. female with PMHx of A-fib, depression, DMII, fibromyalgia, orthostatic hypotension, CAD who presents to [facility] with chest pain and shortness of breath.  Patient states that she was at [employer] service and became very short of breath and developed chest pain that is reproducible with palpation and coughing. She admits to being very anxious around that time due to recent cardiac stents and is worried because she never had chest pain prior to receiving stents.      Denies fevers, chills, nausea, vomiting and diarrhea.      ED course: labs drawn, imaging obtained\"     : Patient was admitted last evening. Patient denies chest pain at this time. Patient states that she is been having

## 2024-10-03 ENCOUNTER — OFFICE VISIT (OUTPATIENT)
Dept: PULMONOLOGY | Age: 69
End: 2024-10-03

## 2024-10-03 VITALS
TEMPERATURE: 97.4 F | BODY MASS INDEX: 30.33 KG/M2 | WEIGHT: 204.8 LBS | OXYGEN SATURATION: 99 % | DIASTOLIC BLOOD PRESSURE: 88 MMHG | SYSTOLIC BLOOD PRESSURE: 132 MMHG | HEIGHT: 69 IN | HEART RATE: 79 BPM

## 2024-10-03 DIAGNOSIS — D64.9 ANEMIA, UNSPECIFIED TYPE: ICD-10-CM

## 2024-10-03 DIAGNOSIS — F45.8 HYPERVENTILATION SYNDROME: ICD-10-CM

## 2024-10-03 DIAGNOSIS — E05.80 IATROGENIC HYPERTHYROIDISM: ICD-10-CM

## 2024-10-03 DIAGNOSIS — F41.9 ANXIETY: ICD-10-CM

## 2024-10-03 DIAGNOSIS — R06.02 SOB (SHORTNESS OF BREATH): Primary | ICD-10-CM

## 2024-10-03 NOTE — PROGRESS NOTES
medications    No follow-ups on file.       Electronically signed by Michelle Johnson MA on 10/3/2024 at 9:13 AM     **This report has been created using voice recognition software. It may contain minor errors which are inherent in voice recognition technology.**

## 2024-10-16 ENCOUNTER — HOSPITAL ENCOUNTER (OUTPATIENT)
Dept: PULMONOLOGY | Age: 69
Discharge: HOME | End: 2024-10-16
Attending: INTERNAL MEDICINE
Payer: MEDICARE

## 2024-10-16 DIAGNOSIS — R06.02 SOB (SHORTNESS OF BREATH): ICD-10-CM

## 2024-10-16 DIAGNOSIS — F41.9 ANXIETY: ICD-10-CM

## 2024-10-16 PROCEDURE — 94729 DIFFUSING CAPACITY: CPT

## 2024-10-16 PROCEDURE — 94726 PLETHYSMOGRAPHY LUNG VOLUMES: CPT

## 2024-10-16 PROCEDURE — 94060 EVALUATION OF WHEEZING: CPT

## 2024-10-29 ENCOUNTER — TELEPHONE (OUTPATIENT)
Dept: OTHER | Facility: CLINIC | Age: 69
End: 2024-10-29

## 2024-10-29 NOTE — TELEPHONE ENCOUNTER
Patient was outreached to as part of Population Health scheduling activity. Patient may be due for a wellness visit with their primary care provider.    NO OUTREACH, PT SEEING EXTERNAL MD

## 2024-11-15 NOTE — ANESTHESIA POSTPROCEDURE EVALUATION
Department of Anesthesiology  Postprocedure Note    Patient: Alma Harrell  MRN: [id number]  YOB: 1955  Date of evaluation: 9/7/2023      Procedure Summary     Date: 09/07/23 Room / Location: [address]    Anesthesia Start: 3154 Anesthesia Stop: 9544    Procedure: LEFT EYE CATARACT EMULSIFICATION IOL IMPLANT WITH CANALOPLASTY, GONIOTOMY LEFT EYE (Left) Diagnosis:       Other age-related incipient cataract of left eye      (Other age-related incipient cataract of left eye [H25.092])    Surgeons: Magdiel North MD Responsible Provider: Florin Jacobo MD    Anesthesia Type: MAC ASA Status: 3          Anesthesia Type: No value filed.     Levy Phase I:      Levy Phase II: Levy Score: 10      Anesthesia Post Evaluation    Patient location during evaluation: bedside  Patient participation: complete - patient participated  Level of consciousness: awake  Airway patency: patent  Nausea & Vomiting: no vomiting and no nausea  Complications: no  Cardiovascular status: hemodynamically stable  Respiratory status: acceptable  Hydration status: stable  Pain management: adequate LACERATION

## 2024-11-20 ENCOUNTER — OFFICE VISIT (OUTPATIENT)
Dept: PULMONOLOGY | Age: 69
End: 2024-11-20
Payer: MEDICARE

## 2024-11-20 VITALS
OXYGEN SATURATION: 99 % | DIASTOLIC BLOOD PRESSURE: 72 MMHG | WEIGHT: 205 LBS | SYSTOLIC BLOOD PRESSURE: 118 MMHG | TEMPERATURE: 97.2 F | HEART RATE: 76 BPM | HEIGHT: 68 IN | BODY MASS INDEX: 31.07 KG/M2

## 2024-11-20 DIAGNOSIS — F41.9 ANXIETY: Primary | ICD-10-CM

## 2024-11-20 DIAGNOSIS — R06.4 HYPERVENTILATION: ICD-10-CM

## 2024-11-20 DIAGNOSIS — G47.09 OTHER INSOMNIA: ICD-10-CM

## 2024-11-20 PROCEDURE — 3017F COLORECTAL CA SCREEN DOC REV: CPT | Performed by: INTERNAL MEDICINE

## 2024-11-20 PROCEDURE — 1123F ACP DISCUSS/DSCN MKR DOCD: CPT | Performed by: INTERNAL MEDICINE

## 2024-11-20 PROCEDURE — 1090F PRES/ABSN URINE INCON ASSESS: CPT | Performed by: INTERNAL MEDICINE

## 2024-11-20 PROCEDURE — 3074F SYST BP LT 130 MM HG: CPT | Performed by: INTERNAL MEDICINE

## 2024-11-20 PROCEDURE — 3078F DIAST BP <80 MM HG: CPT | Performed by: INTERNAL MEDICINE

## 2024-11-20 PROCEDURE — 1159F MED LIST DOCD IN RCRD: CPT | Performed by: INTERNAL MEDICINE

## 2024-11-20 PROCEDURE — 1036F TOBACCO NON-USER: CPT | Performed by: INTERNAL MEDICINE

## 2024-11-20 PROCEDURE — G8417 CALC BMI ABV UP PARAM F/U: HCPCS | Performed by: INTERNAL MEDICINE

## 2024-11-20 PROCEDURE — G8427 DOCREV CUR MEDS BY ELIG CLIN: HCPCS | Performed by: INTERNAL MEDICINE

## 2024-11-20 PROCEDURE — G8484 FLU IMMUNIZE NO ADMIN: HCPCS | Performed by: INTERNAL MEDICINE

## 2024-11-20 PROCEDURE — G8400 PT W/DXA NO RESULTS DOC: HCPCS | Performed by: INTERNAL MEDICINE

## 2024-11-20 PROCEDURE — 99213 OFFICE O/P EST LOW 20 MIN: CPT | Performed by: INTERNAL MEDICINE

## 2024-11-20 RX ORDER — NETARSUDIL AND LATANOPROST OPHTHALMIC SOLUTION, 0.02%/0.005% .2; .05 MG/ML; MG/ML
SOLUTION/ DROPS OPHTHALMIC; TOPICAL
COMMUNITY
Start: 2024-11-10

## 2024-11-20 RX ORDER — METOPROLOL SUCCINATE 25 MG/1
TABLET, EXTENDED RELEASE ORAL
COMMUNITY
Start: 2024-11-13

## 2024-11-20 RX ORDER — BLOOD SUGAR DIAGNOSTIC
1 STRIP MISCELLANEOUS 3 TIMES DAILY
COMMUNITY
Start: 2024-11-06 | End: 2025-11-06

## 2024-11-20 RX ORDER — HYDROXYZINE HYDROCHLORIDE 50 MG/1
50 TABLET, FILM COATED ORAL 2 TIMES DAILY PRN
COMMUNITY
Start: 2024-09-19

## 2024-11-20 NOTE — PROGRESS NOTES
Neck Circumference -   14  Mallampati - 3    Lung Nodule Screening     [] Qualifies    [x] Does not qualify   [] Declined    [] Completed  
regarding respiratory status or issues with prescribed medications    No follow-ups on file.       Electronically signed by Alfonso Baxter MD on 11/20/2024 at 9:01 AM     **This report has been created using voice recognition software. It may contain minor errors which are inherent in voice recognition technology.**

## 2024-12-02 ENCOUNTER — LAB (OUTPATIENT)
Dept: LAB | Age: 69
End: 2024-12-02

## 2024-12-02 LAB
ALBUMIN SERPL BCG-MCNC: 4.1 G/DL (ref 3.5–5.1)
ALP SERPL-CCNC: 111 U/L (ref 38–126)
ALT SERPL W/O P-5'-P-CCNC: 17 U/L (ref 11–66)
ANION GAP SERPL CALC-SCNC: 14 MEQ/L (ref 8–16)
AST SERPL-CCNC: 25 U/L (ref 5–40)
BILIRUB SERPL-MCNC: 0.3 MG/DL (ref 0.3–1.2)
BUN SERPL-MCNC: 19 MG/DL (ref 7–22)
CALCIUM SERPL-MCNC: 9.5 MG/DL (ref 8.5–10.5)
CHLORIDE SERPL-SCNC: 103 MEQ/L (ref 98–111)
CHOLEST SERPL-MCNC: 165 MG/DL (ref 100–199)
CO2 SERPL-SCNC: 23 MEQ/L (ref 23–33)
CREAT SERPL-MCNC: 1 MG/DL (ref 0.4–1.2)
DEPRECATED MEAN GLUCOSE BLD GHB EST-ACNC: 192 MG/DL (ref 70–126)
GFR SERPL CREATININE-BSD FRML MDRD: 61 ML/MIN/1.73M2
GLUCOSE SERPL-MCNC: 203 MG/DL (ref 70–108)
HBA1C MFR BLD HPLC: 8.4 % (ref 4.4–6.4)
HDLC SERPL-MCNC: 67 MG/DL
LDLC SERPL CALC-MCNC: 75 MG/DL
POTASSIUM SERPL-SCNC: 5 MEQ/L (ref 3.5–5.2)
PROT SERPL-MCNC: 7.6 G/DL (ref 6.1–8)
SODIUM SERPL-SCNC: 140 MEQ/L (ref 135–145)
T4 FREE SERPL-MCNC: 0.91 NG/DL (ref 0.93–1.68)
TRIGL SERPL-MCNC: 113 MG/DL (ref 0–199)
TSH SERPL DL<=0.005 MIU/L-ACNC: 1.8 UIU/ML (ref 0.4–4.2)

## (undated) DEVICE — Z INACTIVE USE 2735373 APPLICATOR FBR LAIN COT WOOD TIP ECONOMICAL

## (undated) DEVICE — INTREPID® TRANSFORMER IA HP: Brand: INTREPID®

## (undated) DEVICE — PHACOEMULSIFICATION PACK COMPACT

## (undated) DEVICE — DEVICE SYSTEM D SURG OMNI GEN 2

## (undated) DEVICE — MARKER,SKIN,WI/RULER AND LABELS: Brand: MEDLINE

## (undated) DEVICE — SOLUTION BSS 15ML

## (undated) DEVICE — GLOVE SURG 7.5 11.7IN BEAD CUF LIGHT BRN SENSICARE LTX FREE

## (undated) DEVICE — SYRINGE, LUER SLIP, STERILE, 1ML: Brand: MEDLINE

## (undated) DEVICE — MICROSURGICAL INSTRUMENT HYDRODISSECTION CANNULA 25GA, 8MM BEND: Brand: ALCON

## (undated) DEVICE — Device: Brand: MALYUGIN RING SYSTEM 6.25MM

## (undated) DEVICE — CLEARCUT® HP2 SLIT KNIFE INTREPID MICRO-COAXIAL SYSTEM 2.4 DB: Brand: CLEARCUT®; INTREPID

## (undated) DEVICE — EYE PAK* 1040 PLASTIC OPHTHALMIC DRAPE INCISE POUCH: Brand: ALCON EYE-PAK

## (undated) DEVICE — Device

## (undated) DEVICE — DRESSING TRNSPAR W2XL2.75IN FLM SHT SEMIPERMEABLE WIND

## (undated) DEVICE — CLEARCUT® SIDEPORT KNIFE DUAL BEVEL 1.0MM ANGLED: Brand: CLEARCUT®

## (undated) DEVICE — CATARACT PACK: Brand: MEDLINE INDUSTRIES, INC.

## (undated) DEVICE — BETADINE 5% EYE SOL

## (undated) DEVICE — MICROSURGICAL INSTRUMENT ANTERIOR CHAMBER CANNULA 27GA: Brand: ALCON